# Patient Record
Sex: FEMALE | Race: WHITE | Employment: UNEMPLOYED | URBAN - METROPOLITAN AREA
[De-identification: names, ages, dates, MRNs, and addresses within clinical notes are randomized per-mention and may not be internally consistent; named-entity substitution may affect disease eponyms.]

---

## 2022-06-08 ENCOUNTER — OFFICE VISIT (OUTPATIENT)
Dept: FAMILY MEDICINE CLINIC | Age: 32
End: 2022-06-08
Payer: MEDICAID

## 2022-06-08 VITALS
HEIGHT: 61 IN | HEART RATE: 103 BPM | TEMPERATURE: 97.6 F | WEIGHT: 171 LBS | DIASTOLIC BLOOD PRESSURE: 78 MMHG | BODY MASS INDEX: 32.28 KG/M2 | SYSTOLIC BLOOD PRESSURE: 110 MMHG | OXYGEN SATURATION: 98 %

## 2022-06-08 DIAGNOSIS — M79.7 FIBROMYALGIA: ICD-10-CM

## 2022-06-08 DIAGNOSIS — F32.A DEPRESSION, UNSPECIFIED DEPRESSION TYPE: ICD-10-CM

## 2022-06-08 DIAGNOSIS — F41.9 ANXIETY: Primary | ICD-10-CM

## 2022-06-08 PROCEDURE — 99204 OFFICE O/P NEW MOD 45 MIN: CPT | Performed by: NURSE PRACTITIONER

## 2022-06-08 RX ORDER — CLONIDINE HYDROCHLORIDE 0.1 MG/1
0.1 TABLET ORAL EVERY EVENING
COMMUNITY
End: 2022-07-08 | Stop reason: ALTCHOICE

## 2022-06-08 RX ORDER — LEVONORGESTREL 52 MG/1
1 INTRAUTERINE DEVICE INTRAUTERINE ONCE
COMMUNITY

## 2022-06-08 RX ORDER — CLONIDINE HYDROCHLORIDE 0.1 MG/1
0.1 TABLET ORAL EVERY EVENING
Qty: 60 TABLET | Status: CANCELLED | OUTPATIENT
Start: 2022-06-08

## 2022-06-08 RX ORDER — GABAPENTIN 100 MG/1
100 CAPSULE ORAL 2 TIMES DAILY
Qty: 60 CAPSULE | Refills: 0 | Status: SHIPPED | OUTPATIENT
Start: 2022-06-08 | End: 2022-07-06 | Stop reason: SDUPTHER

## 2022-06-08 RX ORDER — QUETIAPINE FUMARATE 100 MG/1
100 TABLET, FILM COATED ORAL NIGHTLY
COMMUNITY
End: 2022-06-08 | Stop reason: SDUPTHER

## 2022-06-08 RX ORDER — GABAPENTIN 100 MG/1
100 CAPSULE ORAL 2 TIMES DAILY
COMMUNITY
End: 2022-06-08 | Stop reason: SDUPTHER

## 2022-06-08 RX ORDER — DIAZEPAM 5 MG/1
5 TABLET ORAL 2 TIMES DAILY
COMMUNITY
End: 2022-07-08 | Stop reason: ALTCHOICE

## 2022-06-08 RX ORDER — PROPRANOLOL HYDROCHLORIDE 20 MG/1
TABLET ORAL
Qty: 90 TABLET | Refills: 3 | Status: SHIPPED | OUTPATIENT
Start: 2022-06-08 | End: 2022-07-06 | Stop reason: SDUPTHER

## 2022-06-08 RX ORDER — QUETIAPINE FUMARATE 100 MG/1
100 TABLET, FILM COATED ORAL NIGHTLY
Qty: 30 TABLET | Refills: 0 | Status: SHIPPED | OUTPATIENT
Start: 2022-06-08 | End: 2022-07-06 | Stop reason: SDUPTHER

## 2022-06-08 RX ORDER — DIAZEPAM 5 MG/1
5 TABLET ORAL
Status: CANCELLED | OUTPATIENT
Start: 2022-06-08

## 2022-06-08 SDOH — ECONOMIC STABILITY: FOOD INSECURITY: WITHIN THE PAST 12 MONTHS, THE FOOD YOU BOUGHT JUST DIDN'T LAST AND YOU DIDN'T HAVE MONEY TO GET MORE.: NEVER TRUE

## 2022-06-08 SDOH — ECONOMIC STABILITY: FOOD INSECURITY: WITHIN THE PAST 12 MONTHS, YOU WORRIED THAT YOUR FOOD WOULD RUN OUT BEFORE YOU GOT MONEY TO BUY MORE.: NEVER TRUE

## 2022-06-08 ASSESSMENT — PATIENT HEALTH QUESTIONNAIRE - PHQ9
SUM OF ALL RESPONSES TO PHQ QUESTIONS 1-9: 0
SUM OF ALL RESPONSES TO PHQ9 QUESTIONS 1 & 2: 0
2. FEELING DOWN, DEPRESSED OR HOPELESS: 0
1. LITTLE INTEREST OR PLEASURE IN DOING THINGS: 0

## 2022-06-08 ASSESSMENT — SOCIAL DETERMINANTS OF HEALTH (SDOH): HOW HARD IS IT FOR YOU TO PAY FOR THE VERY BASICS LIKE FOOD, HOUSING, MEDICAL CARE, AND HEATING?: NOT HARD AT ALL
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
## 2022-06-17 ASSESSMENT — ENCOUNTER SYMPTOMS
COUGH: 0
ABDOMINAL PAIN: 0
VOMITING: 0
BACK PAIN: 1
NAUSEA: 0
RHINORRHEA: 0
DIARRHEA: 0
SORE THROAT: 0
WHEEZING: 0
SHORTNESS OF BREATH: 0

## 2022-07-05 DIAGNOSIS — F32.A DEPRESSION, UNSPECIFIED DEPRESSION TYPE: ICD-10-CM

## 2022-07-05 DIAGNOSIS — F41.9 ANXIETY: ICD-10-CM

## 2022-07-05 DIAGNOSIS — M79.7 FIBROMYALGIA: ICD-10-CM

## 2022-07-05 NOTE — TELEPHONE ENCOUNTER
Comments:     Last Office Visit (last PCP visit):   6/8/2022    Next Visit Date:  No future appointments. **If hasn't been seen in over a year OR hasn't followed up according to last diabetes/ADHD visit, make appointment for patient before sending refill to provider. Rx requested:  Requested Prescriptions     Pending Prescriptions Disp Refills    gabapentin (NEURONTIN) 100 MG capsule 60 capsule 0     Sig: Take 1 capsule by mouth in the morning and at bedtime for 30 days. Once in the morning once a night    QUEtiapine (SEROQUEL) 100 MG tablet 30 tablet 0     Sig: Take 1 tablet by mouth nightly    propranolol (INDERAL) 20 MG tablet 90 tablet 3     Sig: Take one tablet by mouth three times daily as needed for anxiety.

## 2022-07-06 RX ORDER — PROPRANOLOL HYDROCHLORIDE 20 MG/1
TABLET ORAL
Qty: 90 TABLET | Refills: 3 | Status: SHIPPED | OUTPATIENT
Start: 2022-07-06

## 2022-07-06 RX ORDER — QUETIAPINE FUMARATE 100 MG/1
100 TABLET, FILM COATED ORAL NIGHTLY
Qty: 30 TABLET | Refills: 0 | Status: SHIPPED | OUTPATIENT
Start: 2022-07-06 | End: 2022-08-05

## 2022-07-06 RX ORDER — GABAPENTIN 100 MG/1
100 CAPSULE ORAL 2 TIMES DAILY
Qty: 60 CAPSULE | Refills: 0 | Status: SHIPPED | OUTPATIENT
Start: 2022-07-06 | End: 2022-07-08 | Stop reason: DRUGHIGH

## 2022-07-08 ENCOUNTER — OFFICE VISIT (OUTPATIENT)
Dept: FAMILY MEDICINE CLINIC | Age: 32
End: 2022-07-08
Payer: MEDICAID

## 2022-07-08 VITALS
HEART RATE: 90 BPM | TEMPERATURE: 97.5 F | BODY MASS INDEX: 32.12 KG/M2 | SYSTOLIC BLOOD PRESSURE: 118 MMHG | OXYGEN SATURATION: 97 % | DIASTOLIC BLOOD PRESSURE: 80 MMHG | WEIGHT: 170 LBS

## 2022-07-08 DIAGNOSIS — G89.29 CHRONIC PAIN OF RIGHT KNEE: ICD-10-CM

## 2022-07-08 DIAGNOSIS — G89.29 CHRONIC BILATERAL LOW BACK PAIN WITH LEFT-SIDED SCIATICA: Primary | ICD-10-CM

## 2022-07-08 DIAGNOSIS — M79.7 FIBROMYALGIA: ICD-10-CM

## 2022-07-08 DIAGNOSIS — M54.42 CHRONIC BILATERAL LOW BACK PAIN WITH LEFT-SIDED SCIATICA: Primary | ICD-10-CM

## 2022-07-08 DIAGNOSIS — F41.9 ANXIETY: ICD-10-CM

## 2022-07-08 DIAGNOSIS — M25.561 CHRONIC PAIN OF RIGHT KNEE: ICD-10-CM

## 2022-07-08 PROCEDURE — 99214 OFFICE O/P EST MOD 30 MIN: CPT | Performed by: NURSE PRACTITIONER

## 2022-07-08 RX ORDER — TRAMADOL HYDROCHLORIDE 50 MG/1
50 TABLET ORAL 2 TIMES DAILY PRN
Qty: 14 TABLET | Refills: 0 | Status: SHIPPED | OUTPATIENT
Start: 2022-07-08 | End: 2022-07-22 | Stop reason: SDUPTHER

## 2022-07-08 RX ORDER — GABAPENTIN 100 MG/1
200 CAPSULE ORAL 2 TIMES DAILY
Qty: 120 CAPSULE | Refills: 0 | Status: SHIPPED | OUTPATIENT
Start: 2022-07-08 | End: 2022-08-05
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
## 2022-07-11 ASSESSMENT — ENCOUNTER SYMPTOMS
NAUSEA: 0
DIARRHEA: 0
WHEEZING: 0
VOMITING: 0
BACK PAIN: 1
COUGH: 0
SHORTNESS OF BREATH: 0
RHINORRHEA: 0
ABDOMINAL PAIN: 0
SORE THROAT: 0

## 2022-07-20 DIAGNOSIS — M25.561 CHRONIC PAIN OF RIGHT KNEE: ICD-10-CM

## 2022-07-20 DIAGNOSIS — G89.29 CHRONIC PAIN OF RIGHT KNEE: ICD-10-CM

## 2022-07-20 DIAGNOSIS — G89.29 CHRONIC BILATERAL LOW BACK PAIN WITH LEFT-SIDED SCIATICA: ICD-10-CM

## 2022-07-20 DIAGNOSIS — M79.7 FIBROMYALGIA: ICD-10-CM

## 2022-07-20 DIAGNOSIS — M54.42 CHRONIC BILATERAL LOW BACK PAIN WITH LEFT-SIDED SCIATICA: ICD-10-CM

## 2022-07-20 RX ORDER — TRAMADOL HYDROCHLORIDE 50 MG/1
50 TABLET ORAL 2 TIMES DAILY PRN
Qty: 14 TABLET | Refills: 0 | Status: CANCELLED | OUTPATIENT
Start: 2022-07-20 | End: 2022-07-27

## 2022-07-21 NOTE — TELEPHONE ENCOUNTER
Pt calling to check on refill for tramadol. Also wants to know if she can get a referral to a \"knee specialist\"  (orthopedic doctor?) if possible.

## 2022-07-22 DIAGNOSIS — M79.7 FIBROMYALGIA: ICD-10-CM

## 2022-07-22 DIAGNOSIS — M54.42 CHRONIC BILATERAL LOW BACK PAIN WITH LEFT-SIDED SCIATICA: ICD-10-CM

## 2022-07-22 DIAGNOSIS — G89.29 CHRONIC PAIN OF RIGHT KNEE: ICD-10-CM

## 2022-07-22 DIAGNOSIS — M25.561 CHRONIC PAIN OF RIGHT KNEE: ICD-10-CM

## 2022-07-22 DIAGNOSIS — G89.29 CHRONIC BILATERAL LOW BACK PAIN WITH LEFT-SIDED SCIATICA: ICD-10-CM

## 2022-07-22 RX ORDER — TRAMADOL HYDROCHLORIDE 50 MG/1
50 TABLET ORAL 2 TIMES DAILY PRN
Qty: 14 TABLET | Refills: 0 | Status: SHIPPED | OUTPATIENT
Start: 2022-07-22 | End: 2022-07-29

## 2022-08-03 ENCOUNTER — INITIAL CONSULT (OUTPATIENT)
Dept: PAIN MANAGEMENT | Age: 32
End: 2022-08-03
Payer: MEDICAID

## 2022-08-03 VITALS
BODY MASS INDEX: 32.1 KG/M2 | WEIGHT: 170 LBS | OXYGEN SATURATION: 95 % | TEMPERATURE: 98.6 F | HEART RATE: 70 BPM | HEIGHT: 61 IN

## 2022-08-03 DIAGNOSIS — M47.817 LUMBOSACRAL SPONDYLOSIS WITHOUT MYELOPATHY: ICD-10-CM

## 2022-08-03 DIAGNOSIS — M46.1 BILATERAL SACROILIITIS (HCC): ICD-10-CM

## 2022-08-03 DIAGNOSIS — G89.29 CHRONIC BILATERAL LOW BACK PAIN WITH LEFT-SIDED SCIATICA: Primary | ICD-10-CM

## 2022-08-03 DIAGNOSIS — M54.42 CHRONIC BILATERAL LOW BACK PAIN WITH LEFT-SIDED SCIATICA: Primary | ICD-10-CM

## 2022-08-03 PROCEDURE — 99204 OFFICE O/P NEW MOD 45 MIN: CPT | Performed by: PAIN MEDICINE

## 2022-08-03 RX ORDER — TRAMADOL HYDROCHLORIDE 50 MG/1
50 TABLET ORAL EVERY 6 HOURS PRN
COMMUNITY
End: 2022-09-01

## 2022-08-03 ASSESSMENT — ENCOUNTER SYMPTOMS
NAUSEA: 0
BACK PAIN: 1
SHORTNESS OF BREATH: 0
CONSTIPATION: 0
DIARRHEA: 0

## 2022-08-03 NOTE — PROGRESS NOTES
[facility]) Physicians  Neurosurgery and Pain [facility]  [address], [address] [facility] , [address] [facility] [place]: [phone]  F: [phone]        Shelbie Segal  (1990)    8/3/2022    Subjective:     Shelbie Segal is 28 y.o. female who complains today of:    Chief Complaint   Patient presents with    Back Pain    Neck Pain    Shoulder Pain    Knee Pain       HPI    Patient complains of pain that is chronic in multiple areas. She has a h/o of fibromyalgia, PTSD and anxiety. She reports that she had a traumatic childhood, but did not go into detail. Painful areas include: lower back with scitica down the left side predominantly (had this for years). She also has pain in shoulders and hips. Pain is located in the lower back/buttocks. It has been present for 15 years. Pain is described as a dull, constant ache. In the left leg, it is more sharp and shooting. Pain level today is rated 5 on a 10 point scale. It is severe in nature. With pain medication (Tramadol in past helped, Gabapentin), pain level drops to a 1/10. Without pain medication, pain level can escalate upto a 7/10. Pain is affecting the patients ability to perform activities of daily living especially with regards to personal hygiene, household chores and self care. With pain medication, the patient is better able to perform ADLs. Pain in part is secondary to osteoarthritis of the spine. Pain is aggravated by bending, lifting, twisting, walking, and standing  Pain is alleviated by rest and medication. Prior PT:  Yes, for three months focusing on th lower back while she was in [place]. She is currently also seeing a chiropractor. Prior Injections: none  Prior medications: NSAIDs, muscle relaxants, opiods, analgesics, and membrane stabilizers  Prior spine surgeries:  none  Pain is not is associated with fevers/chills/night sweats. Bowel and bladder are working appropriately.     She also has right knee pain. Chronic. Worse weight bearing activity, better with rest. Sharp/aching. Is going to see ortho for it. Allergies:  Patient has no known allergies. Past Medical History:   Diagnosis Date    ADHD     Anxiety     Arthritis     Depression     Fibromyalgia     Gastritis     Hx of degenerative disc disease     PTSD (post-traumatic stress disorder)      Past Surgical History:   Procedure Laterality Date    CHOLECYSTECTOMY      COLONOSCOPY  2021    TONSILLECTOMY      WISDOM TOOTH EXTRACTION       Family History   Problem Relation Age of Onset    Diabetes Mother     Stroke Mother     High Blood Pressure Father      Social History     Socioeconomic History    Marital status: Unknown     Spouse name: Not on file    Number of children: Not on file    Years of education: Not on file    Highest education level: Not on file   Occupational History    Not on file   Tobacco Use    Smoking status: Never    Smokeless tobacco: Never   Vaping Use    Vaping Use: Every day    Substances: Nicotine   Substance and Sexual Activity    Alcohol use: Yes     Comment: socially 1-2 times per week    Drug use: Never    Sexual activity: Not on file   Other Topics Concern    Not on file   Social History Narrative    Not on file     Social Determinants of Health     Financial Resource Strain: Low Risk     Difficulty of Paying Living Expenses: Not hard at all   Food Insecurity: No Food Insecurity    Worried About Running Out of Food in the Last Year: Never true    Ran Out of Food in the Last Year: Never true   Transportation Needs: Not on file   Physical Activity: Not on file   Stress: Not on file   Social Connections: Not on file   Intimate Partner Violence: Not on file   Housing Stability: Not on file       Current Outpatient Medications on File Prior to Visit   Medication Sig Dispense Refill    gabapentin (NEURONTIN) 100 MG capsule Take 2 capsules by mouth 2 times daily for 30 days.  Intended supply: 90 days 120 capsule 0 QUEtiapine (SEROQUEL) 100 MG tablet Take 1 tablet by mouth nightly 30 tablet 0    propranolol (INDERAL) 20 MG tablet Take one tablet by mouth three times daily as needed for anxiety. 90 tablet 3    levonorgestrel (LILETTA, 52 MG,) 20.1 MCG/DAY IUD IUD 52 mg 1 each by IntraUTERine route once       No current facility-administered medications on file prior to visit. Review of Systems   Constitutional:  Negative for fever. HENT:  Negative for hearing loss. Respiratory:  Negative for shortness of breath. Gastrointestinal:  Negative for constipation, diarrhea and nausea. Genitourinary:  Negative for difficulty urinating. Musculoskeletal:  Positive for arthralgias and back pain. Skin:  Negative for rash. Neurological:  Negative for headaches. Hematological:  Does not bruise/bleed easily. Psychiatric/Behavioral:  Negative for sleep disturbance. Objective:     Vitals:  Pulse 70   Temp 98.6 °F (37 °C)   Ht 5' 1\" (1.549 m)   Wt 170 lb (77.1 kg)   SpO2 95%   BMI 32.12 kg/m² Pain Score:   5      Physical Exam    General Appearance: NAD and Conversant. Eyes: EOM intact. HENT: Atraumatic. Neck: Neck is supple and Trachea midline. Lymph: No supraclavicular lymphadenopathy. Lungs: Normal respiratory effort and No significant respiratory distress. Cardiovascular: No lower extremity ulcerations or cyanosis and Capillary refill is less than 2 seconds. Abdomen: Soft and Non tender to palpation. Extremeties: No significant lower exremity edema. Skin: Visualized skin is intact and she has normal skin turgor. Psych: Mood and affect within normal limits, Insight and judgement within normal limits, and Alert and oriented. Inspection of the spine reveals no gross abnormalities in terms of curvature. ROM of the spine is abnormal.  There is pain inhibition with end ranges    Facet loading reproduces her symptoms. Palpation: she hasTTP over the lumbar paraspinal musculature.   Sacroiliac injection as ordered above. Pertinent imaging reviewed. She has failed conservative treatment (PT, Chiropractor, NSAIDs). Discussed the risks including but not limited to bleeding, infection, worsened pain, damage to surrounding structures, side effects, toxicity, allergic reactions to medications used, need for surgery, premature damage or degeneration of the joint, as well as catastrophic injury such as vision loss, paralysis, stroke, bowel or bladder incontinence, ventilator dependence, loss of use of the joint and/or extremity, and death. Discussed the risks, benefits, and alternatives to the procedure including no procedure at all. Discussed that we cannot undo any permanent neurologic or orthopaedic damage or change the course of any underlying disease. After thorough discussion, patient expressed understanding and willingness to proceed. Tramadol PRN per PCP. She asked some questions about a spinal cord stimulator, but she has not exhausted conservative measures or workup, and is not a candidate. 5. If she gets no relief, we will consider MRI of lumbar spine. Follow up:  Return for Follow Up 1 month, X rays, Joint Injection 1-2 weeks. The patient will follow up for reevaluation of her pain. The patient is aware of the treatment plan and in agreement. All of her questions were answered.      Gracia Richards MD

## 2022-08-04 ENCOUNTER — TELEPHONE (OUTPATIENT)
Dept: PAIN MANAGEMENT | Age: 32
End: 2022-08-04

## 2022-08-04 DIAGNOSIS — M79.7 FIBROMYALGIA: ICD-10-CM

## 2022-08-04 DIAGNOSIS — F32.A DEPRESSION, UNSPECIFIED DEPRESSION TYPE: ICD-10-CM

## 2022-08-04 DIAGNOSIS — M54.42 CHRONIC BILATERAL LOW BACK PAIN WITH LEFT-SIDED SCIATICA: ICD-10-CM

## 2022-08-04 DIAGNOSIS — F41.9 ANXIETY: ICD-10-CM

## 2022-08-04 DIAGNOSIS — G89.29 CHRONIC BILATERAL LOW BACK PAIN WITH LEFT-SIDED SCIATICA: ICD-10-CM

## 2022-08-04 NOTE — TELEPHONE ENCOUNTER
ORDER PLACED:    Date: 8/3/22  Description: BILAT SI JOINT INJECTION  Order Number: [id number]  Ordering Provider: [facility]  Performing Provider: [facility]  CPT Codes: 11589  ICD10 Codes: M46.1    PER [employer] PORTAL PATIENT IS TRADITIONAL MEDICAID,NO AUTH IS REQUIRED

## 2022-08-05 RX ORDER — QUETIAPINE FUMARATE 100 MG/1
100 TABLET, FILM COATED ORAL NIGHTLY
Qty: 90 TABLET | Refills: 0 | Status: SHIPPED | OUTPATIENT
Start: 2022-08-05 | End: 2022-11-03

## 2022-08-05 RX ORDER — GABAPENTIN 100 MG/1
200 CAPSULE ORAL 2 TIMES DAILY
Qty: 120 CAPSULE | Refills: 2 | Status: SHIPPED | OUTPATIENT
Start: 2022-08-05 | End: 2022-11-03

## 2022-08-05 NOTE — TELEPHONE ENCOUNTER
Comments:     Last Office Visit (last PCP visit):   7/8/2022    Next Visit Date:  Future Appointments   Date Time Provider Tay Starr   8/11/2022  2:15 PM Diana Cee MD [id number] [address]   9/6/2022  9:45 AM CHRISTIANE Elias - CNP [facility]       **If hasn't been seen in over a year OR hasn't followed up according to last diabetes/ADHD visit, make appointment for patient before sending refill to provider. Rx requested:  Requested Prescriptions     Pending Prescriptions Disp Refills    QUEtiapine (SEROQUEL) 100 MG tablet [Pharmacy Med Name: quetiapine 100 mg tablet] 30 tablet 0     Sig: Take 1 tablet by mouth nightly    gabapentin (NEURONTIN) 100 MG capsule [Pharmacy Med Name: gabapentin 100 mg capsule] 120 capsule 0     Sig: Take 2 capsules by mouth 2 times daily for 30 days.

## 2022-08-11 ENCOUNTER — OFFICE VISIT (OUTPATIENT)
Dept: ORTHOPEDIC SURGERY | Age: 32
End: 2022-08-11
Payer: MEDICAID

## 2022-08-11 VITALS
OXYGEN SATURATION: 78 % | HEIGHT: 61 IN | HEART RATE: 98 BPM | TEMPERATURE: 97 F | BODY MASS INDEX: 31.15 KG/M2 | WEIGHT: 165 LBS

## 2022-08-11 DIAGNOSIS — M17.11 PRIMARY OSTEOARTHRITIS OF RIGHT KNEE: Primary | ICD-10-CM

## 2022-08-11 DIAGNOSIS — M22.8X1 MALTRACKING OF RIGHT PATELLA: ICD-10-CM

## 2022-08-11 PROCEDURE — 99244 OFF/OP CNSLTJ NEW/EST MOD 40: CPT | Performed by: ORTHOPAEDIC SURGERY

## 2022-08-11 PROCEDURE — 20610 DRAIN/INJ JOINT/BURSA W/O US: CPT | Performed by: ORTHOPAEDIC SURGERY

## 2022-08-11 RX ORDER — TRIAMCINOLONE ACETONIDE 40 MG/ML
80 INJECTION, SUSPENSION INTRA-ARTICULAR; INTRAMUSCULAR ONCE
Status: COMPLETED | OUTPATIENT
Start: 2022-08-11 | End: 2022-08-11

## 2022-08-11 RX ORDER — LIDOCAINE HYDROCHLORIDE 10 MG/ML
8 INJECTION, SOLUTION INFILTRATION; PERINEURAL ONCE
Status: COMPLETED | OUTPATIENT
Start: 2022-08-11 | End: 2022-08-11

## 2022-08-11 RX ADMIN — TRIAMCINOLONE ACETONIDE 80 MG: 40 INJECTION, SUSPENSION INTRA-ARTICULAR; INTRAMUSCULAR at 15:13

## 2022-08-11 RX ADMIN — LIDOCAINE HYDROCHLORIDE 8 ML: 10 INJECTION, SOLUTION INFILTRATION; PERINEURAL at 15:12

## 2022-08-11 ASSESSMENT — ENCOUNTER SYMPTOMS
SHORTNESS OF BREATH: 0
COUGH: 0
ABDOMINAL PAIN: 0
EYE PAIN: 0
CONSTIPATION: 0
EYE DISCHARGE: 0
EYE ITCHING: 0
DIARRHEA: 0

## 2022-08-11 NOTE — PROGRESS NOTES
Patient's name, date of birth, and allergies have been confirmed. Patient is aware that injection is to be given in Right knee. He/she is aware that they will be seeing Dr. Nimesh Tavera and the injection will be given by him.

## 2022-08-11 NOTE — PROGRESS NOTES
This is a consult from CHRISTIANE Joyner CNP for evaluation of the patient's right knee pain. Patient ID:  Thelma Esparza is a 28 y.o. female who presents today for evaluation of right knee pain. Injury: no -the patient recently relocated from [place] and did a lot of moving. She has been having pain in the knee ever since  Metal Allergy: no    Location: right  knee pain, located on the medial and anterior aspect of the knee  Pain: yes; 7 on a scale of 1 to 10  Onset: sudden  Duration: 2 months  Frequency:  occurs daily  Quality: aching and boring   Swelling: patient notes intermittent swelling of the joint  Aggravating factors: weight bearing activity, standing, and walking  Alleviating factors: removing weight from leg and rest  Mechanical symptoms: none  Radiation: no    Activities: walking independently  Restriction:  decreased ambulatory tolerance  Progression:  worsening    Previous treatment:  none  NSAIDs:  none  PT:  none    Medications:    Current Outpatient Medications on File Prior to Visit   Medication Sig Dispense Refill    QUEtiapine (SEROQUEL) 100 MG tablet Take 1 tablet by mouth nightly 90 tablet 0    gabapentin (NEURONTIN) 100 MG capsule Take 2 capsules by mouth in the morning and 2 capsules before bedtime. Do all this for 90 days. 120 capsule 2    propranolol (INDERAL) 20 MG tablet Take one tablet by mouth three times daily as needed for anxiety. 90 tablet 3    levonorgestrel (LILETTA, 52 MG,) 20.1 MCG/DAY IUD IUD 52 mg 1 each by IntraUTERine route once      traMADol (ULTRAM) 50 MG tablet Take 50 mg by mouth every 6 hours as needed for Pain. (Patient not taking: Reported on 8/11/2022)       No current facility-administered medications on file prior to visit.        Allergies:    No Known Allergies    Past Medical History:    Past Medical History:   Diagnosis Date    ADHD     Anxiety     Arthritis     Arthritis     Chronic back pain     Chronic headaches     Depression     Fibromyalgia Gastritis     Hx of degenerative disc disease     Irritable bowel syndrome     PTSD (post-traumatic stress disorder)        Past Surgical History:    Past Surgical History:   Procedure Laterality Date    CHOLECYSTECTOMY      COLONOSCOPY  2021    TONSILLECTOMY      WISDOM TOOTH EXTRACTION         Social History:    Social History     Socioeconomic History    Marital status: Unknown     Spouse name: Not on file    Number of children: Not on file    Years of education: Not on file    Highest education level: Not on file   Occupational History    Not on file   Tobacco Use    Smoking status: Never    Smokeless tobacco: Never   Vaping Use    Vaping Use: Every day    Substances: Nicotine   Substance and Sexual Activity    Alcohol use: Yes     Comment: socially 1-2 times per week    Drug use: Never    Sexual activity: Not on file   Other Topics Concern    Not on file   Social History Narrative    Not on file     Social Determinants of Health     Financial Resource Strain: Low Risk     Difficulty of Paying Living Expenses: Not hard at all   Food Insecurity: No Food Insecurity    Worried About Running Out of Food in the Last Year: Never true    Ran Out of Food in the Last Year: Never true   Transportation Needs: Not on file   Physical Activity: Not on file   Stress: Not on file   Social Connections: Not on file   Intimate Partner Violence: Not on file   Housing Stability: Not on file       Family History:    Family History   Problem Relation Age of Onset    High Blood Pressure Mother     Arthritis Mother     Diabetes Mother     Stroke Mother     Arthritis Father     High Blood Pressure Father        Occupation:   Presently not working   - Occupational requirements:  none    Workers Compensation:  Have you missed work for this issue?  no  Is this issue being addressed under a worker's comp claim? no    Review of Systems:    Review of Systems   Constitutional:  Negative for activity change, appetite change and chills. HENT:  Negative for congestion, ear pain and hearing loss. Eyes:  Negative for pain, discharge and itching. Respiratory:  Negative for cough and shortness of breath. Cardiovascular:  Negative for chest pain and leg swelling. Gastrointestinal:  Negative for abdominal pain, constipation and diarrhea. Endocrine: Negative for cold intolerance, heat intolerance and polydipsia. Genitourinary:  Negative for difficulty urinating, flank pain and frequency. Skin:  Negative for rash and wound. Allergic/Immunologic: Negative for environmental allergies and food allergies. Neurological:  Negative for dizziness, seizures and syncope. Physical Exam:    Examination of the right knee    Gait:  antalgic gait affecting right  Inspection:  neutral  Swelling:  none  Erythema:  none  Ecchymosis:  none  Effusion:  1+  Palpation:  distal medial femoral condyle and lateral patella  Extension:  5  Flexion:  110  Strength:  5  Varus/Valgus Instability:  none  Anterior/Posterior Instability:  none  Steven:  negative  Thessaly:  negative  Modified Apley:  negative  Lachman:  negative  Patellar compression:  positive  Neurological/Vascular:  Sensation grossly intact. Dorsalis pedis palpable and 2+    Radiographs:  Radiographs of the right knee were personally reviewed, bilateral standing AP, bilateral notch, bilateral sunrise and lateral right knee and they revealed:  no evidence of fracture, mild medial knee oa, and the patient has lateral patellar tilt and is maintaining a lateral joint space. This is all on the right knee. There are no destructive bony lesions noted. There are no intra-articular loose bodies noted. The patient's maintaining a joint space in all 3 compartments of the left knee. She does, however, have lateral patellar tilt on the left side. No destructive bony lesions noted on the left. There are no intra-articular loose bodies noted. MRI: None    Diagnosis:   Diagnosis Orders   1. Primary osteoarthritis of right knee  NV ARTHROCENTESIS ASPIR&/INJ MAJOR JT/BURSA W/O US      2. Maltracking of right patella  NV ARTHROCENTESIS ASPIR&/INJ MAJOR JT/BURSA W/O US    Ambulatory referral to Physical Therapy          Plan:    The patient has some very mild degenerative changes of the right knee. That being said, it became symptomatic while she was moving. We discussed arthritis. We discussed its progressive nature. Treatment options were discussed. Patient declined taking any medications. For this reason, we discussed an intra-articular steroid injection. The patient opted to proceed with this. She will follow-up as needed. Time Out  [x] Patient Verified  [x] Site Verified  [x] Laterality Verified  [x] Procedure Verified    Before aspiration/injection risks/benefits of a cortisone injection including infection, local skin irritation, skin atrophy, continued pain/discomfort, elevated blood sugar, burning, failure to relieve pain, possible late infection were discussed with patient. Patient verbalized understanding and wanted to proceed with planned procedure. After prepping and draping the right knee in the usual sterile fashion, 2 cc of 40 mg/ml kenalog with 8 cc of 1% lidocaine were injected intra-articularly without any complications. Patient tolerated this well. Post-procedure discomfort can be alleviated with additional medications/ice/elevation/rest over the first 24 hours as recommended. The patient tolerated the procedure well.     If fluid was aspirated it was sent for further studies  in sterile specimen container as ordered to the lab     Orders Placed This Encounter   Procedures    Ambulatory referral to Physical Therapy     Referral Priority:   Routine     Referral Type:   Eval and Treat     Referral Reason:   Specialty Services Required     Requested Specialty:   Physical Therapist     Number of Visits Requested:   1    NV ARTHROCENTESIS ASPIR&/INJ MAJOR JT/BURSA W/O US       Orders Placed This Encounter   Medications    lidocaine 1 % injection 8 mL    triamcinolone acetonide (KENALOG-40) injection 80 mg       Return if symptoms worsen or fail to improve.     Louie Wasserman MD

## 2022-08-31 DIAGNOSIS — M16.12 PRIMARY OSTEOARTHRITIS OF LEFT HIP: ICD-10-CM

## 2022-08-31 DIAGNOSIS — M16.11 PRIMARY OSTEOARTHRITIS OF RIGHT HIP: Primary | ICD-10-CM

## 2022-09-01 ENCOUNTER — OFFICE VISIT (OUTPATIENT)
Dept: ORTHOPEDIC SURGERY | Age: 32
End: 2022-09-01
Payer: MEDICAID

## 2022-09-01 VITALS
HEIGHT: 61 IN | OXYGEN SATURATION: 98 % | BODY MASS INDEX: 31.15 KG/M2 | TEMPERATURE: 98.1 F | HEART RATE: 91 BPM | WEIGHT: 165 LBS

## 2022-09-01 DIAGNOSIS — S83.241D TEAR OF MEDIAL MENISCUS OF RIGHT KNEE, CURRENT, UNSPECIFIED TEAR TYPE, SUBSEQUENT ENCOUNTER: ICD-10-CM

## 2022-09-01 DIAGNOSIS — G89.29 CHRONIC SI JOINT PAIN: ICD-10-CM

## 2022-09-01 DIAGNOSIS — M53.3 CHRONIC SI JOINT PAIN: ICD-10-CM

## 2022-09-01 DIAGNOSIS — M54.16 LUMBAR RADICULOPATHY: Primary | ICD-10-CM

## 2022-09-01 PROCEDURE — 99214 OFFICE O/P EST MOD 30 MIN: CPT | Performed by: ORTHOPAEDIC SURGERY

## 2022-09-01 RX ORDER — METHYLPREDNISOLONE 4 MG/1
TABLET ORAL
Qty: 21 TABLET | Refills: 0 | Status: SHIPPED | OUTPATIENT
Start: 2022-09-01 | End: 2022-09-28

## 2022-09-01 NOTE — PROGRESS NOTES
Patient ID:  Franco Friend is a 28 y.o. female who presents today for evaluation of bilateral hip pain, worse on left. Lower back, buttock, occasionally down to left ankle posteriorly. Patient reports that she is done physical therapy and failed to get any improvement. The patient reports that she also continues to have right knee pain. It is not activity related aching and throbbing, but it is sharp stabbing pains with instability. The pain is mostly on the medial side of the knee. Suspicion is for a tear of the medial meniscus.     Injury: no    Knee  Location of pain:  right  medial knee  Pain: yes; 7 on a scale of 1 to 10  Onset: sudden  Duration: 3 months  Frequency:  occurs intermittently and occurs daily  Quality: sharp   Swelling: patient notes intermittent swelling of the joint  Aggravating factors: weight bearing activity, walking, twisting, and deep knee flexion  Alleviating factors: removing weight from leg and rest  Mechanical symptoms: instability and locking  Radiation: no    Hip  Location of pain: Lower back and buttock bilaterally; occasionally radiating down the left posterior thigh to the ankle  Pain: yes; 7 on a scale of 1 to 10  Onset: gradual  Duration: 2 months  Frequency:  occurs daily  Quality: aching and burning   Swelling: None  Aggravating factors: weight bearing activity, standing, and walking  Alleviating factors: removing weight from leg and rest  Mechanical symptoms: none  Radiation: yes down the posterior thigh on the left    Activities: walking independently  Restriction:  decreased ambulatory tolerance  Progression:  worsening    Previous treatment:   Steroid injection for the knee, and physical therapy for the back  NSAIDs:  ibuprofen/motrin  PT:   PT from December to March for back    Medications:    Current Outpatient Medications on File Prior to Visit   Medication Sig Dispense Refill    QUEtiapine (SEROQUEL) 100 MG tablet Take 1 tablet by mouth nightly 90 tablet 0 gabapentin (NEURONTIN) 100 MG capsule Take 2 capsules by mouth in the morning and 2 capsules before bedtime. Do all this for 90 days. 120 capsule 2    levonorgestrel (LILETTA, 52 MG,) 20.1 MCG/DAY IUD IUD 52 mg 1 each by IntraUTERine route once      propranolol (INDERAL) 20 MG tablet Take one tablet by mouth three times daily as needed for anxiety. (Patient not taking: Reported on 9/1/2022) 90 tablet 3     No current facility-administered medications on file prior to visit. Allergies:    No Known Allergies    Physical Exam:    Examination of the bilateral hip    Gait:  antalgic gait affecting right because of the right knee  Trendelenberg sign:  negative  Swelling:  none  Erythema:  none  Ecchymosis:  none  Extension:  slight flexion contracture  Flexion:  110  Internal rotation: 15 degrees  External rotation:  35 degrees  Abduction:  40 degrees  Adduction:  10 degrees  Strength:  abduction 5 and flexion 5  Palpation:  No tenderness  Log roll:  non-painful  Straight leg raise:   Positive on the left  Neurovascular status:  sensation intact, moves foot and ankle up and down, moves toes up and down, and 2+ dorsalis pedis    Examination of the right knee  Patient has pain on palpation over the medial joint line. There is no varus or valgus laxity of either knee. She is able to extend the right knee to with about 5 degrees of full extension and flex the back to 110 degrees. No anterior posterior laxity. No pain on the distal medial or distal lateral femoral condyles. She has pain on palpation over the medial joint line and the lateral patella. Thessaly test is positive. Radiographs:  None      MRI: None    Diagnosis:     Diagnosis Orders   1. Lumbar radiculopathy  MRI LUMBAR SPINE WO CONTRAST      2. Chronic SI joint pain  MRI LUMBAR SPINE WO CONTRAST      3. Tear of medial meniscus of right knee, current, unspecified tear type, subsequent encounter  MRI KNEE RIGHT WO CONTRAST          Plan:     The patient has 2 issues. I suspect that she has a tear of the medial meniscus of the right knee. She had pain since moving, and steroid injection failed to provide her with much relief. She has mechanical symptoms. Suspicion is for a tear of the medial meniscus. We are going to go ahead and put in for an MRI of the right knee. We will follow-up to discuss the MRI findings. Additionally, patient has a lumbar radiculopathy which is failed to respond to 3 months of physical therapy and activity modification. She has symptoms going all the way down the posterior aspect of the left leg. We are going to put in for an MRI of this as well. She has failed therapy. Going to put her on a Medrol Dosepak to get her some relief. We will follow-up to discuss the MRI findings. Orders Placed This Encounter   Procedures    MRI LUMBAR SPINE WO CONTRAST     Standing Status:   Future     Standing Expiration Date:   9/1/2023     Order Specific Question:   Reason for exam:     Answer:   sciatica left side, therapy failed to alleviate     Order Specific Question:   What is the sedation requirement? Answer:   None    MRI KNEE RIGHT WO CONTRAST     Standing Status:   Future     Standing Expiration Date:   9/1/2023     Scheduling Instructions:      MRI of right knee to assess for meniscal tear, loose osteochondral body, stress fracture     Order Specific Question:   Reason for exam:     Answer:   knee pain       Orders Placed This Encounter   Medications    methylPREDNISolone (MEDROL DOSEPACK) 4 MG tablet     Sig: On days 1-6 take as directed on package for first 6-day course. Dispense:  21 tablet     Refill:  0       No follow-ups on file.     Delphine Cast MD

## 2022-09-22 ENCOUNTER — HOSPITAL ENCOUNTER (OUTPATIENT)
Dept: MRI IMAGING | Age: 32
Discharge: HOME | End: 2022-09-24
Payer: MEDICAID

## 2022-09-22 DIAGNOSIS — G89.29 CHRONIC SI JOINT PAIN: ICD-10-CM

## 2022-09-22 DIAGNOSIS — M53.3 CHRONIC SI JOINT PAIN: ICD-10-CM

## 2022-09-22 DIAGNOSIS — S83.241D TEAR OF MEDIAL MENISCUS OF RIGHT KNEE, CURRENT, UNSPECIFIED TEAR TYPE, SUBSEQUENT ENCOUNTER: ICD-10-CM

## 2022-09-22 DIAGNOSIS — M54.16 LUMBAR RADICULOPATHY: ICD-10-CM

## 2022-09-22 PROCEDURE — 72148 MRI LUMBAR SPINE W/O DYE: CPT

## 2022-09-22 PROCEDURE — 73721 MRI JNT OF LWR EXTRE W/O DYE: CPT

## 2022-09-28 ENCOUNTER — OFFICE VISIT (OUTPATIENT)
Dept: FAMILY MEDICINE CLINIC | Age: 32
End: 2022-09-28
Payer: MEDICAID

## 2022-09-28 VITALS
WEIGHT: 164 LBS | DIASTOLIC BLOOD PRESSURE: 74 MMHG | OXYGEN SATURATION: 98 % | HEIGHT: 61 IN | HEART RATE: 98 BPM | BODY MASS INDEX: 30.96 KG/M2 | SYSTOLIC BLOOD PRESSURE: 106 MMHG | TEMPERATURE: 98.6 F

## 2022-09-28 DIAGNOSIS — Z11.52 ENCOUNTER FOR SCREENING FOR COVID-19: ICD-10-CM

## 2022-09-28 DIAGNOSIS — G89.29 CHRONIC BILATERAL LOW BACK PAIN WITH LEFT-SIDED SCIATICA: Primary | ICD-10-CM

## 2022-09-28 DIAGNOSIS — J06.9 UPPER RESPIRATORY TRACT INFECTION, UNSPECIFIED TYPE: ICD-10-CM

## 2022-09-28 DIAGNOSIS — M54.42 CHRONIC BILATERAL LOW BACK PAIN WITH LEFT-SIDED SCIATICA: Primary | ICD-10-CM

## 2022-09-28 LAB
INFLUENZA A ANTIBODY: NEGATIVE
INFLUENZA B ANTIBODY: NEGATIVE
Lab: NORMAL
PERFORMING INSTRUMENT: NORMAL
QC PASS/FAIL: NORMAL
SARS-COV-2, POC: NORMAL

## 2022-09-28 PROCEDURE — 99214 OFFICE O/P EST MOD 30 MIN: CPT | Performed by: NURSE PRACTITIONER

## 2022-09-28 PROCEDURE — 87426 SARSCOV CORONAVIRUS AG IA: CPT | Performed by: NURSE PRACTITIONER

## 2022-09-28 PROCEDURE — 87804 INFLUENZA ASSAY W/OPTIC: CPT | Performed by: NURSE PRACTITIONER

## 2022-09-28 RX ORDER — CYCLOBENZAPRINE HCL 10 MG
10 TABLET ORAL 3 TIMES DAILY PRN
Qty: 30 TABLET | Refills: 0 | Status: SHIPPED | OUTPATIENT
Start: 2022-09-28 | End: 2022-10-08

## 2022-09-28 ASSESSMENT — ENCOUNTER SYMPTOMS
COUGH: 1
NAUSEA: 0
SINUS PRESSURE: 1
SHORTNESS OF BREATH: 0
VOMITING: 0
ABDOMINAL PAIN: 0
SINUS PAIN: 1
RHINORRHEA: 1
WHEEZING: 1
SORE THROAT: 1
CHEST TIGHTNESS: 1
DIARRHEA: 1

## 2022-09-28 NOTE — PROGRESS NOTES
[address] [facility] [address] [facility]  [address] [facility] [address]  Dept: [phone]  Dept Fax: [phone]  Loc: [phone]     Subjective     Michael Scott Bar 28 y.o. female presents 9/28/22 with   Chief Complaint   Patient presents with    URI     SX started Monday evening. Congestion and Nasal drip started yesterday. Overnight developed a cough, having some loose stool. Feels like she lost her sense of taste. URI   This is a new problem. The current episode started in the past 7 days. There has been no fever. Associated symptoms include congestion, coughing (mild), diarrhea, rhinorrhea, sinus pain, a sore throat (mild) and wheezing. Pertinent negatives include no abdominal pain, chest pain, ear pain, nausea or vomiting. She has tried decongestant and NSAIDs for the symptoms. The treatment provided mild relief. Not tasting much. Patient was sent home from work. Also mentions that she hurt her back two weeks ago, having muscle spasms in lower back area.      Reviewed the following history:    Past Medical History:   Diagnosis Date    ADHD     Anxiety     Arthritis     Arthritis     Chronic back pain     Chronic headaches     Depression     Fibromyalgia     Gastritis     Hx of degenerative disc disease     Irritable bowel syndrome     PTSD (post-traumatic stress disorder)      Past Surgical History:   Procedure Laterality Date    CHOLECYSTECTOMY      COLONOSCOPY  2021    TONSILLECTOMY      WISDOM TOOTH EXTRACTION       Family History   Problem Relation Age of Onset    High Blood Pressure Mother     Arthritis Mother     Diabetes Mother     Stroke Mother     Arthritis Father     High Blood Pressure Father        No Known Allergies    Current Outpatient Medications on File Prior to Visit   Medication Sig Dispense Refill    QUEtiapine (SEROQUEL) 100 MG tablet Take 1 tablet by mouth nightly 90 tablet 0    gabapentin (NEURONTIN) Musculoskeletal:      Cervical back: Normal range of motion and neck supple. Lumbar back: Tenderness present. Skin:     General: Skin is warm and dry. Neurological:      General: No focal deficit present. Mental Status: She is alert and oriented to person, place, and time. Psychiatric:         Attention and Perception: Attention normal.         Mood and Affect: Mood normal.         Speech: Speech normal.         Behavior: Behavior normal.         Thought Content: Thought content normal.         Cognition and Memory: Cognition normal.         Judgment: Judgment normal.     POC Testing Today:   Results for POC orders placed in visit on 09/28/22   POCT Influenza A/B   Result Value Ref Range    Influenza A Ab negative     Influenza B Ab negative        Assessment and Plan    Mikayla Chew 28 y.o. female presenting for back pain, URI     1. Chronic bilateral low back pain with left-sided sciatica  - cyclobenzaprine (FLEXERIL) 10 MG tablet; Take 1 tablet by mouth 3 times daily as needed for Muscle spasms  Dispense: 30 tablet; Refill: 0    2. Upper respiratory tract infection, unspecified type    - POCT COVID-19, Antigen  - POCT Influenza A/B    3. Encounter for screening for COVID-19  - POCT COVID-19, Antigen      Procedures:  Unless otherwise noted below, none    Return if symptoms worsen or fail to improve, for follow up. Side effects and adverse effects of any medication prescribed today, as well as treatment plan/rationale, follow-up care, and result expectations have been discussed with the patient. Expresses understanding and desires to proceed with treatment plan. The patient was reminded that if an antibiotic has been prescribed the predicted course is improvement to cure with no persistent issues. Take antibiotics as directed. If any problems occur, an appointment should be made or ER visit if severe. Because of the risk with ANY antibiotic of C.  Difficile colitis if persistent

## 2022-09-30 ASSESSMENT — ENCOUNTER SYMPTOMS: BACK PAIN: 1

## 2022-11-11 DIAGNOSIS — G89.29 CHRONIC BILATERAL LOW BACK PAIN WITH LEFT-SIDED SCIATICA: ICD-10-CM

## 2022-11-11 DIAGNOSIS — F41.9 ANXIETY: ICD-10-CM

## 2022-11-11 DIAGNOSIS — M79.7 FIBROMYALGIA: ICD-10-CM

## 2022-11-11 DIAGNOSIS — F32.A DEPRESSION, UNSPECIFIED DEPRESSION TYPE: ICD-10-CM

## 2022-11-11 DIAGNOSIS — M54.42 CHRONIC BILATERAL LOW BACK PAIN WITH LEFT-SIDED SCIATICA: ICD-10-CM

## 2022-11-14 RX ORDER — QUETIAPINE FUMARATE 100 MG/1
100 TABLET, FILM COATED ORAL NIGHTLY
Qty: 90 TABLET | Refills: 0 | Status: SHIPPED | OUTPATIENT
Start: 2022-11-14 | End: 2023-02-12

## 2022-11-14 RX ORDER — GABAPENTIN 100 MG/1
CAPSULE ORAL
Qty: 180 CAPSULE | Refills: 0 | Status: SHIPPED | OUTPATIENT
Start: 2022-11-14 | End: 2022-12-14

## 2022-11-14 NOTE — TELEPHONE ENCOUNTER
Comments:     Last Office Visit (last PCP visit):   9/28/2022    Next Visit Date:  No future appointments. **If hasn't been seen in over a year OR hasn't followed up according to last diabetes/ADHD visit, make appointment for patient before sending refill to provider. Rx requested:  Requested Prescriptions     Pending Prescriptions Disp Refills    QUEtiapine (SEROQUEL) 100 MG tablet [Pharmacy Med Name: quetiapine 100 mg tablet] 90 tablet 0     Sig: Take 1 tablet by mouth nightly    gabapentin (NEURONTIN) 100 MG capsule [Pharmacy Med Name: gabapentin 100 mg capsule] 360 capsule 0     Sig: Take 2 capsules by mouth in the morning and 2 capsules before bedtime.

## 2022-12-06 DIAGNOSIS — M79.7 FIBROMYALGIA: ICD-10-CM

## 2022-12-06 DIAGNOSIS — M54.42 CHRONIC BILATERAL LOW BACK PAIN WITH LEFT-SIDED SCIATICA: ICD-10-CM

## 2022-12-06 DIAGNOSIS — G89.29 CHRONIC BILATERAL LOW BACK PAIN WITH LEFT-SIDED SCIATICA: ICD-10-CM

## 2022-12-06 NOTE — TELEPHONE ENCOUNTER
Comments:     Last Office Visit (last PCP visit):   9/28/2022    Next Visit Date:  Future Appointments   Date Time Provider Tay Starr   12/13/2022  9:45 AM Emily Shaver MD [facility]       **If hasn't been seen in over a year OR hasn't followed up according to last diabetes/ADHD visit, make appointment for patient before sending refill to provider. Rx requested:  Requested Prescriptions     Pending Prescriptions Disp Refills    gabapentin (NEURONTIN) 100 MG capsule 180 capsule 0     Sig: Take 2 capsules by mouth in the morning and 2 capsules before bedtime.

## 2022-12-06 NOTE — TELEPHONE ENCOUNTER
----- Message from Pamela Vanessa sent at 12/6/2022  1:16 PM EST -----  Subject: Refill Request    QUESTIONS  Name of Medication? gabapentin (NEURONTIN) 100 MG capsule  Patient-reported dosage and instructions? two tablets in the morning and 2   tablets at night  How many days do you have left? 10  Preferred Pharmacy? [facility] phone number (if available)? [phone]  ---------------------------------------------------------------------------  --------------  Yess[facility]  What is the best way for the office to contact you? OK to leave message on   voicemail  Preferred Call Back Phone Number? [phone]  ---------------------------------------------------------------------------  --------------  SCRIPT ANSWERS  Relationship to Patient?  Self

## 2022-12-07 RX ORDER — GABAPENTIN 100 MG/1
CAPSULE ORAL
Qty: 180 CAPSULE | Refills: 0 | Status: SHIPPED | OUTPATIENT
Start: 2022-12-07 | End: 2023-01-06

## 2022-12-12 ENCOUNTER — OFFICE VISIT (OUTPATIENT)
Dept: INTERNAL MEDICINE | Age: 32
End: 2022-12-12
Payer: MEDICAID

## 2022-12-12 VITALS
HEART RATE: 68 BPM | WEIGHT: 163.8 LBS | TEMPERATURE: 97.2 F | DIASTOLIC BLOOD PRESSURE: 68 MMHG | OXYGEN SATURATION: 99 % | BODY MASS INDEX: 30.95 KG/M2 | SYSTOLIC BLOOD PRESSURE: 112 MMHG

## 2022-12-12 DIAGNOSIS — B34.9 VIRAL ILLNESS: Primary | ICD-10-CM

## 2022-12-12 LAB
INFLUENZA A ANTIBODY: NEGATIVE
INFLUENZA B ANTIBODY: NEGATIVE
Lab: NORMAL
PERFORMING INSTRUMENT: NORMAL
QC PASS/FAIL: NORMAL
S PYO AG THROAT QL: NORMAL
SARS-COV-2, POC: NORMAL

## 2022-12-12 PROCEDURE — 87804 INFLUENZA ASSAY W/OPTIC: CPT | Performed by: NURSE PRACTITIONER

## 2022-12-12 PROCEDURE — 99213 OFFICE O/P EST LOW 20 MIN: CPT | Performed by: NURSE PRACTITIONER

## 2022-12-12 PROCEDURE — 87880 STREP A ASSAY W/OPTIC: CPT | Performed by: NURSE PRACTITIONER

## 2022-12-12 PROCEDURE — 87426 SARSCOV CORONAVIRUS AG IA: CPT | Performed by: NURSE PRACTITIONER

## 2022-12-12 NOTE — PROGRESS NOTES
[address] PRIMARY CARE  [address] [address]  Dept: [phone]  Dept Fax: [phone]  Loc: [phone]     Lyubov Adam 33 y.o. female presents 12/12/22 with   Chief Complaint   Patient presents with    Emesis     X 4 days, body aches, HA, congestion, diarrhea, stomach cramping and cough        URI   This is a new problem. The problem has been unchanged. There has been no fever. Associated symptoms include congestion, diarrhea, headaches and vomiting. Pertinent negatives include no abdominal pain, chest pain, coughing, nausea, rash, rhinorrhea, sore throat or wheezing. She has tried NSAIDs for the symptoms. The treatment provided mild relief. Symptoms have been improving. Reviewed the following history:    Past Medical History:   Diagnosis Date    ADHD     Anxiety     Arthritis     Arthritis     Chronic back pain     Chronic headaches     Depression     Fibromyalgia     Gastritis     Hx of degenerative disc disease     Irritable bowel syndrome     PTSD (post-traumatic stress disorder)      Past Surgical History:   Procedure Laterality Date    CHOLECYSTECTOMY      COLONOSCOPY  2021    TONSILLECTOMY      WISDOM TOOTH EXTRACTION       Family History   Problem Relation Age of Onset    High Blood Pressure Mother     Arthritis Mother     Diabetes Mother     Stroke Mother     Arthritis Father     High Blood Pressure Father        No Known Allergies    Current Outpatient Medications on File Prior to Visit   Medication Sig Dispense Refill    gabapentin (NEURONTIN) 100 MG capsule Take 2 capsules by mouth in the morning and 2 capsules before bedtime. 180 capsule 0    QUEtiapine (SEROQUEL) 100 MG tablet Take 1 tablet by mouth nightly 90 tablet 0    propranolol (INDERAL) 20 MG tablet Take one tablet by mouth three times daily as needed for anxiety.  (Patient taking differently: PRN (has been taking this a lot less)) 90 tablet 3    levonorgestrel (LILETTA, 52 MG,) 20.1 MCG/DAY IUD IUD 52 mg 1 each by IntraUTERine route once       No current facility-administered medications on file prior to visit. Review of Systems   Constitutional:  Negative for chills and fever. HENT:  Positive for congestion. Negative for rhinorrhea and sore throat. Respiratory:  Negative for cough, shortness of breath and wheezing. Cardiovascular:  Negative for chest pain. Gastrointestinal:  Positive for diarrhea and vomiting. Negative for abdominal pain and nausea. Musculoskeletal:  Negative for back pain and myalgias. Skin:  Negative for rash. Neurological:  Positive for headaches. Objective    Vitals:    12/12/22 1148   BP: 112/68   Site: Right Upper Arm   Position: Sitting   Cuff Size: Medium Adult   Pulse: 68   Temp: 97.2 °F (36.2 °C)   TempSrc: Infrared   SpO2: 99%   Weight: 163 lb 12.8 oz (74.3 kg)       Physical Exam  Constitutional:       General: She is not in acute distress. Appearance: Normal appearance. She is not ill-appearing or toxic-appearing. HENT:      Head: Normocephalic and atraumatic. Right Ear: Hearing and external ear normal.      Left Ear: Hearing and external ear normal.   Eyes:      General: Lids are normal.      Conjunctiva/sclera: Conjunctivae normal.   Cardiovascular:      Rate and Rhythm: Normal rate and regular rhythm. Heart sounds: Normal heart sounds. Pulmonary:      Effort: Pulmonary effort is normal. No respiratory distress. Breath sounds: Normal breath sounds. No decreased breath sounds, wheezing, rhonchi or rales. Musculoskeletal:      Cervical back: Normal range of motion and neck supple. Skin:     General: Skin is warm and dry. Neurological:      General: No focal deficit present. Mental Status: She is alert and oriented to person, place, and time.    Psychiatric:         Attention and Perception: Attention normal.         Mood and Affect: Mood normal.         Speech: Speech normal.         Behavior: Behavior normal.         Thought Content: Thought content normal.         Cognition and Memory: Cognition normal.         Judgment: Judgment normal.     POC Testing Today:   Results for POC orders placed in visit on 12/12/22   POCT Influenza A/B   Result Value Ref Range    Influenza A Ab negative     Influenza B Ab negative    POCT rapid strep A   Result Value Ref Range    Strep A Ag None Detected None Detected       Assessment and Plan    Siddharth Sawyer 33 y.o. female presenting for viral illness     1. Viral illness  - POCT COVID-19, Antigen  - POCT Influenza A/B  - POCT rapid strep A      Procedures:  Unless otherwise noted below, none    Return if symptoms worsen or fail to improve, for follow up. Side effects and adverse effects of any medication prescribed today, as well as treatment plan/rationale, follow-up care, and result expectations have been discussed with the patient. Expresses understanding and desires to proceed with treatment plan. The patient was reminded that if an antibiotic has been prescribed the predicted course is improvement to cure with no persistent issues. Take antibiotics as directed. If any problems occur, an appointment should be made or ER visit if severe. Because of the risk with ANY antibiotic of C. Difficile colitis if persistent diarrhea or abdominal pain or any concerning symptoms, we should be notified. To reduce this risk, a probiotic pill, yogurt or other preparations containing active cultures should be ingested daily -particularly while on the antibiotic. If any persistent symptoms of illness, follow up appointment should be made in a timely fashion with a physician. Discussed signs and symptoms which require immediate follow-up in ED/call to 911. Understanding verbalized. I have reviewed and updated the electronic medical record.     CHRISTIANE Grimaldo - CNP

## 2022-12-12 NOTE — LETTER
[facility] Primary Care  [facility] [place] [address]  Phone: [phone]  Fax: [phone]    CHRISTIANE Gutierrez CNP        December 12, 2022     Patient: Deidre Clemente   YOB: 1990   Date of Visit: 12/12/2022       To Whom it May Concern:    Deidre Clemente was seen in my clinic on 12/12/2022. She may return to work on Wednesday 12/14/22. If you have any questions or concerns, please don't hesitate to call.     Sincerely,         CHRISTIANE Gutierrez CNP

## 2022-12-20 ENCOUNTER — OFFICE VISIT (OUTPATIENT)
Dept: FAMILY MEDICINE CLINIC | Age: 32
End: 2022-12-20
Payer: MEDICAID

## 2022-12-20 ENCOUNTER — HOSPITAL ENCOUNTER (OUTPATIENT)
Age: 32
Setting detail: SPECIMEN
Discharge: HOME | End: 2022-12-20
Payer: MEDICAID

## 2022-12-20 VITALS
DIASTOLIC BLOOD PRESSURE: 64 MMHG | HEIGHT: 61 IN | BODY MASS INDEX: 30.78 KG/M2 | OXYGEN SATURATION: 99 % | SYSTOLIC BLOOD PRESSURE: 112 MMHG | WEIGHT: 163 LBS | HEART RATE: 120 BPM | TEMPERATURE: 97.7 F

## 2022-12-20 DIAGNOSIS — B34.9 VIRAL ILLNESS: ICD-10-CM

## 2022-12-20 DIAGNOSIS — J02.9 SORE THROAT: ICD-10-CM

## 2022-12-20 DIAGNOSIS — J02.9 SORE THROAT: Primary | ICD-10-CM

## 2022-12-20 LAB — S PYO AG THROAT QL: NORMAL

## 2022-12-20 PROCEDURE — 87070 CULTURE OTHR SPECIMN AEROBIC: CPT

## 2022-12-20 PROCEDURE — 87880 STREP A ASSAY W/OPTIC: CPT | Performed by: FAMILY MEDICINE

## 2022-12-20 PROCEDURE — 86403 PARTICLE AGGLUT ANTBDY SCRN: CPT

## 2022-12-20 PROCEDURE — 99213 OFFICE O/P EST LOW 20 MIN: CPT | Performed by: FAMILY MEDICINE

## 2022-12-20 ASSESSMENT — ENCOUNTER SYMPTOMS
WHEEZING: 0
SHORTNESS OF BREATH: 0
CONSTIPATION: 0
DIARRHEA: 0
COUGH: 0
RHINORRHEA: 0
SORE THROAT: 1
ABDOMINAL PAIN: 0

## 2022-12-20 NOTE — PROGRESS NOTES
[address] [facility] [address] [place] [address] PRIMARY CARE  [address] [place] [address]  Dept: [phone]  Dept Fax: : [phone]     Chief Complaint  Chief Complaint   Patient presents with    Pharyngitis     Felt a numbing feeling yesterday, difficulty swallowing    Otalgia    Congestion     Overnight, used nasal spray with relief. HPI:  33 y. o.female who presents for the following:      URI symptoms: started yesterday again with congestion, ear pain, sore throat; no sick contacts; had been sick last week but recovered completely until yesterday; no fevers; no n/v; decreased appetite; taking mucinex nasal spray    Review of Systems   Constitutional:  Negative for chills and fever. HENT:  Positive for sore throat. Negative for congestion and rhinorrhea. Respiratory:  Negative for cough, shortness of breath and wheezing. Gastrointestinal:  Negative for abdominal pain, constipation and diarrhea. Endocrine: Negative for polydipsia and polyuria. Genitourinary:  Negative for dysuria, frequency and urgency. Neurological:  Negative for syncope, light-headedness, numbness and headaches. Psychiatric/Behavioral:  Negative for sleep disturbance. The patient is not nervous/anxious.       Past Medical History:   Diagnosis Date    ADHD     Anxiety     Arthritis     Arthritis     Chronic back pain     Chronic headaches     Depression     Fibromyalgia     Gastritis     Hx of degenerative disc disease     Irritable bowel syndrome     PTSD (post-traumatic stress disorder)      Past Surgical History:   Procedure Laterality Date    CHOLECYSTECTOMY      COLONOSCOPY  2021    TONSILLECTOMY      WISDOM TOOTH EXTRACTION       Social History     Socioeconomic History    Marital status: Single     Spouse name: Not on file    Number of children: Not on file    Years of education: Not on file    Highest education level: Not on file   Occupational History    Not on file   Tobacco Use    Smoking status: Never    Smokeless tobacco: Never   Vaping Use    Vaping Use: Every day    Substances: Nicotine   Substance and Sexual Activity    Alcohol use: Yes     Comment: socially 1-2 times per week    Drug use: Never    Sexual activity: Not on file   Other Topics Concern    Not on file   Social History Narrative    Not on file     Social Determinants of Health     Financial Resource Strain: Low Risk     Difficulty of Paying Living Expenses: Not hard at all   Food Insecurity: No Food Insecurity    Worried About Running Out of Food in the Last Year: Never true    Ran Out of Food in the Last Year: Never true   Transportation Needs: Not on file   Physical Activity: Not on file   Stress: Not on file   Social Connections: Not on file   Intimate Partner Violence: Not on file   Housing Stability: Not on file     Family History   Problem Relation Age of Onset    High Blood Pressure Mother     Arthritis Mother     Diabetes Mother     Stroke Mother     Arthritis Father     High Blood Pressure Father       No Known Allergies  Current Outpatient Medications   Medication Sig Dispense Refill    gabapentin (NEURONTIN) 100 MG capsule Take 2 capsules by mouth in the morning and 2 capsules before bedtime. 180 capsule 0    QUEtiapine (SEROQUEL) 100 MG tablet Take 1 tablet by mouth nightly 90 tablet 0    propranolol (INDERAL) 20 MG tablet Take one tablet by mouth three times daily as needed for anxiety. (Patient taking differently: PRN (has been taking this a lot less)) 90 tablet 3    levonorgestrel (LILETTA, 52 MG,) 20.1 MCG/DAY IUD IUD 52 mg 1 each by IntraUTERine route once       No current facility-administered medications for this visit.          Vitals:    12/20/22 1032   BP: 112/64   Site: Left Upper Arm   Position: Sitting   Cuff Size: Medium Adult   Pulse: (!) 120   Temp: 97.7 °F (36.5 °C)   TempSrc: Infrared   SpO2: 99%   Weight: 163 lb (73.9 kg)   Height: 5' 1\" (1.549 m) Physical exam:  Physical Exam  Vitals reviewed. Constitutional:       General: She is not in acute distress. Appearance: She is well-developed. HENT:      Head: Normocephalic and atraumatic. Right Ear: Tympanic membrane, ear canal and external ear normal. Tympanic membrane is not erythematous. Tympanic membrane has normal mobility. Left Ear: Tympanic membrane, ear canal and external ear normal. Tympanic membrane is not erythematous. Tympanic membrane has normal mobility. Nose: Nose normal.      Mouth/Throat:      Pharynx: No oropharyngeal exudate. Neck:      Thyroid: No thyromegaly. Cardiovascular:      Rate and Rhythm: Normal rate and regular rhythm. Heart sounds: Normal heart sounds. No murmur heard. Pulmonary:      Effort: Pulmonary effort is normal. No respiratory distress. Breath sounds: Normal breath sounds. No wheezing. Abdominal:      General: Bowel sounds are normal. There is no distension. Palpations: Abdomen is soft. Tenderness: There is no abdominal tenderness. There is no guarding or rebound. Musculoskeletal:      Cervical back: Normal range of motion. Lymphadenopathy:      Cervical: No cervical adenopathy. Skin:     General: Skin is warm and dry. Neurological:      Mental Status: She is alert and oriented to person, place, and time. Psychiatric:         Behavior: Behavior normal.       Assessment/Plan:  33 y.o. female here mainly for sore throat:  - likely viral pharyngitis; rapid strep neg; throat looks irritated; checking throat culture; supportive care for now     Diagnosis Orders   1. Sore throat  Culture, Throat      2. Viral illness  POCT rapid strep A           Return if symptoms worsen or fail to improve.     Navneet Lala MD

## 2022-12-20 NOTE — LETTER
[facility] Primary Care  [address] [address]  Phone: [phone]  Fax: [phone]    Yovani Padilla MD        December 20, 2022     Patient: Maki Fisher   YOB: 1990   Date of Visit: 12/20/2022       To Whom It May Concern:    Maki Fisher may return to work 12/21/22 without restrictions. If you have any questions or concerns, please don't hesitate to call.     Sincerely,        Yovani Padilla MD

## 2022-12-22 ASSESSMENT — ENCOUNTER SYMPTOMS
ABDOMINAL PAIN: 0
WHEEZING: 0
VOMITING: 1
SORE THROAT: 0
DIARRHEA: 1
SHORTNESS OF BREATH: 0
COUGH: 0
NAUSEA: 0
RHINORRHEA: 0
BACK PAIN: 0

## 2022-12-23 DIAGNOSIS — J02.0 STREP THROAT: Primary | ICD-10-CM

## 2022-12-23 LAB
ORGANISM: ABNORMAL
THROAT CULTURE: ABNORMAL
THROAT CULTURE: ABNORMAL

## 2022-12-23 RX ORDER — PENICILLIN V POTASSIUM 500 MG/1
500 TABLET ORAL 2 TIMES DAILY
Qty: 20 TABLET | Refills: 0 | Status: SHIPPED | OUTPATIENT
Start: 2022-12-23 | End: 2023-01-02

## 2022-12-26 ENCOUNTER — TELEPHONE (OUTPATIENT)
Dept: FAMILY MEDICINE CLINIC | Age: 32
End: 2022-12-26

## 2022-12-26 NOTE — TELEPHONE ENCOUNTER
Returned on call perfect serve voicemail regarding strep throat  and need for antibiotics. With reviewing chart, an antibiotic was sent to her pharmacy last Friday. She was not aware of this. She will notify me of any issues getting from the pharmacy. She also  mentions that her daughter who is 7 is complaining of  burning with urination. Mother has started cranberry juice. I advise increased water intake and discussing with pharmacist if any OTC medication for UTI can be appropriate for her. Also discussed that [facility] care services will start back up at 9 am tomorrow. If symptoms worsen, ER evaluation would be advised.      Electronically signed by CHRISTIANE Ramos, 9:57 AM [unfilled]

## 2022-12-26 NOTE — TELEPHONE ENCOUNTER
[facility] reached out again and this time regarding her daughter who is seven. Home urine testing shows WBC.    I  informed her that she could in fact take her to walk in clinic in [place] or [place] as both are open today until 5 pm.

## 2023-02-02 DIAGNOSIS — M54.42 CHRONIC BILATERAL LOW BACK PAIN WITH LEFT-SIDED SCIATICA: ICD-10-CM

## 2023-02-02 DIAGNOSIS — M79.7 FIBROMYALGIA: ICD-10-CM

## 2023-02-02 DIAGNOSIS — G89.29 CHRONIC BILATERAL LOW BACK PAIN WITH LEFT-SIDED SCIATICA: ICD-10-CM

## 2023-02-02 NOTE — TELEPHONE ENCOUNTER
Comments:     Last Office Visit (last PCP visit):   12/12/2022    Next Visit Date:  No future appointments. **If hasn't been seen in over a year OR hasn't followed up according to last diabetes/ADHD visit, make appointment for patient before sending refill to provider. Rx requested:  Requested Prescriptions     Pending Prescriptions Disp Refills    gabapentin (NEURONTIN) 100 MG capsule 180 capsule 0     Sig: Take 2 capsules by mouth in the morning and 2 capsules before bedtime.

## 2023-02-02 NOTE — TELEPHONE ENCOUNTER
----- Message from Frederic Stern sent at 2/2/2023  4:12 PM EST -----  Subject: Refill Request    QUESTIONS  Name of Medication? gabapentin (NEURONTIN) 100 MG capsule  Patient-reported dosage and instructions? Twice a day  How many days do you have left? 0  Preferred Pharmacy? [facility] phone number (if available)? [phone]  Additional Information for Provider? Patient stated that she she will need   have a script. Patient is requesting a callback once the Rx is sent  ---------------------------------------------------------------------------  --------------  [address]  What is the best way for the office to contact you? OK to leave message on   voicemail  Preferred Call Back Phone Number? [phone]  ---------------------------------------------------------------------------  --------------  SCRIPT ANSWERS  Relationship to Patient?  Self

## 2023-02-03 RX ORDER — GABAPENTIN 100 MG/1
CAPSULE ORAL
Qty: 180 CAPSULE | Refills: 0 | Status: SHIPPED | OUTPATIENT
Start: 2023-02-03 | End: 2023-03-04

## 2023-02-07 ENCOUNTER — OFFICE VISIT (OUTPATIENT)
Dept: INTERNAL MEDICINE | Age: 33
End: 2023-02-07
Payer: MEDICAID

## 2023-02-07 VITALS
HEIGHT: 61 IN | DIASTOLIC BLOOD PRESSURE: 82 MMHG | WEIGHT: 162 LBS | HEART RATE: 100 BPM | BODY MASS INDEX: 30.58 KG/M2 | SYSTOLIC BLOOD PRESSURE: 116 MMHG | RESPIRATION RATE: 16 BRPM | TEMPERATURE: 97.6 F | OXYGEN SATURATION: 98 %

## 2023-02-07 DIAGNOSIS — L70.0 CYSTIC ACNE VULGARIS: ICD-10-CM
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
## 2023-02-07 DIAGNOSIS — T14.8XXA MUSCLE STRAIN: Primary | ICD-10-CM

## 2023-02-07 PROCEDURE — G8417 CALC BMI ABV UP PARAM F/U: HCPCS | Performed by: PHYSICIAN ASSISTANT

## 2023-02-07 PROCEDURE — 99213 OFFICE O/P EST LOW 20 MIN: CPT | Performed by: PHYSICIAN ASSISTANT

## 2023-02-07 PROCEDURE — G8427 DOCREV CUR MEDS BY ELIG CLIN: HCPCS | Performed by: PHYSICIAN ASSISTANT

## 2023-02-07 PROCEDURE — 1036F TOBACCO NON-USER: CPT | Performed by: PHYSICIAN ASSISTANT

## 2023-02-07 PROCEDURE — G8484 FLU IMMUNIZE NO ADMIN: HCPCS | Performed by: PHYSICIAN ASSISTANT

## 2023-02-07 RX ORDER — ERYTHROMYCIN 20 MG/G
GEL TOPICAL
Qty: 60 G | Refills: 3 | Status: SHIPPED | OUTPATIENT
Start: 2023-02-07

## 2023-02-07 RX ORDER — ERYTHROMYCIN AND BENZOYL PEROXIDE 30; 50 MG/G; MG/G
GEL TOPICAL
Qty: 46.6 G | Refills: 1 | Status: SHIPPED | OUTPATIENT
Start: 2023-02-07 | End: 2023-02-07 | Stop reason: CLARIF

## 2023-02-07 RX ORDER — IBUPROFEN 200 MG
200 TABLET ORAL EVERY 6 HOURS PRN
COMMUNITY

## 2023-02-07 RX ORDER — METHYLPREDNISOLONE 4 MG/1
TABLET ORAL
Qty: 1 KIT | Refills: 0 | Status: SHIPPED | OUTPATIENT
Start: 2023-02-07 | End: 2023-02-13

## 2023-02-07 RX ORDER — CYCLOBENZAPRINE HCL 10 MG
10 TABLET ORAL 3 TIMES DAILY PRN
Qty: 30 TABLET | Refills: 0 | Status: SHIPPED | OUTPATIENT
Start: 2023-02-07 | End: 2023-02-17

## 2023-02-07 SDOH — ECONOMIC STABILITY: FOOD INSECURITY: WITHIN THE PAST 12 MONTHS, YOU WORRIED THAT YOUR FOOD WOULD RUN OUT BEFORE YOU GOT MONEY TO BUY MORE.: NEVER TRUE

## 2023-02-07 SDOH — ECONOMIC STABILITY: FOOD INSECURITY: WITHIN THE PAST 12 MONTHS, THE FOOD YOU BOUGHT JUST DIDN'T LAST AND YOU DIDN'T HAVE MONEY TO GET MORE.: NEVER TRUE

## 2023-02-07 SDOH — ECONOMIC STABILITY: INCOME INSECURITY: HOW HARD IS IT FOR YOU TO PAY FOR THE VERY BASICS LIKE FOOD, HOUSING, MEDICAL CARE, AND HEATING?: NOT HARD AT ALL

## 2023-02-07 SDOH — ECONOMIC STABILITY: HOUSING INSECURITY
IN THE LAST 12 MONTHS, WAS THERE A TIME WHEN YOU DID NOT HAVE A STEADY PLACE TO SLEEP OR SLEPT IN A SHELTER (INCLUDING NOW)?: NO

## 2023-02-07 ASSESSMENT — PATIENT HEALTH QUESTIONNAIRE - PHQ9
1. LITTLE INTEREST OR PLEASURE IN DOING THINGS: 1
8. MOVING OR SPEAKING SO SLOWLY THAT OTHER PEOPLE COULD HAVE NOTICED. OR THE OPPOSITE, BEING SO FIGETY OR RESTLESS THAT YOU HAVE BEEN MOVING AROUND A LOT MORE THAN USUAL: 0
SUM OF ALL RESPONSES TO PHQ9 QUESTIONS 1 & 2: 2
5. POOR APPETITE OR OVEREATING: 0
SUM OF ALL RESPONSES TO PHQ QUESTIONS 1-9: 2
6. FEELING BAD ABOUT YOURSELF - OR THAT YOU ARE A FAILURE OR HAVE LET YOURSELF OR YOUR FAMILY DOWN: 0
SUM OF ALL RESPONSES TO PHQ QUESTIONS 1-9: 2
SUM OF ALL RESPONSES TO PHQ QUESTIONS 1-9: 2
3. TROUBLE FALLING OR STAYING ASLEEP: 0
9. THOUGHTS THAT YOU WOULD BE BETTER OFF DEAD, OR OF HURTING YOURSELF: 0
10. IF YOU CHECKED OFF ANY PROBLEMS, HOW DIFFICULT HAVE THESE PROBLEMS MADE IT FOR YOU TO DO YOUR WORK, TAKE CARE OF THINGS AT HOME, OR GET ALONG WITH OTHER PEOPLE: 0
SUM OF ALL RESPONSES TO PHQ QUESTIONS 1-9: 2
4. FEELING TIRED OR HAVING LITTLE ENERGY: 0
2. FEELING DOWN, DEPRESSED OR HOPELESS: 1
7. TROUBLE CONCENTRATING ON THINGS, SUCH AS READING THE NEWSPAPER OR WATCHING TELEVISION: 0

## 2023-02-07 ASSESSMENT — ENCOUNTER SYMPTOMS: BACK PAIN: 1

## 2023-02-07 NOTE — PROGRESS NOTES
Shayne Essex (: 1990) is a 33 y.o. female, Established patient, here for evaluation of the following chief complaint(s):  Arm Pain (Pt fell down  3-4 wooden stairs over the weekend and caught herself is having pain in armpits and lower back and left neck, and shoulder pain, sitting makes constant pain in the back )        ASSESSMENT/PLAN:  1. Muscle strain  2. Injury due to fall, initial encounter  - rest an heat  , muscle relaxer and prednisone taper, he declined injection today   - cyclobenzaprine (FLEXERIL) 10 MG tablet; Take 1 tablet by mouth 3 times daily as needed for Muscle spasms  Dispense: 30 tablet; Refill: 0  - methylPREDNISolone (MEDROL DOSEPACK) 4 MG tablet; Take by mouth. Dispense: 1 kit; Refill: 0      3. Cystic acne vulgaris  - erythromycin (Emgel) 2% gel; Apply topically every other day  Dispense: 60g; Refill: 3  - tretinoin (RETIN-A) 0.025 % cream; Apply topically every other night  Dispense: 45 g; Refill: 0  - apply the med alternating days  , and slow down if severe drying occurs        No follow-ups on file. SUBJECTIVE/OBJECTIVE:  HPI    Fall down the stairs , over the weekend    Caught  herself with her arms as she went down  Now pain in the left mid to back. , upper arms ( axilla ) and bilateral neck and shoulder, left side is worse  States yesterday . She could barely raise the left arm, but better today       Cystic acne   Getting it for years  She uses Tretinoin in the past that helped, but her RX is            Review of Systems   Musculoskeletal:  Positive for back pain, myalgias and neck pain. Negative for gait problem, joint swelling and neck stiffness. All other systems reviewed and are negative. Physical Exam  Vitals reviewed. Musculoskeletal:      Right shoulder: No deformity or bony tenderness. Decreased range of motion. Left shoulder: Tenderness (posterior muscles) present. No deformity or bony tenderness. Decreased range of motion. Arms:       Cervical back: Normal range of motion. Muscular tenderness present. Normal range of motion. Skin:     Comments: Acne scars are minimal on the lower face     Neurological:      Mental Status: She is alert. Vitals:    02/07/23 1349   BP: 116/82   Site: Right Upper Arm   Position: Sitting   Cuff Size: Medium Adult   Pulse: 100   Resp: 16   Temp: 97.6 °F (36.4 °C)   SpO2: 98%   Weight: 162 lb (73.5 kg)   Height: 5' 1\" (1.549 m)                 An electronic signature was used to authenticate this note.     --CHARLES Irizarry

## 2023-02-10 DIAGNOSIS — F32.A DEPRESSION, UNSPECIFIED DEPRESSION TYPE: ICD-10-CM

## 2023-02-10 DIAGNOSIS — F41.9 ANXIETY: ICD-10-CM

## 2023-02-10 DIAGNOSIS — M79.7 FIBROMYALGIA: ICD-10-CM

## 2023-02-10 NOTE — TELEPHONE ENCOUNTER
Comments:     Last Office Visit (last PCP visit):   12/12/2022    Next Visit Date:  No future appointments. **If hasn't been seen in over a year OR hasn't followed up according to last diabetes/ADHD visit, make appointment for patient before sending refill to provider.     Rx requested:  Requested Prescriptions     Pending Prescriptions Disp Refills    QUEtiapine (SEROQUEL) 100 MG tablet 90 tablet 0     Sig: Take 1 tablet by mouth nightly

## 2023-02-11 RX ORDER — QUETIAPINE FUMARATE 100 MG/1
100 TABLET, FILM COATED ORAL NIGHTLY
Qty: 90 TABLET | Refills: 1 | Status: SHIPPED | OUTPATIENT
Start: 2023-02-11 | End: 2023-03-11 | Stop reason: SDUPTHER

## 2023-03-07 DIAGNOSIS — F32.A DEPRESSION, UNSPECIFIED DEPRESSION TYPE: ICD-10-CM

## 2023-03-07 DIAGNOSIS — F41.9 ANXIETY: ICD-10-CM

## 2023-03-07 DIAGNOSIS — M54.42 CHRONIC BILATERAL LOW BACK PAIN WITH LEFT-SIDED SCIATICA: ICD-10-CM

## 2023-03-07 DIAGNOSIS — G89.29 CHRONIC BILATERAL LOW BACK PAIN WITH LEFT-SIDED SCIATICA: ICD-10-CM

## 2023-03-07 DIAGNOSIS — M79.7 FIBROMYALGIA: ICD-10-CM

## 2023-03-07 NOTE — TELEPHONE ENCOUNTER
----- Message from Krystal Acevedo sent at 3/7/2023 10:25 AM EST -----  Subject: Refill Request    QUESTIONS  Name of Medication? gabapentin (NEURONTIN) 100 MG capsule  Patient-reported dosage and instructions? 4 X daily  How many days do you have left? 2  Preferred Pharmacy? [facility] phone number (if available)? [phone]  ---------------------------------------------------------------------------  --------------,  Name of Medication? QUEtiapine (SEROQUEL) 100 MG tablet  Patient-reported dosage and instructions? once daily  How many days do you have left? 6  Preferred Pharmacy? [facility] phone number (if available)? [phone]  ---------------------------------------------------------------------------  --------------  Keeley Torres INFO  What is the best way for the office to contact you? OK to leave message on   voicemail  Preferred Call Back Phone Number? [phone]  ---------------------------------------------------------------------------  --------------  SCRIPT ANSWERS  Relationship to Patient?  Self

## 2023-03-07 NOTE — TELEPHONE ENCOUNTER
Comments:     Last Office Visit (last PCP visit):   12/12/2022    Next Visit Date:  No future appointments. **If hasn't been seen in over a year OR hasn't followed up according to last diabetes/ADHD visit, make appointment for patient before sending refill to provider. Rx requested:  Requested Prescriptions     Pending Prescriptions Disp Refills    QUEtiapine (SEROQUEL) 100 MG tablet 90 tablet 1     Sig: Take 1 tablet by mouth nightly    gabapentin (NEURONTIN) 100 MG capsule 180 capsule 0     Sig: Take 2 capsules by mouth in the morning and 2 capsules before bedtime.

## 2023-03-10 DIAGNOSIS — L70.0 CYSTIC ACNE VULGARIS: ICD-10-CM

## 2023-03-11 RX ORDER — GABAPENTIN 100 MG/1
CAPSULE ORAL
Qty: 180 CAPSULE | Refills: 0 | Status: SHIPPED | OUTPATIENT
Start: 2023-03-11 | End: 2023-04-05

## 2023-03-11 RX ORDER — QUETIAPINE FUMARATE 100 MG/1
100 TABLET, FILM COATED ORAL NIGHTLY
Qty: 90 TABLET | Refills: 1 | Status: SHIPPED | OUTPATIENT
Start: 2023-03-11 | End: 2023-06-09

## 2023-03-15 ENCOUNTER — TELEPHONE (OUTPATIENT)
Dept: FAMILY MEDICINE CLINIC | Age: 33
End: 2023-03-15

## 2023-03-15 DIAGNOSIS — F32.A DEPRESSION, UNSPECIFIED DEPRESSION TYPE: ICD-10-CM

## 2023-03-15 DIAGNOSIS — F41.9 ANXIETY: ICD-10-CM

## 2023-03-15 RX ORDER — PROPRANOLOL HYDROCHLORIDE 20 MG/1
TABLET ORAL
Qty: 90 TABLET | Refills: 3 | Status: SHIPPED | OUTPATIENT
Start: 2023-03-15

## 2023-03-15 NOTE — TELEPHONE ENCOUNTER
[address] called to say pt needs a PA done for the tretinoin 0.025% gel, pt prefers that over the cream

## 2023-03-15 NOTE — TELEPHONE ENCOUNTER
Comments:     Last Office Visit (last PCP visit):   12/12/2022    Next Visit Date:  No future appointments. **If hasn't been seen in over a year OR hasn't followed up according to last diabetes/ADHD visit, make appointment for patient before sending refill to provider. Rx requested:  Requested Prescriptions     Pending Prescriptions Disp Refills    propranolol (INDERAL) 20 MG tablet [Pharmacy Med Name: propranolol 20 mg tablet] 90 tablet 3     Sig: Take one tablet by mouth three times daily as needed for anxiety.

## 2023-04-26 DIAGNOSIS — M54.42 CHRONIC BILATERAL LOW BACK PAIN WITH LEFT-SIDED SCIATICA: ICD-10-CM

## 2023-04-26 DIAGNOSIS — M79.7 FIBROMYALGIA: ICD-10-CM

## 2023-04-26 DIAGNOSIS — G89.29 CHRONIC BILATERAL LOW BACK PAIN WITH LEFT-SIDED SCIATICA: ICD-10-CM

## 2023-04-26 RX ORDER — GABAPENTIN 100 MG/1
CAPSULE ORAL
Qty: 180 CAPSULE | Refills: 0 | Status: SHIPPED | OUTPATIENT
Start: 2023-04-26 | End: 2023-05-21
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
## 2023-05-09 DIAGNOSIS — T14.8XXA MUSCLE STRAIN: ICD-10-CM

## 2023-05-09 RX ORDER — CYCLOBENZAPRINE HCL 10 MG
10 TABLET ORAL 3 TIMES DAILY PRN
Qty: 30 TABLET | Refills: 0 | Status: SHIPPED | OUTPATIENT
Start: 2023-05-09 | End: 2023-05-19

## 2023-05-09 NOTE — TELEPHONE ENCOUNTER
Comments:     Last Office Visit (last PCP visit):   12/12/2022    Next Visit Date:  No future appointments. **If hasn't been seen in over a year OR hasn't followed up according to last diabetes/ADHD visit, make appointment for patient before sending refill to provider.     Rx requested:  Requested Prescriptions     Pending Prescriptions Disp Refills    cyclobenzaprine (FLEXERIL) 10 MG tablet 30 tablet 0     Sig: Take 1 tablet by mouth 3 times daily as needed for Muscle spasms

## 2023-05-23 ENCOUNTER — OFFICE VISIT (OUTPATIENT)
Dept: FAMILY MEDICINE CLINIC | Age: 33
End: 2023-05-23
Payer: MEDICAID

## 2023-05-23 VITALS
SYSTOLIC BLOOD PRESSURE: 102 MMHG | HEART RATE: 70 BPM | DIASTOLIC BLOOD PRESSURE: 64 MMHG | OXYGEN SATURATION: 97 % | WEIGHT: 162.8 LBS | HEIGHT: 61 IN | BODY MASS INDEX: 30.73 KG/M2 | TEMPERATURE: 97.9 F

## 2023-05-23 DIAGNOSIS — M54.50 CHRONIC BILATERAL LOW BACK PAIN, UNSPECIFIED WHETHER SCIATICA PRESENT: Primary | ICD-10-CM

## 2023-05-23 DIAGNOSIS — G89.29 CHRONIC BILATERAL LOW BACK PAIN, UNSPECIFIED WHETHER SCIATICA PRESENT: Primary | ICD-10-CM

## 2023-05-23 PROCEDURE — 1036F TOBACCO NON-USER: CPT | Performed by: FAMILY MEDICINE

## 2023-05-23 PROCEDURE — 99213 OFFICE O/P EST LOW 20 MIN: CPT | Performed by: FAMILY MEDICINE

## 2023-05-23 PROCEDURE — G8417 CALC BMI ABV UP PARAM F/U: HCPCS | Performed by: FAMILY MEDICINE

## 2023-05-23 PROCEDURE — G8427 DOCREV CUR MEDS BY ELIG CLIN: HCPCS | Performed by: FAMILY MEDICINE

## 2023-05-23 RX ORDER — GABAPENTIN 300 MG/1
300 CAPSULE ORAL 2 TIMES DAILY
Qty: 180 CAPSULE | Refills: 1 | Status: SHIPPED | OUTPATIENT
Start: 2023-05-23 | End: 2023-08-21

## 2023-05-23 RX ORDER — BACLOFEN 10 MG/1
10 TABLET ORAL 2 TIMES DAILY PRN
Qty: 60 TABLET | Refills: 1 | Status: SHIPPED | OUTPATIENT
Start: 2023-05-23

## 2023-05-23 RX ORDER — METHYLPREDNISOLONE 4 MG/1
TABLET ORAL
Qty: 1 KIT | Refills: 0 | Status: SHIPPED | OUTPATIENT
Start: 2023-05-23 | End: 2023-05-29

## 2023-05-23 ASSESSMENT — ENCOUNTER SYMPTOMS
DIARRHEA: 0
BACK PAIN: 1
RHINORRHEA: 0
ABDOMINAL PAIN: 0
CONSTIPATION: 0
SORE THROAT: 0
WHEEZING: 0
SHORTNESS OF BREATH: 0
COUGH: 0

## 2023-05-23 NOTE — PROGRESS NOTES
on file    Years of education: Not on file    Highest education level: Not on file   Occupational History    Not on file   Tobacco Use    Smoking status: Never    Smokeless tobacco: Never   Vaping Use    Vaping Use: Every day    Substances: Nicotine   Substance and Sexual Activity    Alcohol use: Yes     Comment: socially 1-2 times per week    Drug use: Never    Sexual activity: Not on file   Other Topics Concern    Not on file   Social History Narrative    Not on file     Social Determinants of Health     Financial Resource Strain: Low Risk     Difficulty of Paying Living Expenses: Not hard at all   Food Insecurity: No Food Insecurity    Worried About [email or web address] in the Last Year: Never true    [address] [employer] in the Last Year: Never true   Transportation Needs: Unknown    Lack of Transportation (Medical): Not on file    Lack of Transportation (Non-Medical): No   Physical Activity: Not on file   Stress: Not on file   Social Connections: Not on file   Intimate Partner Violence: Not on file   Housing Stability: Unknown    Unable to Pay for Housing in the Last Year: Not on file    Number of Places Lived in the Last Year: Not on file    Unstable Housing in the Last Year: No     Family History   Problem Relation Age of Onset    High Blood Pressure Mother     Arthritis Mother     Diabetes Mother     Stroke Mother     Arthritis Father     High Blood Pressure Father       No Known Allergies  Current Outpatient Medications   Medication Sig Dispense Refill    methylPREDNISolone (MEDROL DOSEPACK) 4 MG tablet Take by mouth. 1 kit 0    gabapentin (NEURONTIN) 300 MG capsule Take 1 capsule by mouth 2 times daily for 90 days. Intended supply: 90 days 180 capsule 1    baclofen (LIORESAL) 10 MG tablet Take 1 tablet by mouth 2 times daily as needed (back spasms) 60 tablet 1    propranolol (INDERAL) 20 MG tablet Take one tablet by mouth three times daily as needed for anxiety.  90 tablet 3    QUEtiapine (SEROQUEL) 100 MG tablet

## 2023-06-20 ENCOUNTER — OFFICE VISIT (OUTPATIENT)
Dept: FAMILY MEDICINE CLINIC | Age: 33
End: 2023-06-20
Payer: MEDICAID

## 2023-06-20 ENCOUNTER — HOSPITAL ENCOUNTER (OUTPATIENT)
Dept: GENERAL RADIOLOGY | Age: 33
Discharge: HOME | End: 2023-06-22
Payer: MEDICAID

## 2023-06-20 ENCOUNTER — HOSPITAL ENCOUNTER (OUTPATIENT)
Age: 33
Discharge: HOME | End: 2023-06-22
Payer: MEDICAID

## 2023-06-20 VITALS
BODY MASS INDEX: 30.58 KG/M2 | WEIGHT: 162 LBS | HEIGHT: 61 IN | DIASTOLIC BLOOD PRESSURE: 72 MMHG | HEART RATE: 91 BPM | TEMPERATURE: 98.7 F | OXYGEN SATURATION: 97 % | SYSTOLIC BLOOD PRESSURE: 120 MMHG

## 2023-06-20 DIAGNOSIS — S69.92XA INJURY OF FINGER OF LEFT HAND, INITIAL ENCOUNTER: Primary | ICD-10-CM

## 2023-06-20 DIAGNOSIS — S69.92XA INJURY OF FINGER OF LEFT HAND, INITIAL ENCOUNTER: ICD-10-CM

## 2023-06-20 PROCEDURE — G8427 DOCREV CUR MEDS BY ELIG CLIN: HCPCS

## 2023-06-20 PROCEDURE — 1036F TOBACCO NON-USER: CPT

## 2023-06-20 PROCEDURE — 99213 OFFICE O/P EST LOW 20 MIN: CPT

## 2023-06-20 PROCEDURE — G8417 CALC BMI ABV UP PARAM F/U: HCPCS

## 2023-06-20 PROCEDURE — 73140 X-RAY EXAM OF FINGER(S): CPT

## 2023-06-20 ASSESSMENT — ENCOUNTER SYMPTOMS
COLOR CHANGE: 0
RESPIRATORY NEGATIVE: 1

## 2023-06-20 NOTE — PROGRESS NOTES
[address]          ASSESSMENT/PLAN     Casandra Vázquez is a 33 y.o. female who presents with:  Finger pain and swelling moderate constant pain mild swelling. Injury to left second finger this morning when grabbing her dog by the collar. 1. Injury of finger of left hand, initial encounter  Finger splint applied MSPs intact, advised to ice a couple times daily use ibuprofen for pain we will notify her of results of x-ray. -     XR FINGER LEFT (MIN 2 VIEWS); Future           PATIENT REFERRED TO:  Return if symptoms worsen or fail to improve. DISCHARGE MEDICATIONS:  New Prescriptions    No medications on file     Cannot display discharge medications since this is not an admission. Nneka Palomo, APRN - CNP    CHIEF COMPLAINT       Chief Complaint   Patient presents with    Finger Pain     Left hand pointer finger pain since this morning pt states finger got stuck on gods collar          SUBJECTIVE/REVIEW OF SYSTEMS     Review of Systems   Constitutional: Negative. Respiratory: Negative. Cardiovascular: Negative. Genitourinary: Negative. Musculoskeletal:  Positive for arthralgias and joint swelling. Negative for myalgias. Skin:  Negative for color change. Neurological: Negative. Hematological: Negative. Psychiatric/Behavioral: Negative. OBJECTIVE/PHYSICAL EXAM     Physical Exam  Vitals reviewed. Constitutional:       Appearance: Normal appearance. Cardiovascular:      Rate and Rhythm: Normal rate. Pulses: Normal pulses. Pulmonary:      Effort: Pulmonary effort is normal.   Abdominal:      General: Abdomen is flat. Musculoskeletal:         General: Swelling and tenderness present. No deformity or signs of injury. Left hand: Bony tenderness present. Decreased range of motion. Comments: Mild swelling to PIP joint of left second finger. No bruising abrasions or erythremia   Skin:     General: Skin is warm and dry.       Capillary

## 2023-06-21 NOTE — RESULT ENCOUNTER NOTE
Please contact pt and let them know the X ray was negative for any fracture or dislocation. Continue with current treatment plan. Wear finger splint for support and comfort discontinue use as pain improves.   If symptoms have not returned to n near normal in the next week notify me and I will send a referral for orthopedics
Admitted

## 2023-06-27 ENCOUNTER — OFFICE VISIT (OUTPATIENT)
Dept: ORTHOPEDIC SURGERY | Age: 33
End: 2023-06-27
Payer: MEDICAID

## 2023-06-27 ENCOUNTER — HOSPITAL ENCOUNTER (OUTPATIENT)
Dept: MRI IMAGING | Age: 33
Discharge: HOME | End: 2023-06-29
Payer: MEDICAID

## 2023-06-27 VITALS
OXYGEN SATURATION: 99 % | BODY MASS INDEX: 29.64 KG/M2 | HEIGHT: 61 IN | SYSTOLIC BLOOD PRESSURE: 114 MMHG | HEART RATE: 78 BPM | DIASTOLIC BLOOD PRESSURE: 81 MMHG | WEIGHT: 157 LBS

## 2023-06-27 DIAGNOSIS — S66.819A STRAIN OF FLEXOR DIGITORUM PROFUNDUS TENDON: Primary | ICD-10-CM

## 2023-06-27 DIAGNOSIS — S66.819A STRAIN OF FLEXOR DIGITORUM PROFUNDUS TENDON: ICD-10-CM

## 2023-06-27 PROCEDURE — G8417 CALC BMI ABV UP PARAM F/U: HCPCS | Performed by: PHYSICIAN ASSISTANT

## 2023-06-27 PROCEDURE — G8427 DOCREV CUR MEDS BY ELIG CLIN: HCPCS | Performed by: PHYSICIAN ASSISTANT

## 2023-06-27 PROCEDURE — 1036F TOBACCO NON-USER: CPT | Performed by: PHYSICIAN ASSISTANT

## 2023-06-27 PROCEDURE — 73218 MRI UPPER EXTREMITY W/O DYE: CPT

## 2023-06-27 PROCEDURE — 99204 OFFICE O/P NEW MOD 45 MIN: CPT | Performed by: PHYSICIAN ASSISTANT

## 2023-07-13 DIAGNOSIS — M54.50 CHRONIC BILATERAL LOW BACK PAIN, UNSPECIFIED WHETHER SCIATICA PRESENT: ICD-10-CM

## 2023-07-13 DIAGNOSIS — G89.29 CHRONIC BILATERAL LOW BACK PAIN, UNSPECIFIED WHETHER SCIATICA PRESENT: ICD-10-CM

## 2023-07-13 RX ORDER — BACLOFEN 10 MG/1
10 TABLET ORAL 2 TIMES DAILY PRN
Qty: 60 TABLET | Refills: 1 | Status: SHIPPED | OUTPATIENT
Start: 2023-07-13

## 2023-07-13 NOTE — TELEPHONE ENCOUNTER
Comments:     Last Office Visit (last PCP visit):   5/23/2023    Next Visit Date:  Future Appointments   Date Time Provider [address]   7/18/2023 10:00 AM Santo Rausch PA-C [facility]       **If hasn't been seen in over a year OR hasn't followed up according to last diabetes/ADHD visit, make appointment for patient before sending refill to provider.     Rx requested:  Requested Prescriptions     Pending Prescriptions Disp Refills    baclofen (LIORESAL) 10 MG tablet [Pharmacy Med Name: baclofen 10 mg tablet] 60 tablet 1     Sig: Take 1 tablet by mouth 2 times daily as needed (back spasms)             '

## 2023-07-20 DIAGNOSIS — M54.50 CHRONIC BILATERAL LOW BACK PAIN, UNSPECIFIED WHETHER SCIATICA PRESENT: ICD-10-CM

## 2023-07-20 DIAGNOSIS — G89.29 CHRONIC BILATERAL LOW BACK PAIN, UNSPECIFIED WHETHER SCIATICA PRESENT: ICD-10-CM

## 2023-07-21 RX ORDER — GABAPENTIN 300 MG/1
300 CAPSULE ORAL 2 TIMES DAILY
Qty: 180 CAPSULE | Refills: 1 | Status: SHIPPED | OUTPATIENT
Start: 2023-07-21 | End: 2023-10-19

## 2023-07-21 NOTE — TELEPHONE ENCOUNTER
Comments:     Last Office Visit (last PCP visit):   5/23/2023    Next Visit Date:  No future appointments. **If hasn't been seen in over a year OR hasn't followed up according to last diabetes/ADHD visit, make appointment for patient before sending refill to provider. Rx requested:  Requested Prescriptions     Pending Prescriptions Disp Refills    gabapentin (NEURONTIN) 300 MG capsule 180 capsule 1     Sig: Take 1 capsule by mouth 2 times daily for 90 days.  Intended supply: 90 days

## 2023-08-08 DIAGNOSIS — M79.7 FIBROMYALGIA: ICD-10-CM

## 2023-08-08 DIAGNOSIS — F32.A DEPRESSION, UNSPECIFIED DEPRESSION TYPE: ICD-10-CM

## 2023-08-08 DIAGNOSIS — F41.9 ANXIETY: ICD-10-CM

## 2023-08-09 RX ORDER — QUETIAPINE FUMARATE 100 MG/1
100 TABLET, FILM COATED ORAL NIGHTLY
Qty: 90 TABLET | Refills: 1 | Status: SHIPPED | OUTPATIENT
Start: 2023-08-09 | End: 2023-11-07

## 2023-08-09 NOTE — TELEPHONE ENCOUNTER
Comments:     Last Office Visit (last PCP visit):   5/23/2023    Next Visit Date:  No future appointments. **If hasn't been seen in over a year OR hasn't followed up according to last diabetes/ADHD visit, make appointment for patient before sending refill to provider.     Rx requested:  Requested Prescriptions     Pending Prescriptions Disp Refills    QUEtiapine (SEROQUEL) 100 MG tablet 90 tablet 1     Sig: Take 1 tablet by mouth nightly

## 2023-09-13 DIAGNOSIS — G89.29 CHRONIC BILATERAL LOW BACK PAIN, UNSPECIFIED WHETHER SCIATICA PRESENT: ICD-10-CM

## 2023-09-13 DIAGNOSIS — M54.50 CHRONIC BILATERAL LOW BACK PAIN, UNSPECIFIED WHETHER SCIATICA PRESENT: ICD-10-CM

## 2023-09-14 RX ORDER — BACLOFEN 10 MG/1
10 TABLET ORAL 2 TIMES DAILY PRN
Qty: 60 TABLET | Refills: 1 | Status: SHIPPED | OUTPATIENT
Start: 2023-09-14

## 2023-09-14 NOTE — TELEPHONE ENCOUNTER
Comments:     Last Office Visit (last PCP visit):   5/23/2023    Next Visit Date:  No future appointments. **If hasn't been seen in over a year OR hasn't followed up according to last diabetes/ADHD visit, make appointment for patient before sending refill to provider.     Rx requested:  Requested Prescriptions     Pending Prescriptions Disp Refills    baclofen (LIORESAL) 10 MG tablet 60 tablet 1     Sig: Take 1 tablet by mouth 2 times daily as needed (back spasms)

## 2023-09-18 ENCOUNTER — HOSPITAL ENCOUNTER (EMERGENCY)
Age: 33
Discharge: HOME | End: 2023-09-18
Attending: EMERGENCY MEDICINE
Payer: MEDICAID

## 2023-09-18 ENCOUNTER — APPOINTMENT (OUTPATIENT)
Dept: GENERAL RADIOLOGY | Age: 33
End: 2023-09-18
Payer: MEDICAID

## 2023-09-18 VITALS
HEIGHT: 61 IN | SYSTOLIC BLOOD PRESSURE: 117 MMHG | TEMPERATURE: 97.7 F | WEIGHT: 155 LBS | RESPIRATION RATE: 16 BRPM | HEART RATE: 64 BPM | OXYGEN SATURATION: 98 % | DIASTOLIC BLOOD PRESSURE: 62 MMHG | BODY MASS INDEX: 29.27 KG/M2

## 2023-09-18 DIAGNOSIS — G89.29 CHRONIC BILATERAL LOW BACK PAIN, UNSPECIFIED WHETHER SCIATICA PRESENT: ICD-10-CM

## 2023-09-18 DIAGNOSIS — M54.50 CHRONIC BILATERAL LOW BACK PAIN, UNSPECIFIED WHETHER SCIATICA PRESENT: ICD-10-CM

## 2023-09-18 DIAGNOSIS — M79.601 RIGHT ARM PAIN: Primary | ICD-10-CM

## 2023-09-18 DIAGNOSIS — R07.89 ATYPICAL CHEST PAIN: ICD-10-CM

## 2023-09-18 LAB
ALBUMIN SERPL-MCNC: 4.3 G/DL (ref 3.5–4.6)
ALP SERPL-CCNC: 44 U/L (ref 40–130)
ALT SERPL-CCNC: <5 U/L (ref 0–33)
ANION GAP SERPL CALCULATED.3IONS-SCNC: 11 MEQ/L (ref 9–15)
AST SERPL-CCNC: 12 U/L (ref 0–35)
BASOPHILS # BLD: 0.1 K/UL (ref 0–0.1)
BASOPHILS NFR BLD: 1.1 % (ref 0.1–1.2)
BILIRUB SERPL-MCNC: <0.2 MG/DL (ref 0.2–0.7)
BUN SERPL-MCNC: 15 MG/DL (ref 6–20)
CALCIUM SERPL-MCNC: 9.1 MG/DL (ref 8.5–9.9)
CHLORIDE SERPL-SCNC: 103 MEQ/L (ref 95–107)
CO2 SERPL-SCNC: 23 MEQ/L (ref 20–31)
CREAT SERPL-MCNC: 0.57 MG/DL (ref 0.5–0.9)
D DIMER PPP FEU-MCNC: 0.39 MG/L FEU (ref 0–0.5)
EKG ATRIAL RATE: 90 BPM
EKG P AXIS: 64 DEGREES
EKG P-R INTERVAL: 154 MS
EKG Q-T INTERVAL: 350 MS
EKG QRS DURATION: 82 MS
EKG QTC CALCULATION (BAZETT): 428 MS
EKG R AXIS: 45 DEGREES
EKG T AXIS: 34 DEGREES
EKG VENTRICULAR RATE: 90 BPM
EOSINOPHIL # BLD: 0.4 K/UL (ref 0–0.4)
EOSINOPHIL NFR BLD: 6.8 % (ref 0.7–5.8)
ERYTHROCYTE [DISTWIDTH] IN BLOOD BY AUTOMATED COUNT: 12.3 % (ref 11.7–14.4)
GLOBULIN SER CALC-MCNC: 2.7 G/DL (ref 2.3–3.5)
GLUCOSE SERPL-MCNC: 97 MG/DL (ref 70–99)
HCG SERPL QL: NEGATIVE
HCT VFR BLD AUTO: 37.7 % (ref 37–47)
HGB BLD-MCNC: 12.8 G/DL (ref 11.2–15.7)
IMM GRANULOCYTES # BLD: 0 K/UL
IMM GRANULOCYTES NFR BLD: 0.2 %
INR PPP: 1
LYMPHOCYTES # BLD: 1.7 K/UL (ref 1.2–3.7)
LYMPHOCYTES NFR BLD: 30.2 %
MAGNESIUM SERPL-MCNC: 1.9 MG/DL (ref 1.7–2.4)
MCH RBC QN AUTO: 31 PG (ref 25.6–32.2)
MCHC RBC AUTO-ENTMCNC: 34 % (ref 32.2–35.5)
MCV RBC AUTO: 91.3 FL (ref 79.4–94.8)
MONOCYTES # BLD: 0.4 K/UL (ref 0.2–0.9)
MONOCYTES NFR BLD: 7.5 % (ref 4.7–12.5)
NEUTROPHILS # BLD: 3 K/UL (ref 1.6–6.1)
NEUTS SEG NFR BLD: 54.2 % (ref 34–71.1)
PLATELET # BLD AUTO: 281 K/UL (ref 182–369)
POTASSIUM SERPL-SCNC: 4 MEQ/L (ref 3.4–4.9)
PROT SERPL-MCNC: 7 G/DL (ref 6.3–8)
PROTHROMBIN TIME: 13.4 SEC (ref 12.3–14.9)
RBC # BLD AUTO: 4.13 M/UL (ref 3.93–5.22)
SODIUM SERPL-SCNC: 137 MEQ/L (ref 135–144)
TROPONIN T SERPL-MCNC: <0.01 NG/ML (ref 0–0.01)
TSH SERPL-MCNC: 1.24 UIU/ML (ref 0.44–3.86)
WBC # BLD AUTO: 5.5 K/UL (ref 4–10)

## 2023-09-18 PROCEDURE — 85379 FIBRIN DEGRADATION QUANT: CPT

## 2023-09-18 PROCEDURE — 36415 COLL VENOUS BLD VENIPUNCTURE: CPT

## 2023-09-18 PROCEDURE — 93005 ELECTROCARDIOGRAM TRACING: CPT

## 2023-09-18 PROCEDURE — 84703 CHORIONIC GONADOTROPIN ASSAY: CPT

## 2023-09-18 PROCEDURE — 96374 THER/PROPH/DIAG INJ IV PUSH: CPT

## 2023-09-18 PROCEDURE — 84484 ASSAY OF TROPONIN QUANT: CPT

## 2023-09-18 PROCEDURE — 85610 PROTHROMBIN TIME: CPT

## 2023-09-18 PROCEDURE — 6360000002 HC RX W HCPCS: Performed by: EMERGENCY MEDICINE

## 2023-09-18 PROCEDURE — 99285 EMERGENCY DEPT VISIT HI MDM: CPT

## 2023-09-18 PROCEDURE — 93010 ELECTROCARDIOGRAM REPORT: CPT | Performed by: INTERNAL MEDICINE

## 2023-09-18 PROCEDURE — 85025 COMPLETE CBC W/AUTO DIFF WBC: CPT

## 2023-09-18 PROCEDURE — 71045 X-RAY EXAM CHEST 1 VIEW: CPT

## 2023-09-18 PROCEDURE — 83735 ASSAY OF MAGNESIUM: CPT

## 2023-09-18 PROCEDURE — 80053 COMPREHEN METABOLIC PANEL: CPT

## 2023-09-18 PROCEDURE — 84443 ASSAY THYROID STIM HORMONE: CPT

## 2023-09-18 RX ORDER — GABAPENTIN 300 MG/1
300 CAPSULE ORAL 2 TIMES DAILY
Qty: 180 CAPSULE | Refills: 1 | Status: SHIPPED | OUTPATIENT
Start: 2023-09-18 | End: 2023-12-17

## 2023-09-18 RX ORDER — KETOROLAC TROMETHAMINE 30 MG/ML
30 INJECTION, SOLUTION INTRAMUSCULAR; INTRAVENOUS ONCE
Status: COMPLETED | OUTPATIENT
Start: 2023-09-18 | End: 2023-09-18

## 2023-09-18 RX ADMIN — KETOROLAC TROMETHAMINE 30 MG: 30 INJECTION, SOLUTION INTRAMUSCULAR; INTRAVENOUS at 11:03

## 2023-09-18 ASSESSMENT — PAIN - FUNCTIONAL ASSESSMENT
PAIN_FUNCTIONAL_ASSESSMENT: 0-10
PAIN_FUNCTIONAL_ASSESSMENT: 0-10

## 2023-09-18 ASSESSMENT — PAIN SCALES - GENERAL
PAINLEVEL_OUTOF10: 5
PAINLEVEL_OUTOF10: 2

## 2023-09-18 ASSESSMENT — LIFESTYLE VARIABLES
HOW OFTEN DO YOU HAVE A DRINK CONTAINING ALCOHOL: MONTHLY OR LESS
HOW MANY STANDARD DRINKS CONTAINING ALCOHOL DO YOU HAVE ON A TYPICAL DAY: 1 OR 2

## 2023-09-18 ASSESSMENT — ENCOUNTER SYMPTOMS
CHEST TIGHTNESS: 0
FACIAL SWELLING: 0
TROUBLE SWALLOWING: 0
CHOKING: 0
WHEEZING: 0
DIARRHEA: 1
BACK PAIN: 0
EYE DISCHARGE: 0
CONSTIPATION: 0
EYE PAIN: 0
EYE REDNESS: 0
BLOOD IN STOOL: 0
VOICE CHANGE: 0
SINUS PRESSURE: 0
SHORTNESS OF BREATH: 0
SORE THROAT: 0
VOMITING: 0
COUGH: 0
ABDOMINAL PAIN: 0
STRIDOR: 0

## 2023-09-18 NOTE — ED TRIAGE NOTES
Pt presents via private vehicle for concern of right upper arm pain. NKI. No swelling/redness noted. Pt also states she has intermittent cp since Friday.  She describes it as a pressure and as if something is sitting on her chest.

## 2023-09-18 NOTE — ED PROVIDER NOTES
DISPOSITION        PATIENT REFERRED TO:  Jason George, APRN - CNP  [address] [phone]    In 2 days  If symptoms worsen      DISCHARGE MEDICATIONS:  New Prescriptions    No medications on file          (Please note that portions of this note were completed with a voice recognition program.  Efforts were made to edit the dictations but occasionally words are mis-transcribed.)    Leslie Vasquez MD (electronically signed)  Attending Emergency Physician        Leslie Vasquez MD  09/18/23 [id number]

## 2023-09-27 ENCOUNTER — OFFICE VISIT (OUTPATIENT)
Dept: INTERNAL MEDICINE | Age: 33
End: 2023-09-27
Payer: MEDICAID

## 2023-09-27 VITALS
BODY MASS INDEX: 30.09 KG/M2 | HEART RATE: 92 BPM | SYSTOLIC BLOOD PRESSURE: 120 MMHG | WEIGHT: 159.4 LBS | HEIGHT: 61 IN | DIASTOLIC BLOOD PRESSURE: 74 MMHG | OXYGEN SATURATION: 97 %

## 2023-09-27 DIAGNOSIS — M79.7 FIBROMYALGIA: ICD-10-CM

## 2023-09-27 DIAGNOSIS — F32.A DEPRESSION, UNSPECIFIED DEPRESSION TYPE: ICD-10-CM

## 2023-09-27 DIAGNOSIS — F41.9 ANXIETY: ICD-10-CM

## 2023-09-27 PROCEDURE — G8417 CALC BMI ABV UP PARAM F/U: HCPCS | Performed by: FAMILY MEDICINE

## 2023-09-27 PROCEDURE — G8427 DOCREV CUR MEDS BY ELIG CLIN: HCPCS | Performed by: FAMILY MEDICINE

## 2023-09-27 PROCEDURE — 1036F TOBACCO NON-USER: CPT | Performed by: FAMILY MEDICINE

## 2023-09-27 PROCEDURE — 99214 OFFICE O/P EST MOD 30 MIN: CPT | Performed by: FAMILY MEDICINE

## 2023-09-27 RX ORDER — DIAZEPAM 5 MG/1
5 TABLET ORAL DAILY PRN
Qty: 10 TABLET | Refills: 0 | Status: SHIPPED | OUTPATIENT
Start: 2023-09-27 | End: 2023-10-07

## 2023-09-27 RX ORDER — QUETIAPINE FUMARATE 100 MG/1
150 TABLET, FILM COATED ORAL NIGHTLY
Qty: 135 TABLET | Refills: 1 | Status: SHIPPED | OUTPATIENT
Start: 2023-09-27 | End: 2024-03-25

## 2023-09-27 ASSESSMENT — ENCOUNTER SYMPTOMS
CONSTIPATION: 0
SORE THROAT: 0
ABDOMINAL PAIN: 0
RHINORRHEA: 0
SHORTNESS OF BREATH: 0
COUGH: 0
DIARRHEA: 0
WHEEZING: 0

## 2023-09-27 NOTE — PROGRESS NOTES
[address] [place] [address] PRIMARY CARE  [address] [place] [address]  Dept: [phone]  Dept Fax: [phone]: [phone]     Chief Complaint  Chief Complaint   Patient presents with    Follow-up     [facility] ED on 9/18/2023 for chest discomfort. Woke up this morning feeling like she cannot open her arms due to the pain. Has shooting pain that goes down her right arm. HPI:  33 y. 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 HENT:  Negative for congestion, rhinorrhea and sore throat. Respiratory:  Negative for cough, shortness of breath and wheezing. Gastrointestinal:  Negative for abdominal pain, constipation and diarrhea. Endocrine: Negative for polydipsia and polyuria. Genitourinary:  Negative for dysuria, frequency and urgency. Neurological:  Negative for syncope, light-headedness, numbness and headaches. Psychiatric/Behavioral:  Negative for sleep disturbance. The patient is nervous/anxious.         Past Medical History:   Diagnosis Date    ADHD     Anxiety     Arthritis     Arthritis     Chronic back pain     Chronic headaches     Depression     Fibromyalgia

## 2024-01-09 ENCOUNTER — HOSPITAL ENCOUNTER (EMERGENCY)
Age: 34
Discharge: HOME | End: 2024-01-09
Attending: EMERGENCY MEDICINE
Payer: MEDICAID

## 2024-01-09 VITALS
HEART RATE: 99 BPM | HEIGHT: 64 IN | RESPIRATION RATE: 18 BRPM | DIASTOLIC BLOOD PRESSURE: 98 MMHG | WEIGHT: 160 LBS | OXYGEN SATURATION: 97 % | BODY MASS INDEX: 27.31 KG/M2 | TEMPERATURE: 99.3 F | SYSTOLIC BLOOD PRESSURE: 150 MMHG

## 2024-01-09 DIAGNOSIS — G89.29 ACUTE EXACERBATION OF CHRONIC LOW BACK PAIN: ICD-10-CM

## 2024-01-09 DIAGNOSIS — M54.50 ACUTE EXACERBATION OF CHRONIC LOW BACK PAIN: ICD-10-CM

## 2024-01-09 DIAGNOSIS — B34.9 VIRAL ILLNESS: Primary | ICD-10-CM

## 2024-01-09 LAB
INFLUENZA A BY PCR: NEGATIVE
INFLUENZA B BY PCR: NEGATIVE
SARS-COV-2 RDRP RESP QL NAA+PROBE: NOT DETECTED
STREP GRP A PCR: NEGATIVE

## 2024-01-09 PROCEDURE — 6370000000 HC RX 637 (ALT 250 FOR IP): Performed by: EMERGENCY MEDICINE

## 2024-01-09 PROCEDURE — 87635 SARS-COV-2 COVID-19 AMP PRB: CPT

## 2024-01-09 PROCEDURE — 87651 STREP A DNA AMP PROBE: CPT

## 2024-01-09 PROCEDURE — 87502 INFLUENZA DNA AMP PROBE: CPT

## 2024-01-09 PROCEDURE — 99283 EMERGENCY DEPT VISIT LOW MDM: CPT

## 2024-01-09 RX ORDER — CYCLOBENZAPRINE HCL 10 MG
10 TABLET ORAL ONCE
Status: COMPLETED | OUTPATIENT
Start: 2024-01-09 | End: 2024-01-09

## 2024-01-09 RX ORDER — PREDNISONE 20 MG/1
40 TABLET ORAL DAILY
Qty: 8 TABLET | Refills: 0 | Status: SHIPPED | OUTPATIENT
Start: 2024-01-09 | End: 2024-01-13

## 2024-01-09 RX ORDER — CYCLOBENZAPRINE HCL 10 MG
10 TABLET ORAL 3 TIMES DAILY PRN
Qty: 12 TABLET | Refills: 0 | Status: SHIPPED | OUTPATIENT
Start: 2024-01-09 | End: 2024-01-13

## 2024-01-09 RX ORDER — PREDNISONE 20 MG/1
40 TABLET ORAL ONCE
Status: COMPLETED | OUTPATIENT
Start: 2024-01-09 | End: 2024-01-09

## 2024-01-09 RX ADMIN — PREDNISONE 40 MG: 20 TABLET ORAL at 17:41

## 2024-01-09 RX ADMIN — CYCLOBENZAPRINE 10 MG: 10 TABLET, FILM COATED ORAL at 17:41

## 2024-01-09 ASSESSMENT — ENCOUNTER SYMPTOMS
SINUS PAIN: 0
TROUBLE SWALLOWING: 0
SORE THROAT: 1
BACK PAIN: 1
VOMITING: 0
SHORTNESS OF BREATH: 0
NAUSEA: 0
DIARRHEA: 1
EYE DISCHARGE: 0
ABDOMINAL PAIN: 0
EYE REDNESS: 0
COLOR CHANGE: 0
COUGH: 0

## 2024-01-09 NOTE — ED PROVIDER NOTES
LEVONORGESTREL (LILETTA, 52 MG,) 20.1 MCG/DAY IUD IUD 52 MG    1 each by IntraUTERine route once    PROPRANOLOL (INDERAL) 20 MG TABLET    Take one tablet by mouth three times daily as needed for anxiety.    QUETIAPINE (SEROQUEL) 100 MG TABLET    Take 1.5 tablets by mouth nightly    TRETINOIN (RETIN-A) 0.025 % CREAM    Apply topically every other night       ALLERGIES     Patient has no known allergies.    FAMILY HISTORY       Family History   Problem Relation Age of Onset    High Blood Pressure Mother     Arthritis Mother     Diabetes Mother     Stroke Mother     Arthritis Father     High Blood Pressure Father           SOCIAL HISTORY       Social History     Socioeconomic History    Marital status: Single     Spouse name: None    Number of children: None    Years of education: None    Highest education level: None   Tobacco Use    Smoking status: Never    Smokeless tobacco: Never   Vaping Use    Vaping Use: Every day    Substances: Nicotine   Substance and Sexual Activity    Alcohol use: Yes     Comment: socially 1-2 times per week    Drug use: Yes     Types: Marijuana (Weed)     Social Determinants of Health     Financial Resource Strain: Low Risk  (2/7/2023)    Overall Financial Resource Strain (CARDIA)     Difficulty of Paying Living Expenses: Not hard at all   Transportation Needs: Unknown (2/7/2023)    PRAPARE - Transportation     Lack of Transportation (Non-Medical): No   Housing Stability: Unknown (2/7/2023)    Housing Stability Vital Sign     Unstable Housing in the Last Year: No       PHYSICAL EXAM       ED Triage Vitals [01/09/24 1642]   BP Temp Temp Source Pulse Respirations SpO2 Height Weight - Scale   (!) 150/98 99.3 °F (37.4 °C) Oral 99 18 97 % 1.626 m (5' 4\") 72.6 kg (160 lb)       Physical Exam  General appearance: Patient is awake alert interactive appropriate nontoxic in no acute distress  Head is atraumatic normocephalic  Eyes pupils are equal and reactive sclera white conjunctive are pink  Oral

## 2024-01-30 ENCOUNTER — OFFICE VISIT (OUTPATIENT)
Dept: ORTHOPEDIC SURGERY | Age: 34
End: 2024-01-30
Payer: MEDICAID

## 2024-01-30 VITALS
HEART RATE: 85 BPM | BODY MASS INDEX: 31.15 KG/M2 | DIASTOLIC BLOOD PRESSURE: 87 MMHG | TEMPERATURE: 98 F | WEIGHT: 165 LBS | SYSTOLIC BLOOD PRESSURE: 119 MMHG | HEIGHT: 61 IN | OXYGEN SATURATION: 97 %

## 2024-01-30 DIAGNOSIS — S66.819A STRAIN OF FLEXOR DIGITORUM PROFUNDUS TENDON: Primary | ICD-10-CM

## 2024-01-30 DIAGNOSIS — M54.16 LUMBAR RADICULOPATHY: ICD-10-CM

## 2024-01-30 PROCEDURE — 1036F TOBACCO NON-USER: CPT | Performed by: PHYSICIAN ASSISTANT

## 2024-01-30 PROCEDURE — 99213 OFFICE O/P EST LOW 20 MIN: CPT | Performed by: PHYSICIAN ASSISTANT

## 2024-01-30 PROCEDURE — G8484 FLU IMMUNIZE NO ADMIN: HCPCS | Performed by: PHYSICIAN ASSISTANT

## 2024-01-30 PROCEDURE — G8417 CALC BMI ABV UP PARAM F/U: HCPCS | Performed by: PHYSICIAN ASSISTANT

## 2024-01-30 PROCEDURE — G8427 DOCREV CUR MEDS BY ELIG CLIN: HCPCS | Performed by: PHYSICIAN ASSISTANT

## 2024-01-30 RX ORDER — TRAMADOL HYDROCHLORIDE 50 MG/1
50 TABLET ORAL EVERY 6 HOURS PRN
Qty: 28 TABLET | Refills: 0 | Status: SHIPPED | OUTPATIENT
Start: 2024-01-30 | End: 2024-02-06

## 2024-01-30 RX ORDER — DULOXETIN HYDROCHLORIDE 60 MG/1
60 CAPSULE, DELAYED RELEASE ORAL DAILY
COMMUNITY

## 2024-01-30 NOTE — PROGRESS NOTES
tablet by mouth every 6 hours as needed for Pain (Patient not taking: Reported on 1/30/2024)       No current facility-administered medications on file prior to visit.       Objective:   /87 (Site: Right Upper Arm, Position: Sitting, Cuff Size: Medium Adult)   Pulse 85   Temp 98 °F (36.7 °C) (Temporal)   Ht 1.549 m (5' 1\")   Wt 74.8 kg (165 lb)   SpO2 97%   BMI 31.18 kg/m²       Radiographs and Laboratory Studies:   Previous diagnostic imaging studies were reviewed.   HISTORY:  ORDERING SYSTEM PROVIDED HISTORY: Lumbar radiculopathy, Chronic SI joint  pain, Chronic SI joint pain  TECHNOLOGIST PROVIDED HISTORY:  Reason for exam:->sciatica left side, therapy failed to alleviate  What is the sedation requirement?->None  What reading provider will be dictating this exam?->CRC     FINDINGS:  BONES/ALIGNMENT: There is normal alignment of the spine. The vertebral body  heights are maintained. The bone marrow signal appears unremarkable.     SPINAL CORD: The conus terminates normally.     SOFT TISSUES: No paraspinal mass identified.     L1-L2: There is no significant disc herniation, spinal canal stenosis or  neural foraminal narrowing.     L2-L3: There is no significant disc herniation, spinal canal stenosis or  neural foraminal narrowing.     L3-L4: There is no significant disc herniation, spinal canal stenosis or  neural foraminal narrowing.     L4-L5: Mild loss of disc signal.  Grade 1 retrolisthesis with disc bulging.  Mild bilateral facet arthropathy.  Mild bilateral neural foraminal narrowing.     L5-S1: Mild loss of disc signal.  Grade 1 retrolisthesis with disc bulging  and 6 mm left paracentral disc protrusion effaces the anterior thecal sac.  The protruded disc severely narrows left subarticular recess impinging and  descending left S1 nerve root.  Moderate bilateral set arthropathy.  Findings  result in moderate bilateral neural foraminal narrowing.     IMPRESSION:  At L4-L5, grade 1 retrolisthesis

## 2024-02-06 ENCOUNTER — HOSPITAL ENCOUNTER (OUTPATIENT)
Dept: MRI IMAGING | Age: 34
Discharge: HOME | End: 2024-02-08
Payer: MEDICAID

## 2024-02-06 DIAGNOSIS — M54.16 LUMBAR RADICULOPATHY: ICD-10-CM

## 2024-02-06 PROCEDURE — 72148 MRI LUMBAR SPINE W/O DYE: CPT

## 2024-02-20 DIAGNOSIS — M54.16 LUMBAR RADICULOPATHY: ICD-10-CM

## 2024-02-20 DIAGNOSIS — M54.16 LUMBAR RADICULOPATHY: Primary | ICD-10-CM

## 2024-02-20 RX ORDER — LIDOCAINE 4 G/G
1 PATCH TOPICAL DAILY
Qty: 30 PATCH | Refills: 0 | Status: SHIPPED | OUTPATIENT
Start: 2024-02-20 | End: 2024-03-21

## 2024-02-22 RX ORDER — TRAMADOL HYDROCHLORIDE 50 MG/1
50 TABLET ORAL EVERY 4 HOURS PRN
Qty: 18 TABLET | Refills: 0 | Status: SHIPPED | OUTPATIENT
Start: 2024-02-22 | End: 2024-02-25

## 2024-02-22 RX ORDER — TRAMADOL HYDROCHLORIDE 50 MG/1
TABLET ORAL
Qty: 28 TABLET | Refills: 0 | OUTPATIENT
Start: 2024-02-22

## 2024-02-22 NOTE — PROGRESS NOTES
Patient still came in for an appointment that was made for Dr. Phillips when he was on the office.  She states that her radiculopathy and pain are getting worse and more persistent.  We discussed that the best and that we can do right now is get her in with Dr. Castillo as soon as possible discussed modalities treatment.  Until then I will give her lidocaine patches as well as refill her tramadol but I will not refill this again.

## 2024-02-26 ENCOUNTER — OFFICE VISIT (OUTPATIENT)
Dept: ORTHOPEDIC SURGERY | Age: 34
End: 2024-02-26
Payer: MEDICAID

## 2024-02-26 ENCOUNTER — HOSPITAL ENCOUNTER (OUTPATIENT)
Dept: ORTHOPEDIC SURGERY | Age: 34
Discharge: HOME | End: 2024-02-28
Payer: MEDICAID

## 2024-02-26 VITALS — BODY MASS INDEX: 30.4 KG/M2 | WEIGHT: 161 LBS | HEIGHT: 61 IN

## 2024-02-26 DIAGNOSIS — M51.27 HERNIATION OF INTERVERTEBRAL DISC BETWEEN L5 AND S1: Primary | ICD-10-CM

## 2024-02-26 DIAGNOSIS — R52 PAIN: ICD-10-CM

## 2024-02-26 PROCEDURE — G8417 CALC BMI ABV UP PARAM F/U: HCPCS | Performed by: ORTHOPAEDIC SURGERY

## 2024-02-26 PROCEDURE — 1036F TOBACCO NON-USER: CPT | Performed by: ORTHOPAEDIC SURGERY

## 2024-02-26 PROCEDURE — 72110 X-RAY EXAM L-2 SPINE 4/>VWS: CPT

## 2024-02-26 PROCEDURE — 99204 OFFICE O/P NEW MOD 45 MIN: CPT | Performed by: ORTHOPAEDIC SURGERY

## 2024-02-26 PROCEDURE — G8427 DOCREV CUR MEDS BY ELIG CLIN: HCPCS | Performed by: ORTHOPAEDIC SURGERY

## 2024-02-26 PROCEDURE — G8484 FLU IMMUNIZE NO ADMIN: HCPCS | Performed by: ORTHOPAEDIC SURGERY

## 2024-02-26 NOTE — PROGRESS NOTES
Subjective:      Patient ID: Gina Kendrick is a 33 y.o. female who presents today for:  Chief Complaint   Patient presents with    New Patient     Patient presents to clinic for evaluation of the lumbar spine. She describes constant pain in her tailbone and buttocks. She states the pain radiates down the posterior-lateral aspect of both legs when walking, left worse than right. She also describes numbness/tingling. Patient states this pain has been flared up for the last 3-4 months. An MRI was done 2/6/24. Patient has tried 4 months of physical therapy without relief of symptoms.       Subjective/Objective/Assessment/Plan:     SUBJECTIVE -the patient presents with low back pain and left-sided radicular complaints down the back of her thigh to her foot.  She does not know when this began.  She thinks that has happened for quite some time.    OBJECTIVE - The patient can rise up on their toes and rise up on her heels.  5 out of 5 hip flexion and knee extension strength bilaterally.  Sensation intact bilaterally in the lower extremities from L2-S1.      XR LUMBAR SPINE (MIN 4 VIEWS)  4 views of the lumbar spine.  Degenerative disc disease at L5-S1.  No   spondylolisthesis.  No scoliosis.      ASSESSMENT -    Diagnosis Orders   1. Herniation of intervertebral disc between L5 and S1  Ambulatory referral to Physical Therapy          PLAN -I talked extensively about the nonoperative and operative treatment interventions.  We discussed anti-inflammatories.  She is smoking marijuana and using a vape pen.  She is taking baclofen.  She would like to get pregnant.  I talked about a caudal epidural steroid injection versus laminectomy and discectomy on the left side.  She does not report any foot drop or bowel or bladder complaints consistent with a cauda equina syndrome.  She will think about the information that I have given her.  She will let us know how she wants to proceed.       Surgery Phone: [phone]   [facility]

## 2024-02-27 ENCOUNTER — OFFICE VISIT (OUTPATIENT)
Dept: FAMILY MEDICINE CLINIC | Age: 34
End: 2024-02-27
Payer: MEDICAID

## 2024-02-27 VITALS
OXYGEN SATURATION: 98 % | WEIGHT: 172.2 LBS | BODY MASS INDEX: 30.51 KG/M2 | SYSTOLIC BLOOD PRESSURE: 116 MMHG | DIASTOLIC BLOOD PRESSURE: 74 MMHG | HEIGHT: 63 IN | TEMPERATURE: 98.2 F | HEART RATE: 85 BPM

## 2024-02-27 DIAGNOSIS — L30.9 ECZEMA, UNSPECIFIED TYPE: ICD-10-CM

## 2024-02-27 DIAGNOSIS — J02.0 STREP THROAT: Primary | ICD-10-CM

## 2024-02-27 DIAGNOSIS — J02.9 SORE THROAT: ICD-10-CM

## 2024-02-27 DIAGNOSIS — H92.03 OTALGIA OF BOTH EARS: ICD-10-CM

## 2024-02-27 LAB — S PYO AG THROAT QL: POSITIVE

## 2024-02-27 PROCEDURE — G8417 CALC BMI ABV UP PARAM F/U: HCPCS | Performed by: NURSE PRACTITIONER

## 2024-02-27 PROCEDURE — G8484 FLU IMMUNIZE NO ADMIN: HCPCS | Performed by: NURSE PRACTITIONER

## 2024-02-27 PROCEDURE — G8427 DOCREV CUR MEDS BY ELIG CLIN: HCPCS | Performed by: NURSE PRACTITIONER

## 2024-02-27 PROCEDURE — 99213 OFFICE O/P EST LOW 20 MIN: CPT | Performed by: NURSE PRACTITIONER

## 2024-02-27 PROCEDURE — 1036F TOBACCO NON-USER: CPT | Performed by: NURSE PRACTITIONER

## 2024-02-27 RX ORDER — TRAMADOL HYDROCHLORIDE 50 MG/1
50 TABLET ORAL EVERY 6 HOURS PRN
COMMUNITY

## 2024-02-27 RX ORDER — AMOXICILLIN 500 MG/1
500 CAPSULE ORAL 2 TIMES DAILY
Qty: 20 CAPSULE | Refills: 0 | Status: SHIPPED | OUTPATIENT
Start: 2024-02-27 | End: 2024-03-08

## 2024-02-27 RX ORDER — QUETIAPINE 150 MG/1
150 TABLET, FILM COATED, EXTENDED RELEASE ORAL NIGHTLY
COMMUNITY
Start: 2023-12-23

## 2024-02-27 RX ORDER — GABAPENTIN 600 MG/1
600 TABLET ORAL 2 TIMES DAILY
COMMUNITY
Start: 2024-01-22

## 2024-02-27 SDOH — ECONOMIC STABILITY: FOOD INSECURITY: WITHIN THE PAST 12 MONTHS, YOU WORRIED THAT YOUR FOOD WOULD RUN OUT BEFORE YOU GOT MONEY TO BUY MORE.: NEVER TRUE

## 2024-02-27 SDOH — ECONOMIC STABILITY: FOOD INSECURITY: WITHIN THE PAST 12 MONTHS, THE FOOD YOU BOUGHT JUST DIDN'T LAST AND YOU DIDN'T HAVE MONEY TO GET MORE.: NEVER TRUE

## 2024-02-27 SDOH — ECONOMIC STABILITY: INCOME INSECURITY: HOW HARD IS IT FOR YOU TO PAY FOR THE VERY BASICS LIKE FOOD, HOUSING, MEDICAL CARE, AND HEATING?: NOT HARD AT ALL

## 2024-02-27 ASSESSMENT — PATIENT HEALTH QUESTIONNAIRE - PHQ9
SUM OF ALL RESPONSES TO PHQ QUESTIONS 1-9: 0
6. FEELING BAD ABOUT YOURSELF - OR THAT YOU ARE A FAILURE OR HAVE LET YOURSELF OR YOUR FAMILY DOWN: 0
2. FEELING DOWN, DEPRESSED OR HOPELESS: 0
4. FEELING TIRED OR HAVING LITTLE ENERGY: 0
SUM OF ALL RESPONSES TO PHQ QUESTIONS 1-9: 0
8. MOVING OR SPEAKING SO SLOWLY THAT OTHER PEOPLE COULD HAVE NOTICED. OR THE OPPOSITE, BEING SO FIGETY OR RESTLESS THAT YOU HAVE BEEN MOVING AROUND A LOT MORE THAN USUAL: 0
SUM OF ALL RESPONSES TO PHQ QUESTIONS 1-9: 0
9. THOUGHTS THAT YOU WOULD BE BETTER OFF DEAD, OR OF HURTING YOURSELF: 0
SUM OF ALL RESPONSES TO PHQ QUESTIONS 1-9: 0
7. TROUBLE CONCENTRATING ON THINGS, SUCH AS READING THE NEWSPAPER OR WATCHING TELEVISION: 0
10. IF YOU CHECKED OFF ANY PROBLEMS, HOW DIFFICULT HAVE THESE PROBLEMS MADE IT FOR YOU TO DO YOUR WORK, TAKE CARE OF THINGS AT HOME, OR GET ALONG WITH OTHER PEOPLE: 0
5. POOR APPETITE OR OVEREATING: 0
3. TROUBLE FALLING OR STAYING ASLEEP: 0
SUM OF ALL RESPONSES TO PHQ9 QUESTIONS 1 & 2: 0
1. LITTLE INTEREST OR PLEASURE IN DOING THINGS: 0

## 2024-02-27 ASSESSMENT — ENCOUNTER SYMPTOMS
DIARRHEA: 0
SHORTNESS OF BREATH: 0
WHEEZING: 0
TROUBLE SWALLOWING: 0
SORE THROAT: 1
NAUSEA: 0
COUGH: 0
VOMITING: 0

## 2024-02-27 NOTE — PROGRESS NOTES
Gina Kendrick (:  1990) is a 33 y.o. female, Established patient, here for evaluation of the following chief complaint(s):  Congestion (Sore throat, ear pain, woke up with it this morning, has eczema on Rt eye lid that is painful)      Vitals:    24 1203   BP: 116/74   Pulse: 85   Temp: 98.2 °F (36.8 °C)   SpO2: 98%       ASSESSMENT/PLAN:  1. Strep throat  -     amoxicillin (AMOXIL) 500 MG capsule; Take 1 capsule by mouth 2 times daily for 10 days, Disp-20 capsule, R-0Normal  2. Otalgia of both ears        -    advised OTC Flonase/antihistamine, tylenol or motrin  3. Eczema, unspecified type  -     triamcinolone acetonide (KENALOG) injection 80 mg; 80 mg, IntraMUSCular, ONCE, 1 dose, On 24 at 1315Shake well before use. NOT for IV use.  4. Sore throat  -     POCT rapid strep A - POS          No follow-ups on file.      SUBJECTIVE/OBJECTIVE:    Pt also c/o itchy rash on the right eyelid.  States hx of eczema and has been using very cortisone cream without relief.    Pharyngitis  This is a new problem. Episode onset: woke up today with sore throat and ear pain.  Daughter strep + The problem occurs constantly. The problem has been gradually worsening. Associated symptoms include a rash (right eyelid) and a sore throat. Pertinent negatives include no chest pain, chills, coughing, fatigue, fever, headaches, nausea or vomiting. Associated symptoms comments: Bilateral ear pain. The symptoms are aggravated by swallowing. She has tried acetaminophen for the symptoms. The treatment provided no relief.         Review of Systems   Constitutional:  Negative for chills, fatigue and fever.   HENT:  Positive for ear pain and sore throat. Negative for trouble swallowing.    Respiratory:  Negative for cough, shortness of breath and wheezing.    Cardiovascular:  Negative for chest pain and palpitations.   Gastrointestinal:  Negative for diarrhea, nausea and vomiting.   Skin:  Positive for rash (right eyelid).

## 2024-03-07 ENCOUNTER — TELEPHONE (OUTPATIENT)
Dept: ORTHOPEDIC SURGERY | Age: 34
End: 2024-03-07

## 2024-03-07 DIAGNOSIS — M54.16 LUMBAR RADICULOPATHY: Primary | ICD-10-CM

## 2024-03-07 NOTE — TELEPHONE ENCOUNTER
Insurance is requiring at least 4 weeks of physical therapy to approval the caudal epidural injection. I contacted patient and she [place] so I informed her she will need a new PT order. Order was placed and patient understands she will need to do PT so we can re-submit to insurance for approval.

## 2024-03-25 ENCOUNTER — APPOINTMENT (OUTPATIENT)
Dept: CT IMAGING | Age: 34
End: 2024-03-25
Payer: MEDICAID

## 2024-03-25 ENCOUNTER — HOSPITAL ENCOUNTER (EMERGENCY)
Age: 34
Discharge: HOME | End: 2024-03-25
Attending: EMERGENCY MEDICINE
Payer: MEDICAID

## 2024-03-25 VITALS
SYSTOLIC BLOOD PRESSURE: 114 MMHG | OXYGEN SATURATION: 95 % | HEIGHT: 61 IN | DIASTOLIC BLOOD PRESSURE: 62 MMHG | TEMPERATURE: 98.2 F | BODY MASS INDEX: 31.72 KG/M2 | HEART RATE: 90 BPM | RESPIRATION RATE: 18 BRPM | WEIGHT: 168 LBS

## 2024-03-25 DIAGNOSIS — N30.01 ACUTE CYSTITIS WITH HEMATURIA: ICD-10-CM

## 2024-03-25 DIAGNOSIS — R79.89 ABNORMAL LFTS: ICD-10-CM

## 2024-03-25 DIAGNOSIS — R10.9 FLANK PAIN: Primary | ICD-10-CM

## 2024-03-25 DIAGNOSIS — R11.0 NAUSEA: ICD-10-CM

## 2024-03-25 LAB
ALBUMIN SERPL-MCNC: 4.2 G/DL (ref 3.5–4.6)
ALP SERPL-CCNC: 138 U/L (ref 40–130)
ALT SERPL-CCNC: 301 U/L (ref 0–33)
AMYLASE SERPL-CCNC: 24 U/L (ref 22–93)
ANION GAP SERPL CALCULATED.3IONS-SCNC: 10 MEQ/L (ref 9–15)
AST SERPL-CCNC: 409 U/L (ref 0–35)
BACTERIA URNS QL MICRO: ABNORMAL /HPF
BASOPHILS # BLD: 0 K/UL (ref 0–0.1)
BASOPHILS NFR BLD: 0.5 % (ref 0.1–1.2)
BILIRUB SERPL-MCNC: 0.5 MG/DL (ref 0.2–0.7)
BILIRUB UR QL STRIP: ABNORMAL
BUN SERPL-MCNC: 9 MG/DL (ref 6–20)
CALCIUM SERPL-MCNC: 8.8 MG/DL (ref 8.5–9.9)
CHLORIDE SERPL-SCNC: 99 MEQ/L (ref 95–107)
CLARITY UR: ABNORMAL
CO2 SERPL-SCNC: 27 MEQ/L (ref 20–31)
COLOR UR: ABNORMAL
CREAT SERPL-MCNC: 0.71 MG/DL (ref 0.5–0.9)
EOSINOPHIL # BLD: 0.1 K/UL (ref 0–0.4)
EOSINOPHIL NFR BLD: 2.6 % (ref 0.7–5.8)
EPI CELLS #/AREA URNS HPF: ABNORMAL /HPF
ERYTHROCYTE [DISTWIDTH] IN BLOOD BY AUTOMATED COUNT: 12.3 % (ref 11.7–14.4)
ETHANOL PERCENT: NORMAL G/DL
ETHANOLAMINE SERPL-MCNC: <10 MG/DL (ref 0–0.08)
GLOBULIN SER CALC-MCNC: 3.2 G/DL (ref 2.3–3.5)
GLUCOSE SERPL-MCNC: 107 MG/DL (ref 70–99)
GLUCOSE UR STRIP-MCNC: NEGATIVE MG/DL
HCG UR QL: NEGATIVE
HCT VFR BLD AUTO: 41.2 % (ref 37–47)
HGB BLD-MCNC: 14 G/DL (ref 11.2–15.7)
HGB UR QL STRIP: ABNORMAL
IMM GRANULOCYTES # BLD: 0 K/UL
IMM GRANULOCYTES NFR BLD: 0.3 %
INR PPP: 1.1
KETONES UR STRIP-MCNC: NEGATIVE MG/DL
LACTATE BLDV-SCNC: 1.5 MMOL/L (ref 0.5–2.2)
LEUKOCYTE ESTERASE UR QL STRIP: NEGATIVE
LIPASE SERPL-CCNC: 18 U/L (ref 12–95)
LYMPHOCYTES # BLD: 0.7 K/UL (ref 1.2–3.7)
LYMPHOCYTES NFR BLD: 17 %
MAGNESIUM SERPL-MCNC: 2 MG/DL (ref 1.7–2.4)
MCH RBC QN AUTO: 32 PG (ref 25.6–32.2)
MCHC RBC AUTO-ENTMCNC: 34 % (ref 32.2–35.5)
MCV RBC AUTO: 94.1 FL (ref 79.4–94.8)
MONOCYTES # BLD: 0.5 K/UL (ref 0.2–0.9)
MONOCYTES NFR BLD: 11.7 % (ref 4.7–12.5)
NEUTROPHILS # BLD: 2.6 K/UL (ref 1.6–6.1)
NEUTS SEG NFR BLD: 67.9 % (ref 34–71.1)
NITRITE UR QL STRIP: NEGATIVE
PH UR STRIP: 6 [PH] (ref 5–9)
PLATELET # BLD AUTO: 208 K/UL (ref 182–369)
PLATELET BLD QL SMEAR: ADEQUATE
POTASSIUM SERPL-SCNC: 3.4 MEQ/L (ref 3.4–4.9)
PROT SERPL-MCNC: 7.4 G/DL (ref 6.3–8)
PROT UR STRIP-MCNC: 100 MG/DL
PROTHROMBIN TIME: 14.1 SEC (ref 12.3–14.9)
RBC # BLD AUTO: 4.38 M/UL (ref 3.93–5.22)
RBC #/AREA URNS HPF: ABNORMAL /HPF (ref 0–2)
SLIDE REVIEW: ABNORMAL
SODIUM SERPL-SCNC: 136 MEQ/L (ref 135–144)
SP GR UR STRIP: >=1.03 (ref 1–1.03)
URINE REFLEX TO CULTURE: ABNORMAL
UROBILINOGEN UR STRIP-ACNC: 2 E.U./DL
WBC # BLD AUTO: 3.8 K/UL (ref 4–10)
WBC #/AREA URNS HPF: ABNORMAL /HPF (ref 0–5)

## 2024-03-25 PROCEDURE — 36415 COLL VENOUS BLD VENIPUNCTURE: CPT

## 2024-03-25 PROCEDURE — 96361 HYDRATE IV INFUSION ADD-ON: CPT

## 2024-03-25 PROCEDURE — 2580000003 HC RX 258: Performed by: EMERGENCY MEDICINE

## 2024-03-25 PROCEDURE — 96374 THER/PROPH/DIAG INJ IV PUSH: CPT

## 2024-03-25 PROCEDURE — 85025 COMPLETE CBC W/AUTO DIFF WBC: CPT

## 2024-03-25 PROCEDURE — 83605 ASSAY OF LACTIC ACID: CPT

## 2024-03-25 PROCEDURE — 81001 URINALYSIS AUTO W/SCOPE: CPT

## 2024-03-25 PROCEDURE — 85610 PROTHROMBIN TIME: CPT

## 2024-03-25 PROCEDURE — 82150 ASSAY OF AMYLASE: CPT

## 2024-03-25 PROCEDURE — 83735 ASSAY OF MAGNESIUM: CPT

## 2024-03-25 PROCEDURE — 80053 COMPREHEN METABOLIC PANEL: CPT

## 2024-03-25 PROCEDURE — 83690 ASSAY OF LIPASE: CPT

## 2024-03-25 PROCEDURE — 6360000002 HC RX W HCPCS: Performed by: EMERGENCY MEDICINE

## 2024-03-25 PROCEDURE — 74177 CT ABD & PELVIS W/CONTRAST: CPT

## 2024-03-25 PROCEDURE — 99285 EMERGENCY DEPT VISIT HI MDM: CPT

## 2024-03-25 PROCEDURE — 96375 TX/PRO/DX INJ NEW DRUG ADDON: CPT

## 2024-03-25 PROCEDURE — 82077 ASSAY SPEC XCP UR&BREATH IA: CPT

## 2024-03-25 PROCEDURE — 84703 CHORIONIC GONADOTROPIN ASSAY: CPT

## 2024-03-25 PROCEDURE — 6360000004 HC RX CONTRAST MEDICATION: Performed by: EMERGENCY MEDICINE

## 2024-03-25 RX ORDER — SODIUM CHLORIDE 9 MG/ML
INJECTION, SOLUTION INTRAVENOUS CONTINUOUS
Status: DISCONTINUED | OUTPATIENT
Start: 2024-03-25 | End: 2024-03-25 | Stop reason: HOSPADM

## 2024-03-25 RX ORDER — METOCLOPRAMIDE HYDROCHLORIDE 5 MG/ML
10 INJECTION INTRAMUSCULAR; INTRAVENOUS ONCE
Status: COMPLETED | OUTPATIENT
Start: 2024-03-25 | End: 2024-03-25

## 2024-03-25 RX ORDER — NITROFURANTOIN 25; 75 MG/1; MG/1
100 CAPSULE ORAL 2 TIMES DAILY
Qty: 20 CAPSULE | Refills: 0 | Status: SHIPPED | OUTPATIENT
Start: 2024-03-25 | End: 2024-04-04

## 2024-03-25 RX ORDER — KETOROLAC TROMETHAMINE 30 MG/ML
30 INJECTION, SOLUTION INTRAMUSCULAR; INTRAVENOUS ONCE
Status: COMPLETED | OUTPATIENT
Start: 2024-03-25 | End: 2024-03-25

## 2024-03-25 RX ORDER — DIPHENHYDRAMINE HYDROCHLORIDE 50 MG/ML
50 INJECTION INTRAMUSCULAR; INTRAVENOUS ONCE
Status: DISCONTINUED | OUTPATIENT
Start: 2024-03-25 | End: 2024-03-25 | Stop reason: HOSPADM

## 2024-03-25 RX ORDER — ONDANSETRON 4 MG/1
4 TABLET, ORALLY DISINTEGRATING ORAL 3 TIMES DAILY PRN
Qty: 21 TABLET | Refills: 0 | Status: SHIPPED | OUTPATIENT
Start: 2024-03-25

## 2024-03-25 RX ORDER — 0.9 % SODIUM CHLORIDE 0.9 %
1000 INTRAVENOUS SOLUTION INTRAVENOUS ONCE
Status: COMPLETED | OUTPATIENT
Start: 2024-03-25 | End: 2024-03-25

## 2024-03-25 RX ADMIN — KETOROLAC TROMETHAMINE 30 MG: 30 INJECTION, SOLUTION INTRAMUSCULAR at 12:08

## 2024-03-25 RX ADMIN — IOPAMIDOL 75 ML: 755 INJECTION, SOLUTION INTRAVENOUS at 12:34

## 2024-03-25 RX ADMIN — METOCLOPRAMIDE HYDROCHLORIDE 10 MG: 5 INJECTION INTRAMUSCULAR; INTRAVENOUS at 12:08

## 2024-03-25 RX ADMIN — SODIUM CHLORIDE 1000 ML: 9 INJECTION, SOLUTION INTRAVENOUS at 12:06

## 2024-03-25 ASSESSMENT — PAIN SCALES - GENERAL: PAINLEVEL_OUTOF10: 7

## 2024-03-25 ASSESSMENT — ENCOUNTER SYMPTOMS
TROUBLE SWALLOWING: 0
CHEST TIGHTNESS: 0
STRIDOR: 0
VOMITING: 1
FACIAL SWELLING: 0
NAUSEA: 1
SHORTNESS OF BREATH: 0
EYE PAIN: 0
SORE THROAT: 0
COUGH: 0
DIARRHEA: 0
ABDOMINAL PAIN: 0
VOICE CHANGE: 0
BLOOD IN STOOL: 0
BACK PAIN: 0
WHEEZING: 0
EYE REDNESS: 0
CHOKING: 0
EYE DISCHARGE: 0
CONSTIPATION: 0
SINUS PRESSURE: 0

## 2024-03-25 ASSESSMENT — LIFESTYLE VARIABLES
HOW MANY STANDARD DRINKS CONTAINING ALCOHOL DO YOU HAVE ON A TYPICAL DAY: 3 OR 4
HOW OFTEN DO YOU HAVE A DRINK CONTAINING ALCOHOL: 4 OR MORE TIMES A WEEK

## 2024-03-25 ASSESSMENT — PAIN - FUNCTIONAL ASSESSMENT: PAIN_FUNCTIONAL_ASSESSMENT: 0-10

## 2024-03-25 NOTE — ED PROVIDER NOTES
[facility] ED  eMERGENCY dEPARTMENT eNCOUnter      Pt Name: Gina Kendrick  MRN: [id number]  Birthdate 1990  Date of evaluation: 3/25/2024  Provider: Landon López MD    CHIEF COMPLAINT       Chief Complaint   Patient presents with    Flank Pain    Abdominal Pain     Bilateral kidney pain, with right being worse x 4 days. Hx of stones and pancreatitis. Patient feels she has pancreatitis due to alcohol. Also states that she has had a stomach bug.         HISTORY OF PRESENT ILLNESS   (Location/Symptom, Timing/Onset,Context/Setting, Quality, Duration, Modifying Factors, Severity)  Note limiting factors.   Gina Kendrick is a 33 y.o. female who presents to the emergency department patient comes emergency because she is concerned about her pancreas as well as kidneys as she has a history of kidney stone or pancreatitis when she was in [place] area several years ago patient admitted she is a wine drinker she drinks a bottle of wine patient has a several episode of nausea vomiting yesterday and has been bilateral kidney pain she describes as a back pain no numbness tingling to the legs no bladder or bowel dysfunction denies seeing any blood in the urine no documented fever she is  1 para 1 she has a placement of IUD though no last menstrual period    HPI    NursingNotes were reviewed.    REVIEW OF SYSTEMS    (2-9 systems for level 4, 10 or more for level 5)     Review of Systems   Constitutional:  Positive for activity change, appetite change and fatigue. Negative for fever.   HENT:  Negative for congestion, drooling, facial swelling, mouth sores, nosebleeds, sinus pressure, sore throat, trouble swallowing and voice change.    Eyes:  Negative for pain, discharge, redness and visual disturbance.   Respiratory:  Negative for cough, choking, chest tightness, shortness of breath, wheezing and stridor.    Cardiovascular:  Negative for chest pain, palpitations and leg swelling.   Gastrointestinal:

## 2024-03-26 ENCOUNTER — HOSPITAL ENCOUNTER (OUTPATIENT)
Dept: PHYSICAL THERAPY | Age: 34
Setting detail: THERAPIES SERIES
Discharge: HOME | End: 2024-03-26
Attending: ORTHOPAEDIC SURGERY
Payer: MEDICAID

## 2024-03-26 PROCEDURE — 97110 THERAPEUTIC EXERCISES: CPT

## 2024-03-26 PROCEDURE — 97530 THERAPEUTIC ACTIVITIES: CPT

## 2024-03-26 PROCEDURE — 97162 PT EVAL MOD COMPLEX 30 MIN: CPT

## 2024-03-26 ASSESSMENT — PAIN SCALES - GENERAL: PAINLEVEL_OUTOF10: 5

## 2024-03-26 ASSESSMENT — PAIN DESCRIPTION - LOCATION: LOCATION: BACK

## 2024-03-26 ASSESSMENT — PAIN DESCRIPTION - PAIN TYPE: TYPE: CHRONIC PAIN

## 2024-03-26 ASSESSMENT — PAIN DESCRIPTION - ORIENTATION: ORIENTATION: RIGHT;LEFT

## 2024-03-26 NOTE — PROGRESS NOTES
(stumbling when feeling \"shooting pains down LEs')  Unrelenting pain at night: No    Observations:   Mild reduction of lumbar lordosis in standing,     Palpation:   Lumbar Spine Palpation: Mod tenderness and increased tone bilat LS junction, and bilat piriformis/ glut musclulature    Ambulation/Gait (if applicable):   Pt amb 120 feet I demonstrating slow pace, wide Jaime, decreased heel strike R and L, mild decreased stance R LE compared to L     Left AROM  Right AROM       WFL     WFL          Left Strength  Right Strength         Strength LLE  L Hip Flexion: 4-/5  L Hip Extension: 3+/5, 4-/5  L Hip ABduction: 4-/5  L Hip Internal Rotation: 4-/5  L Hip External Rotation: 4-/5  L Knee Flexion: 4-/5  L Knee Extension: 4-/5, 4/5  L Ankle Dorsiflexion: 3+/5, 4-/5    Strength RLE  R Hip Flexion: 4-/5  R Hip Extension: 4-/5, 3+/5  R Hip ABduction: 4-/5  R Hip Internal Rotation: 4-/5  R Hip External Rotation: 4-/5  R Knee Flexion: 4-/5, 4/5  R Knee Extension: 4/5, 4-/5  R Ankle Dorsiflexion: 3+/5, 4-/5       Lumbar Assessment   AROM Lumbar Spine   Lumbar Spine AROM : Painful  Measured as: % of normal  Flexion: 75% with increased back and bilat LE pain  Extension: less marcial 25% with increased pain and guarding  Lateral Flexion Right: 50%  Lateral Flexion Left: 50%  Right Rotation: 75%  Left Rotation: 75%       Trunk Strength     Trunk Strength  Upper abdominals: Fair  Lower abdominals: Poor     Muscle Length/Flexibility:    Supine 90/90 hamstring flexibility: R and L LE lacking 30 deg knee ext         Special Tests:   Special Tests Lumbar Spine  Lumbar Special Tests:  (Pos PA testing central and unilat L3-L5 and pos sacral PA testing)  JAKOB Test: R (-), L (-)  SLR: R (-), L (-)  Crossed SLR: L (-), R (-)  Pelvic Compression: (-)  Oswestry= 38/50=76%     ASSESSMENT     Impression: Assessment: 33 year old female iwth history of chronic back pain progressivelly worsening over the last year  presents to PT with diagnosis of

## 2024-04-02 ENCOUNTER — HOSPITAL ENCOUNTER (OUTPATIENT)
Dept: PHYSICAL THERAPY | Age: 34
Setting detail: THERAPIES SERIES
Discharge: HOME | End: 2024-04-02
Attending: ORTHOPAEDIC SURGERY
Payer: MEDICAID

## 2024-04-02 PROCEDURE — 97140 MANUAL THERAPY 1/> REGIONS: CPT

## 2024-04-02 PROCEDURE — 97110 THERAPEUTIC EXERCISES: CPT

## 2024-04-02 ASSESSMENT — PAIN DESCRIPTION - LOCATION: LOCATION: BACK

## 2024-04-02 ASSESSMENT — PAIN SCALES - GENERAL
PAINLEVEL_OUTOF10: 6
PAINLEVEL_OUTOF10: 8

## 2024-04-02 ASSESSMENT — PAIN DESCRIPTION - ORIENTATION: ORIENTATION: RIGHT;LEFT

## 2024-04-02 ASSESSMENT — PAIN DESCRIPTION - PAIN TYPE: TYPE: CHRONIC PAIN

## 2024-04-02 ASSESSMENT — PAIN DESCRIPTION - DESCRIPTORS: DESCRIPTORS: SHOOTING;TINGLING

## 2024-04-02 NOTE — PROGRESS NOTES
Physical Therapy: Daily Note   Patient: Gina Kendrick (33 y.o. female)   Examination Date: 2024  Plan of Care/Certification Expiration Date: 24    No data recorded   :  1990 # of Visits since SOC:   2   MRN: [id number]  CSN: [id number] Start of Care Date:   3/26/2024   Insurance: Payor: [employer] [place] MEDICAID / Plan: [facility] MEDICA / Product Type: *No Product type* /   Insurance ID: [id number] - (Medicaid Managed) Secondary Insurance (if applicable):    Referring Physician: Thang Phillips DO Dr. Balk   PCP: Ken Stockton MD Visits to Date/Visits Approved: 2 / 10    No Show/Cancelled Appts:   /       Medical Diagnosis: No admission diagnoses are documented for this encounter.    Treatment Diagnosis: Lumbar radiculopathy        SUBJECTIVE EXAMINATION   Pain Level: Pain Screening  Patient Currently in Pain: Yes  Pain Assessment: 0-10  Pain Level: 8  Best Pain Level: 4  Worst Pain Level: 9  Pain Type: Chronic pain  Pain Location: Back  Pain Orientation: Right, Left  Pain Descriptors: Shooting, Tingling    Patient Comments: Subjective: Pt. 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 She avoided bending on  to avoid furthering her pain. She feels more muscular pain today in t/l column.    HEP Compliance: Good        OBJECTIVE EXAMINATION   Restrictions:  No data recorded No data recorded No data recorded              TREATMENT     Exercises:      Treatment Reasoning    Exercise 2: abd bracing x 10 H 3  Exercise 4: ham stretch 3 x 30sec  Exercise 5: piriformis stretch [id number]   P-ball bounce, CW CCW forward and back/ side to side                        Manual Therapy: (CPT 21088) Treatment Reasoning                            Pt Education: PT Education: Goals, PT Role, Plan of Care, Evaluative findings, Home Exercise Program, Lifting mechanics, Ergonomics, Body mechanics  Additional Comments: Consider

## 2024-04-09 ENCOUNTER — HOSPITAL ENCOUNTER (OUTPATIENT)
Dept: PHYSICAL THERAPY | Age: 34
Setting detail: THERAPIES SERIES
Discharge: HOME | End: 2024-04-09
Attending: ORTHOPAEDIC SURGERY
Payer: MEDICAID

## 2024-04-09 PROCEDURE — 97110 THERAPEUTIC EXERCISES: CPT

## 2024-04-09 PROCEDURE — 97012 MECHANICAL TRACTION THERAPY: CPT

## 2024-04-09 PROCEDURE — 97140 MANUAL THERAPY 1/> REGIONS: CPT

## 2024-04-09 ASSESSMENT — PAIN SCALES - GENERAL: PAINLEVEL_OUTOF10: 7

## 2024-04-09 NOTE — PROGRESS NOTES
Physical Therapy: Daily Note   Patient: Gina Kendrick (33 y.o. female)   Examination Date: 2024  Plan of Care/Certification Expiration Date: 24    No data recorded   :  1990 # of Visits since SOC:   3   MRN: [id number]  CSN: [id number] Start of Care Date:   3/26/2024   Insurance: Payor: [employer] [place] MEDICAID / Plan: [facility] MEDICA / Product Type: *No Product type* /   Insurance ID: [id number] - (Medicaid Managed) Secondary Insurance (if applicable):    Referring Physician: Thang Phillips DO     PCP: Ken Stockton MD Visits to Date/Visits Approved: 3 /      No Show/Cancelled Appts:   /       Medical Diagnosis: No admission diagnoses are documented for this encounter.    Treatment Diagnosis: Lumbar radiculopathy        SUBJECTIVE EXAMINATION   Pain Level: Pain Screening  Patient Currently in Pain: Yes  Pain Assessment: 0-10  Pain Level: 7    Patient Comments: Subjective: Pt states back pain 6-7/10 with pain/numbness in B feet and low back.    HEP Compliance: Fair            TREATMENT     Exercises:      Treatment Reasoning    Exercise 1: prone laying 3 min  Exercise 2: prone prop H3'' x 5 (head neutral to avoid light headedness)  Exercise 6: p-ball bounce, CW CCW  10 each scap retract; H5'' x 10                          Manual Therapy: (CPT 40235) Treatment Reasoning     Soft Tissue Mobilizaton: attempted MFR to lumbar area with inc pain and tenderness and thus focused on thoracic with min tolerance  Other: Donned biofreeze to lumbar area to promote relaxation and inc tolerance with mechanical traction                      Modalities  Mechanical Traction: Lumbar  (CPT 57178) Treatment Reasoning      Prone in neutral  50#/10# x 10 min (dec tolerance) hold 45''/relax 20''  to dec pain. Pain centralized to low back and out of LE's.                        Pt Education: Additional Comments: Consider KT       ASSESSMENT     Assessment: Assessment: Pt with high level of pain but

## 2024-04-15 ENCOUNTER — HOSPITAL ENCOUNTER (OUTPATIENT)
Dept: PHYSICAL THERAPY | Age: 34
Setting detail: THERAPIES SERIES
Discharge: HOME | End: 2024-04-15
Attending: ORTHOPAEDIC SURGERY
Payer: MEDICAID

## 2024-04-15 ENCOUNTER — TELEPHONE (OUTPATIENT)
Dept: ORTHOPEDIC SURGERY | Age: 34
End: 2024-04-15

## 2024-04-15 DIAGNOSIS — M51.27 HERNIATION OF INTERVERTEBRAL DISC BETWEEN L5 AND S1: Primary | ICD-10-CM

## 2024-04-15 PROCEDURE — 97530 THERAPEUTIC ACTIVITIES: CPT

## 2024-04-15 PROCEDURE — G0283 ELEC STIM OTHER THAN WOUND: HCPCS

## 2024-04-15 PROCEDURE — 97110 THERAPEUTIC EXERCISES: CPT

## 2024-04-15 NOTE — PROGRESS NOTES
Physical Therapy: Daily Note   Patient: Gina Kendrick (33 y.o. female)   Examination Date: 04/15/2024  Plan of Care/Certification Expiration Date: 24    No data recorded   :  1990 # of Visits since SOC:   4   MRN: [id number]  CSN: [id number] Start of Care Date:   3/26/2024   Insurance: Payor: [facility] MEDICAID / Plan: [facility] MEDICA / Product Type: *No Product type* /   Insurance ID: [id number] - (Medicaid Managed) Secondary Insurance (if applicable):    Referring Physician: Thang Phillips DO Dr. Balk   PCP: Ken Stockton MD Visits to Date/Visits Approved: 4 / 10    No Show/Cancelled Appts: 0      Medical Diagnosis: No admission diagnoses are documented for this encounter.    Treatment Diagnosis: Lumbar radiculopathy        SUBJECTIVE EXAMINATION   Pain Level: Pain Screening  Patient Currently in Pain: Yes  Pain Assessment: 0-10  Pain Level:  (8.5 in low back)    Patient Comments: Subjective: Pt states back pain 6-7/10 with pain/numbness in B feet and low back. Pt states she has also been having bluurred vision and pain in her neck. Pt states after last tx she iced afterwards and stated it helped. Pt states for a few hours she had some relief in her back but she was driving around for a bit and then once she returned home she felt inc sciatic pain into glutes.    HEP Compliance: Fair            TREATMENT     Exercises:      Treatment Reasoning    Exercise 1: prone laying 3 min  Exercise 2: prone prop H5'' x 5 (head neutral to avoid light headedness)  Exercise 5: piriformis stretch; H30' x 3  Exercise 7: pelvic tilts; H3'' x 10  Exercise 8: pelvic tilt with ball squeeze; H3'' x 10  Exercise 9: pelvic tilt with tiny march x 5 alternating                          Manual Therapy: (CPT 67095) Treatment Reasoning     Other: PROM to B hamstrings to dec tightness and pain.                      Modalities  Electric Stimulation, unattended:  (CPT 52314) /   Treatment Reasoning

## 2024-04-16 RX ORDER — TRAMADOL HYDROCHLORIDE 50 MG/1
50 TABLET ORAL EVERY 4 HOURS PRN
Qty: 30 TABLET | Refills: 0 | Status: SHIPPED | OUTPATIENT
Start: 2024-04-16 | End: 2024-04-23

## 2024-04-22 ENCOUNTER — HOSPITAL ENCOUNTER (OUTPATIENT)
Dept: PHYSICAL THERAPY | Age: 34
Setting detail: THERAPIES SERIES
Discharge: HOME | End: 2024-04-22
Attending: ORTHOPAEDIC SURGERY
Payer: MEDICAID

## 2024-04-22 NOTE — PROGRESS NOTES
Physical Therapy  Daily Treatment Note  Date: 2024  Patient Name: Gina Kendrick  MRN: [id number]     :   1990    Referring Physician: Thang Phillips DO Dr. Balk   PCP: Ken Stockton MD    Medical Diagnosis: No admission diagnoses are documented for this encounter.    Treatment Diagnosis: Lumbar radiculopathy      Insurance: Payor: [facility] MEDICAID / Plan: [facility] MEDICA / Product Type: *No Product type* /   Insurance ID: [id number] - (Medicaid Managed)    Subjective:   General  Referring Provider (secondary): Dr. Phillips  PT Visit Information  Total # of Visits Approved: 10  Total # of Visits to Date: 4  Plan of Care/Certification Expiration Date: 24  No Show: 0  Canceled Appointment: 1  Referring Provider (secondary): Dr. Phillips  Subjective  Subjective: Pt. cancelled pink eye.            Assessment:   Conditions Requiring Skilled Therapeutic Intervention  Body Structures, Functions, Activity Limitations Requiring Skilled Therapeutic Intervention: Decreased functional mobility ;Decreased ROM;Decreased strength;Increased pain;Decreased posture  Treatment Diagnosis: Lumbar radiculopathy          Goals:  Short Term Goals  Time Frame for Short Term Goals: 2 wks  Short Term Goal 1: I wtih HEP and report compliance with HEP 5/7 days per wk  Short Term Goal 2: Pt report any decrease in back and LE pain during ADLs  Short Term Goal 3: Pt report 25% or greater reduction of LE paresthesias  Long Term Goals  Time Frame for Long Term Goals : 4-5 wks  Long Term Goal 1: Improve lumbar spine AROM WFL (flex, bilat SB) and ext 50-75% or greater without reproducing LE radicular sxs  Long Term Goal 2: Improve Oswestry from 76% at time of IE to 35% or less at time of D/C  Long Term Goal 3: Improve core and LE strength any amount to improve ability to perform ADLs  Long Term Goal 4: Pt perform ADLs including housework and selfcare activities with 4/10 pain or less greater than 50% of the

## 2024-04-23 ENCOUNTER — OFFICE VISIT (OUTPATIENT)
Dept: ORTHOPEDIC SURGERY | Age: 34
End: 2024-04-23
Payer: MEDICAID

## 2024-04-23 ENCOUNTER — APPOINTMENT (OUTPATIENT)
Dept: PHYSICAL THERAPY | Age: 34
End: 2024-04-23
Attending: ORTHOPAEDIC SURGERY
Payer: MEDICAID

## 2024-04-23 ENCOUNTER — HOSPITAL ENCOUNTER (OUTPATIENT)
Dept: ORTHOPEDIC SURGERY | Age: 34
Discharge: HOME | End: 2024-04-25
Payer: MEDICAID

## 2024-04-23 VITALS
HEIGHT: 61 IN | BODY MASS INDEX: 31.72 KG/M2 | WEIGHT: 168 LBS | OXYGEN SATURATION: 99 % | HEART RATE: 94 BPM | TEMPERATURE: 97.3 F

## 2024-04-23 DIAGNOSIS — M53.3 CHRONIC SI JOINT PAIN: ICD-10-CM

## 2024-04-23 DIAGNOSIS — R52 PAIN: ICD-10-CM

## 2024-04-23 DIAGNOSIS — R52 PAIN: Primary | ICD-10-CM

## 2024-04-23 DIAGNOSIS — G89.29 CHRONIC SI JOINT PAIN: ICD-10-CM

## 2024-04-23 PROCEDURE — 99214 OFFICE O/P EST MOD 30 MIN: CPT | Performed by: ORTHOPAEDIC SURGERY

## 2024-04-23 PROCEDURE — 72050 X-RAY EXAM NECK SPINE 4/5VWS: CPT

## 2024-04-23 PROCEDURE — G8427 DOCREV CUR MEDS BY ELIG CLIN: HCPCS | Performed by: ORTHOPAEDIC SURGERY

## 2024-04-23 PROCEDURE — 1036F TOBACCO NON-USER: CPT | Performed by: ORTHOPAEDIC SURGERY

## 2024-04-23 PROCEDURE — 72050 X-RAY EXAM NECK SPINE 4/5VWS: CPT | Performed by: ORTHOPAEDIC SURGERY

## 2024-04-23 PROCEDURE — G8417 CALC BMI ABV UP PARAM F/U: HCPCS | Performed by: ORTHOPAEDIC SURGERY

## 2024-04-23 RX ORDER — QUETIAPINE 200 MG/1
200 TABLET, FILM COATED, EXTENDED RELEASE ORAL NIGHTLY
COMMUNITY
Start: 2024-03-19

## 2024-04-23 NOTE — PROGRESS NOTES
is unremarkable    The pancreas show no significant peripancreatic inflammatory stranding or  fluid.  No pancreatic ductal dilatation.    The adrenals, are unremarkable.    The right kidney shows no significant perinephric stranding.  There is a 2 mm  nonobstructing calculi at the mid to lower pole of the right kidney.    Left kidney shows no significant perinephric stranding.  There is a less than  2 mm calculi at the inferior pole left kidney.  No hydronephrosis    Large and small bowel show no signs of obstruction.  The appendix is not  visualized.  No pericecal inflammatory stranding.  No diverticulitis.    Pelvis: The bladder is decompressed.  The wall is thickened likely  artifactual due to incomplete distension.  No bladder calculi.    There is an IUD noted.  The uterus is retroverted.    Peritoneum/Retroperitoneum: No free air.  No free fluid.    The visualized abdominal aorta is of normal size and caliber.  No significant  retroperitoneal adenopathy.    Bones/Soft Tissues: Visualized osseous structure intact  Impression: Bilateral 6 nonobstructing renal calculi      ASSESSMENT -    Diagnosis Orders   1. Pain  XR CERVICAL SPINE (4-5 VIEWS)          PLAN -I talked extensively about the nonoperative and operative treatment interventions.  We discussed anti-inflammatories.  She is smoking marijuana and using a vape pen.  She is taking baclofen.  She would like to get pregnant.  I talked about a caudal epidural steroid injection versus laminectomy and discectomy on the left side.  She does not report any foot drop or bowel or bladder complaints consistent with a cauda equina syndrome.  She will think about the information that I have given her.  She will let us know how she wants to proceed.  She has participated in at least 6 weeks of formalized physical therapy and is failed.  She also has a new symptom of neck pain.  We will get an MRI of her neck.  We are trying to get her scheduled for the caudal epidural

## 2024-04-29 ENCOUNTER — HOSPITAL ENCOUNTER (OUTPATIENT)
Dept: PHYSICAL THERAPY | Age: 34
Setting detail: THERAPIES SERIES
Discharge: HOME | End: 2024-04-29
Attending: ORTHOPAEDIC SURGERY
Payer: MEDICAID

## 2024-04-29 PROCEDURE — 97110 THERAPEUTIC EXERCISES: CPT

## 2024-04-29 PROCEDURE — 97012 MECHANICAL TRACTION THERAPY: CPT

## 2024-04-29 ASSESSMENT — PAIN SCALES - GENERAL: PAINLEVEL_OUTOF10: 8

## 2024-04-29 NOTE — PROGRESS NOTES
Intermittent  Amount of force: 55#/10# x 15 min prone in neutral H45''/R15'' to centralize pain                        Pt Education: Additional Comments: Consider KT       ASSESSMENT     Assessment: Assessment: Pt able to progress with core strengthening this date tolerating inc reps and additional ther ex with centralization of symptoms and dec low back pain. Pt reports ergonomic changes at work, suggested by therapist, have also helped dec her pain. Performed mechanical traction to further dec pain and centralize symptoms. Pain level 5/10 post traction with symptoms in low back and glutes.  Educated pt to continue with abdominal bracing with small bridge and or tiny march. Issued ice to go. Continue to progress as elvis.  Body Structures, Functions, Activity Limitations Requiring Skilled Therapeutic Intervention: Decreased functional mobility , Decreased ROM, Decreased strength, Increased pain, Decreased posture    Post-Treatment Pain Level:      Activity Tolerance: Patient tolerated treatment well    Therapy Prognosis: Good       GOALS   Patient Goals : Decrease pain and become more mobilie  Short Term Goals Completed by 2 wks Current Status Goal Status   I wtih HEP and report compliance with HEP 5/7 days per wk       Pt report any decrease in back and LE pain during ADLs       Pt report 25% or greater reduction of LE paresthesias                                                                   Long Term Goals Completed by 4-5 wks Current Status Goal Status   Improve lumbar spine AROM WFL (flex, bilat SB) and ext 50-75% or greater without reproducing LE radicular sxs       Improve Oswestry from 76% at time of IE to 35% or less at time of D/C       Improve core and LE strength any amount to improve ability to perform ADLs       Pt perform ADLs including housework and selfcare activities with 4/10 pain or less greater than 50% of the day       Pt report greater than 50% reduction of LE sxs

## 2024-05-01 ENCOUNTER — HOSPITAL ENCOUNTER (OUTPATIENT)
Dept: MRI IMAGING | Age: 34
Discharge: HOME | End: 2024-05-03
Payer: MEDICAID

## 2024-05-01 DIAGNOSIS — G89.29 CHRONIC SI JOINT PAIN: ICD-10-CM

## 2024-05-01 DIAGNOSIS — M53.3 CHRONIC SI JOINT PAIN: ICD-10-CM

## 2024-05-01 PROCEDURE — 72141 MRI NECK SPINE W/O DYE: CPT

## 2024-05-06 ENCOUNTER — OFFICE VISIT (OUTPATIENT)
Dept: ORTHOPEDIC SURGERY | Age: 34
End: 2024-05-06
Payer: MEDICAID

## 2024-05-06 VITALS
BODY MASS INDEX: 31.72 KG/M2 | HEIGHT: 61 IN | OXYGEN SATURATION: 99 % | WEIGHT: 168 LBS | TEMPERATURE: 97.3 F | HEART RATE: 78 BPM

## 2024-05-06 DIAGNOSIS — M54.9 UPPER BACK PAIN: Primary | ICD-10-CM

## 2024-05-06 PROCEDURE — G8417 CALC BMI ABV UP PARAM F/U: HCPCS | Performed by: ORTHOPAEDIC SURGERY

## 2024-05-06 PROCEDURE — 99213 OFFICE O/P EST LOW 20 MIN: CPT | Performed by: ORTHOPAEDIC SURGERY

## 2024-05-06 PROCEDURE — G8427 DOCREV CUR MEDS BY ELIG CLIN: HCPCS | Performed by: ORTHOPAEDIC SURGERY

## 2024-05-06 PROCEDURE — 1036F TOBACCO NON-USER: CPT | Performed by: ORTHOPAEDIC SURGERY

## 2024-05-06 RX ORDER — DIAZEPAM 5 MG/1
TABLET ORAL
COMMUNITY
Start: 2024-04-30

## 2024-05-06 NOTE — PROGRESS NOTES
Subjective:      Patient ID: Gina Kendrick is a 33 y.o. female who presents today for:  Chief Complaint   Patient presents with    Follow-up     Pt presents here for f/u to go over mri results       Subjective/Objective/Assessment/Plan:     SUBJECTIVE -the patient presents with low back pain and left-sided radicular complaints down the back of her thigh to her foot.  She does not know when this began.  She thinks that has happened for quite some time.    OBJECTIVE - The patient can rise up on their toes and rise up on her heels.  5 out of 5 hip flexion and knee extension strength bilaterally.  Sensation intact bilaterally in the lower extremities from L2-S1.      MRI CERVICAL SPINE WO CONTRAST  Narrative: EXAMINATION:  MRI OF THE CERVICAL SPINE WITHOUT CONTRAST 5/1/2024 8:12 am    TECHNIQUE:  Multiplanar multisequence MRI of the cervical spine was performed without the  administration of intravenous contrast.    COMPARISON:  None.    HISTORY:  ORDERING SYSTEM PROVIDED HISTORY: Chronic SI joint pain, Chronic SI joint pain  TECHNOLOGIST PROVIDED HISTORY:  What reading provider will be dictating this exam?->CRC    FINDINGS:  BONES/ALIGNMENT: There is normal alignment of the spine. The vertebral body  heights are maintained. The bone marrow signal appears unremarkable.    SPINAL CORD: No abnormal cord signal is seen.    SOFT TISSUES: No paraspinal mass identified.    C2-C3: There is no significant disc protrusion, spinal canal stenosis or  neural foraminal narrowing.    C3-C4: There is no significant disc protrusion, spinal canal stenosis or  neural foraminal narrowing.    C4-C5: There is no significant disc protrusion, spinal canal stenosis or  neural foraminal narrowing.    C5-C6: There is no significant disc protrusion, spinal canal stenosis or  neural foraminal narrowing.    C6-C7: There is no significant disc protrusion, spinal canal stenosis or  neural foraminal narrowing.    C7-T1: There is no significant disc

## 2024-05-07 ENCOUNTER — HOSPITAL ENCOUNTER (OUTPATIENT)
Dept: PHYSICAL THERAPY | Age: 34
Setting detail: THERAPIES SERIES
Discharge: HOME | End: 2024-05-07
Attending: ORTHOPAEDIC SURGERY
Payer: MEDICAID

## 2024-05-07 PROCEDURE — 97012 MECHANICAL TRACTION THERAPY: CPT

## 2024-05-07 PROCEDURE — 97530 THERAPEUTIC ACTIVITIES: CPT

## 2024-05-07 PROCEDURE — 97110 THERAPEUTIC EXERCISES: CPT

## 2024-05-07 NOTE — PROGRESS NOTES
Physical Therapy: Monthly Recheck   Patient: Gina Kendrick (33 y.o. female)   Examination Date: 2024  Plan of Care/Certification Expiration Date: 24    No data recorded   :  1990 # of Visits since SOC:   6   MRN: [id number]  CSN: [id number] Start of Care Date:   3/26/2024   Insurance: Payor: [facility] MEDICAID / Plan: [facility] MEDICA / Product Type: *No Product type* /   Insurance ID: [id number] - (Medicaid Managed) Secondary Insurance (if applicable):    Referring Physician: Thang Phillips DO Dr. Balk   PCP: Ken Stockton MD Visits to Date/Visits Approved: 6 /    10-14  No Show/Cancelled Appts: 0 / 1     Medical Diagnosis: No admission diagnoses are documented for this encounter. Lumbar radiculopathy  Treatment Diagnosis: Lumbar radiculopathy        SUBJECTIVE EXAMINATION   Pain Level:  6    Patient Comments: Subjective: MRI of cervical spine normal per pt, ordered MRI of thoracic spine. Pt reports some improvement since initiating therapy less pain, tingling and numbness down L LE more cental in lower back. Ave pain 6/10 over the last week. currently 6/10    HEP Compliance: Good        OBJECTIVE EXAMINATION       Lumbar Assessment     AROM Lumbar Spine   Flexion: 75% with increased back pain  Extension: 75% (feels good)  Lateral Flexion Right: 75%  Lateral Flexion Left: 75%       Left Strength  Right Strength         Strength LLE  L Hip Flexion: 4/5  L Hip Extension: 4-/5  L Hip ABduction: 4/5  L Hip Internal Rotation: 4/5  L Hip External Rotation: 4/5  L Knee Flexion: 4/5  L Knee Extension: 4/5, 4+/5  L Ankle Dorsiflexion: 4/5    Strength RLE  R Hip Flexion: 4/5  R Hip Extension: 4-/5  R Hip ABduction: 4/5  R Hip Internal Rotation: 4/5  R Hip External Rotation: 4/5  R Knee Flexion: 4/5  R Knee Extension: 4/5, 4+/5  R Ankle Dorsiflexion: 4/5       TREATMENT     Exercises:      Treatment Reasoning    Exercise 1: prone laying 3 min  Exercise 2: prone prop H5'' x 2-

## 2024-05-14 ENCOUNTER — HOSPITAL ENCOUNTER (OUTPATIENT)
Dept: PHYSICAL THERAPY | Age: 34
Setting detail: THERAPIES SERIES
Discharge: HOME | End: 2024-05-14
Attending: ORTHOPAEDIC SURGERY
Payer: MEDICAID

## 2024-05-14 PROCEDURE — 97110 THERAPEUTIC EXERCISES: CPT

## 2024-05-14 PROCEDURE — 97140 MANUAL THERAPY 1/> REGIONS: CPT

## 2024-05-14 PROCEDURE — 97012 MECHANICAL TRACTION THERAPY: CPT

## 2024-05-14 ASSESSMENT — PAIN DESCRIPTION - ORIENTATION: ORIENTATION: RIGHT;LEFT

## 2024-05-14 ASSESSMENT — PAIN DESCRIPTION - DESCRIPTORS: DESCRIPTORS: SORE;DISCOMFORT

## 2024-05-14 ASSESSMENT — PAIN DESCRIPTION - PAIN TYPE: TYPE: CHRONIC PAIN

## 2024-05-14 ASSESSMENT — PAIN SCALES - GENERAL: PAINLEVEL_OUTOF10: 6

## 2024-05-14 ASSESSMENT — PAIN DESCRIPTION - LOCATION: LOCATION: BACK

## 2024-05-14 NOTE — PROGRESS NOTES
Physical Therapy: Daily Note   Patient: Gina Kendrick (33 y.o. female)   Examination Date: 2024  Plan of Care/Certification Expiration Date: 24    No data recorded   :  1990 # of Visits since SOC:   7   MRN: [id number]  CSN: [id number] Start of Care Date:   3/26/2024   Insurance: Payor: [facility] MEDICAID / Plan: [facility] MEDICA / Product Type: *No Product type* /   Insurance ID: [id number] - (Medicaid Managed) Secondary Insurance (if applicable):    Referring Physician: Thang Phillips DO Dr. Balk   PCP: Ken Stockton MD Visits to Date/Visits Approved: 7 / 10    No Show/Cancelled Appts:      Medical Diagnosis: No admission diagnoses are documented for this encounter.    Treatment Diagnosis: Lumbar radiculopathy        SUBJECTIVE EXAMINATION   Pain Level: Pain Screening  Patient Currently in Pain: Yes  Pain Assessment: 0-10  Pain Level: 6  Best Pain Level: 4  Worst Pain Level: 9  Post Treatment Pain Level: 4  Pain Type: Chronic pain  Pain Location: Back  Pain Orientation: Right, Left  Pain Descriptors: Sore, Discomfort    Patient Comments: Subjective: Decreased Radicular S/S in the LEs and more centralized in the LB and more pain in her SITS bones rated as  5-6/10 pain level. She also feels like she pulled something her chest and some soreness in the upper thoracic. She did like the initiation of traction last visit.    HEP Compliance: Good        OBJECTIVE EXAMINATION   Restrictions:  No data recorded No data recorded No data recorded              TREATMENT     Exercises:      Treatment Reasoning    Exercise 4: knee fallouts x 5 hold 5 sec  Exercise 7: pelvic tilts; H3'' x 5 seated, x 5 hooklying H 3  Exercise 8: pelvic tilt with ball squeeze; H3'' x 10  Exercise 12: LTR; H10'' x 10  Exercise 13: figure 4 piriformis stretch 3 x 20- 30sec                          Manual Therapy: (CPT 97053) Treatment Reasoning     Soft Tissue Mobilizaton: STM/MFR to the thoracolumbar

## 2024-05-16 DIAGNOSIS — F32.A DEPRESSION, UNSPECIFIED DEPRESSION TYPE: ICD-10-CM

## 2024-05-16 DIAGNOSIS — M79.7 FIBROMYALGIA: ICD-10-CM

## 2024-05-16 DIAGNOSIS — F41.9 ANXIETY: ICD-10-CM

## 2024-05-16 RX ORDER — QUETIAPINE FUMARATE 100 MG/1
TABLET, FILM COATED ORAL
Qty: 135 TABLET | Refills: 0 | Status: SHIPPED | OUTPATIENT
Start: 2024-05-16

## 2024-05-20 ENCOUNTER — OFFICE VISIT (OUTPATIENT)
Dept: FAMILY MEDICINE CLINIC | Age: 34
End: 2024-05-20
Payer: MEDICAID

## 2024-05-20 VITALS
SYSTOLIC BLOOD PRESSURE: 124 MMHG | TEMPERATURE: 98.5 F | HEIGHT: 61 IN | OXYGEN SATURATION: 98 % | DIASTOLIC BLOOD PRESSURE: 82 MMHG | WEIGHT: 177.6 LBS | BODY MASS INDEX: 33.53 KG/M2 | HEART RATE: 90 BPM

## 2024-05-20 DIAGNOSIS — H65.03 NON-RECURRENT ACUTE SEROUS OTITIS MEDIA OF BOTH EARS: ICD-10-CM

## 2024-05-20 DIAGNOSIS — R09.81 NASAL CONGESTION: ICD-10-CM

## 2024-05-20 DIAGNOSIS — R42 VERTIGO: ICD-10-CM

## 2024-05-20 DIAGNOSIS — R51.9 NONINTRACTABLE HEADACHE, UNSPECIFIED CHRONICITY PATTERN, UNSPECIFIED HEADACHE TYPE: Primary | ICD-10-CM

## 2024-05-20 PROCEDURE — G8427 DOCREV CUR MEDS BY ELIG CLIN: HCPCS | Performed by: NURSE PRACTITIONER

## 2024-05-20 PROCEDURE — 1036F TOBACCO NON-USER: CPT | Performed by: NURSE PRACTITIONER

## 2024-05-20 PROCEDURE — G8417 CALC BMI ABV UP PARAM F/U: HCPCS | Performed by: NURSE PRACTITIONER

## 2024-05-20 PROCEDURE — 99213 OFFICE O/P EST LOW 20 MIN: CPT | Performed by: NURSE PRACTITIONER

## 2024-05-20 RX ORDER — CETIRIZINE HYDROCHLORIDE 10 MG/1
10 TABLET ORAL DAILY
Qty: 30 TABLET | Refills: 0 | Status: SHIPPED | OUTPATIENT
Start: 2024-05-20 | End: 2024-06-19

## 2024-05-20 RX ORDER — FLUTICASONE PROPIONATE 50 MCG
SPRAY, SUSPENSION (ML) NASAL
Qty: 1 EACH | Refills: 1 | Status: SHIPPED | OUTPATIENT
Start: 2024-05-20

## 2024-05-20 RX ORDER — KETOROLAC TROMETHAMINE 30 MG/ML
60 INJECTION, SOLUTION INTRAMUSCULAR; INTRAVENOUS ONCE
Status: COMPLETED | OUTPATIENT
Start: 2024-05-20 | End: 2024-05-20

## 2024-05-20 RX ADMIN — KETOROLAC TROMETHAMINE 60 MG: 30 INJECTION, SOLUTION INTRAMUSCULAR; INTRAVENOUS at 10:17

## 2024-05-20 ASSESSMENT — ENCOUNTER SYMPTOMS
WHEEZING: 0
RHINORRHEA: 0
COUGH: 1
VOMITING: 0
ABDOMINAL PAIN: 0
SORE THROAT: 0
BACK PAIN: 0
CHEST TIGHTNESS: 0
DIARRHEA: 0
NAUSEA: 1
SHORTNESS OF BREATH: 0

## 2024-05-20 NOTE — PROGRESS NOTES
Gina Kendrick (:  1990) is a 33 y.o. female, Established patient, here for evaluation of the following chief complaint(s):  Headache (Patient c/o pain level of 8 - starting 4 weeks ago. Patient states she stopped drinking wine at night and pain has started since. Patient states she had MRI completed. Patient took Excedrin yesterday which seemed to help a little, but does not take regularly. Patient c/o dizziness and blurred vision, unsteady when walking. )      Vitals:    24 0928   BP: 124/82   Pulse: 90   Temp: 98.5 °F (36.9 °C)   SpO2: 98%       ASSESSMENT/PLAN:  1. Nonintractable headache, unspecified chronicity pattern, unspecified headache type  -     ketorolac (TORADOL) injection 60 mg; 60 mg, IntraMUSCular, ONCE, 1 dose, On Mon 24 at 1015Do not administer for more than 5 days.  -     Headache could be from sinus, advised Flonase and Zyrtec over the next week or two to see if improved.    -     Pt interested in having scan of head, advised to follow up with PCP if headache persists (unable to order CT/MRI thru walk-in)  2. Nasal congestion  -     fluticasone (FLONASE) 50 MCG/ACT nasal spray; Take 1 spray each nostril at night, Disp-1 each, R-1Normal  -     cetirizine (ZYRTEC) 10 MG tablet; Take 1 tablet by mouth daily, Disp-30 tablet, R-0Normal  3. Non-recurrent acute serous otitis media of both ears  -     fluticasone (FLONASE) 50 MCG/ACT nasal spray; Take 1 spray each nostril at night, Disp-1 each, R-1Normal  -     cetirizine (ZYRTEC) 10 MG tablet; Take 1 tablet by mouth daily, Disp-30 tablet, R-0Normal  4. Vertigo  -     fluticasone (FLONASE) 50 MCG/ACT nasal spray; Take 1 spray each nostril at night, Disp-1 each, R-1Normal  -     cetirizine (ZYRTEC) 10 MG tablet; Take 1 tablet by mouth daily, Disp-30 tablet, R-0Normal        Return if symptoms worsen or fail to improve.      SUBJECTIVE/OBJECTIVE:    Headache for the past month after she stopped drinking wine about 1 month ago.

## 2024-05-21 ENCOUNTER — OFFICE VISIT (OUTPATIENT)
Dept: FAMILY MEDICINE CLINIC | Age: 34
End: 2024-05-21
Payer: MEDICAID

## 2024-05-21 ENCOUNTER — HOSPITAL ENCOUNTER (OUTPATIENT)
Dept: PHYSICAL THERAPY | Age: 34
Setting detail: THERAPIES SERIES
Discharge: HOME | End: 2024-05-21
Attending: ORTHOPAEDIC SURGERY
Payer: MEDICAID

## 2024-05-21 VITALS
HEART RATE: 89 BPM | BODY MASS INDEX: 33.91 KG/M2 | WEIGHT: 179.6 LBS | HEIGHT: 61 IN | SYSTOLIC BLOOD PRESSURE: 108 MMHG | OXYGEN SATURATION: 97 % | DIASTOLIC BLOOD PRESSURE: 88 MMHG

## 2024-05-21 DIAGNOSIS — R51.9 NONINTRACTABLE HEADACHE, UNSPECIFIED CHRONICITY PATTERN, UNSPECIFIED HEADACHE TYPE: Primary | ICD-10-CM

## 2024-05-21 DIAGNOSIS — F41.9 ANXIETY: ICD-10-CM

## 2024-05-21 PROCEDURE — 99213 OFFICE O/P EST LOW 20 MIN: CPT | Performed by: FAMILY MEDICINE

## 2024-05-21 PROCEDURE — G8417 CALC BMI ABV UP PARAM F/U: HCPCS | Performed by: FAMILY MEDICINE

## 2024-05-21 PROCEDURE — 97110 THERAPEUTIC EXERCISES: CPT

## 2024-05-21 PROCEDURE — G8427 DOCREV CUR MEDS BY ELIG CLIN: HCPCS | Performed by: FAMILY MEDICINE

## 2024-05-21 PROCEDURE — 97140 MANUAL THERAPY 1/> REGIONS: CPT

## 2024-05-21 PROCEDURE — 1036F TOBACCO NON-USER: CPT | Performed by: FAMILY MEDICINE

## 2024-05-21 RX ORDER — KETOROLAC TROMETHAMINE 10 MG/1
10 TABLET, FILM COATED ORAL EVERY 6 HOURS PRN
Qty: 20 TABLET | Refills: 0 | Status: SHIPPED | OUTPATIENT
Start: 2024-05-21 | End: 2025-05-21

## 2024-05-21 RX ORDER — KETOROLAC TROMETHAMINE 30 MG/ML
60 INJECTION, SOLUTION INTRAMUSCULAR; INTRAVENOUS ONCE
Status: COMPLETED | OUTPATIENT
Start: 2024-05-21 | End: 2024-05-21

## 2024-05-21 RX ADMIN — KETOROLAC TROMETHAMINE 60 MG: 30 INJECTION, SOLUTION INTRAMUSCULAR; INTRAVENOUS at 11:31

## 2024-05-21 ASSESSMENT — ENCOUNTER SYMPTOMS
DIARRHEA: 0
ABDOMINAL PAIN: 0
RHINORRHEA: 0
BACK PAIN: 1
WHEEZING: 0
COUGH: 0
SORE THROAT: 0
SHORTNESS OF BREATH: 0
CONSTIPATION: 0

## 2024-05-21 NOTE — PROGRESS NOTES
Physical Therapy  Physical Therapy: Daily Note   Patient: Gina Kendrick (33 y.o. female)   Examination Date: 2024  Plan of Care/Certification Expiration Date: 24    No data recorded   :  1990 # of Visits since SOC:   8   MRN: [id number]  CSN: [id number] Start of Care Date:   3/26/2024   Insurance: Payor: [facility] MEDICAID / Plan: [facility] MEDICA / Product Type: *No Product type* /   Insurance ID: [id number] - (Medicaid Managed) Secondary Insurance (if applicable):    Referring Physician: Thang Phillips DO Dr. Balk   PCP: Ken Stockton MD Visits to Date/Visits Approved: 8 / 10    No Show/Cancelled Appts: 0      Medical Diagnosis: No admission diagnoses are documented for this encounter. Lumbar radiculopathy  Treatment Diagnosis: Lumbar radiculopathy        SUBJECTIVE EXAMINATION   Pain Level:      Patient Comments: Subjective: I have been better since we started therapy. I still have general pain center of low back. The pain down my legs are not to bad. I have been to three different doctors for more opinions. I have been told my neck pain might be coming from my back pain. I had a bad day on  feeling nausea and dizziness from my neck pain, mid back back pain as well as low back running around left hip. I have not been able to do HEP due to the pain being to intense the last few days. My back pain all together is 7/10 overall.    HEP Compliance: Poor        OBJECTIVE EXAMINATION   Restrictions:  No data recorded No data recorded No data recorded              TREATMENT     Exercises:      Treatment Reasoning    Exercise 5: SKTC x 3 H 20 - 30 GENTLE  Exercise 7: pelvic tilts; H3'' x 5 hooklying H 3  Exercise 13: figure 4 piriformis stretch 3 x 20- 30sec  Exercise 14: Supine B hamstring stretch 20-30 sec H x 3                          Manual Therapy: (CPT 76782) Treatment Reasoning     Joint Mobilization: Prone thoracic and lumbosacral PM mobs Grade I-II to reduce

## 2024-05-21 NOTE — PROGRESS NOTES
[facility] PRIMARY CARE  [facility] [address]  [facility] [address]  Dept: [phone]  Dept Fax: [phone]     Chief Complaint:  Chief Complaint   Patient presents with    Headache     Pain traveling behind bilateral ears (left is worse), dizziness, blurred vision x 4-5 weeks Patient was seen at urgent care yesterday, had MRI several weeks ago.       Vitals:    05/21/24 1057   BP: 108/88   Site: Right Upper Arm   Position: Sitting   Cuff Size: Medium Adult   Pulse: 89   SpO2: 97%   Weight: 81.5 kg (179 lb 9.6 oz)   Height: 1.549 m (5' 1\")       HPI:  33 y.o.female who presents for the following:      Unwell feeling: seen in walkin clinic 5/20/24 for HAs; pain goes broadly along neck to the sides of the head with pressure; intermittent throughout the day x4 weeks; little relief with excedrin; quit drinking wine at night (2 large glasses nightly); given toradol, flonase, zyrtec; still getting nausea with this; sometimes feels a whole body tremor; no fevers; currently seeing Spine clinic for lower back/sacral pain; notes some excess worries at times due to anxiety; right now worrying about clots or brain aneurysm; sleep good with seroquel; cut back her coffee from 1 cup daily to 2-3x/week; the toradol helped much    -----------------------------------------------------------------------------    Assessment/Plan:  33 y.o. female here mainly for the following:  HA  Likely tension type; toradol for now; discussed limits to avoid overuse  Might see improvement after her chronic pain improves  Worsening symptoms could be related to caffeine/etoh changes recently or her chronic spine pains or her baseline anxiety     Diagnosis Orders   1. Nonintractable headache, unspecified chronicity pattern, unspecified headache type  ketorolac (TORADOL) 10 MG tablet    ketorolac (TORADOL) injection 60 mg      2. Anxiety             Return if symptoms worsen or fail to improve.    Ken Stockton,

## 2024-05-28 ENCOUNTER — HOSPITAL ENCOUNTER (OUTPATIENT)
Dept: MRI IMAGING | Age: 34
Discharge: HOME | End: 2024-05-30
Payer: MEDICAID

## 2024-05-28 DIAGNOSIS — M54.9 UPPER BACK PAIN: ICD-10-CM

## 2024-05-28 PROCEDURE — 72146 MRI CHEST SPINE W/O DYE: CPT

## 2024-06-04 ENCOUNTER — HOSPITAL ENCOUNTER (OUTPATIENT)
Dept: PHYSICAL THERAPY | Age: 34
Setting detail: THERAPIES SERIES
Discharge: HOME | End: 2024-06-04
Attending: ORTHOPAEDIC SURGERY
Payer: MEDICAID

## 2024-06-04 PROCEDURE — 97012 MECHANICAL TRACTION THERAPY: CPT

## 2024-06-04 PROCEDURE — 97110 THERAPEUTIC EXERCISES: CPT

## 2024-06-04 NOTE — PROGRESS NOTES
Physical Therapy  Physical Therapy: Daily Note   Patient: Gina Kendrick (33 y.o. female)   Examination Date: 2024  Plan of Care/Certification Expiration Date: 24    No data recorded   :  1990 # of Visits since SOC:   9   MRN: [id number]  CSN: [id number] Start of Care Date:   3/26/2024   Insurance: Payor: [email or web address] [facility] MEDICAID / Plan: [facility] MEDICA / Product Type: *No Product type* /   Insurance ID: [id number] - (Medicaid Managed) Secondary Insurance (if applicable):    Referring Physician: Thang Phillips DO Dr. Balk   PCP: Ken Stockton MD Visits to Date/Visits Approved: 9 / 10    No Show/Cancelled Appts:      Medical Diagnosis: No admission diagnoses are documented for this encounter. Lumbar radiculopathy  Treatment Diagnosis: Lumbar radiculopathy        SUBJECTIVE EXAMINATION   Pain Level:      Patient Comments: Subjective: My back is so and so but my neck hurts more than everthing. I do feel better than when I started but I still have some pain. I have not been back at work yet where I hurt with sitting. I feel the pain in my butt. My back pain is 3/10 and my neck is tender 3/10. I have been feeling my pain after working through the day. I do feel sore when I wake up. The more I move the better I feel until night.    HEP Compliance: Fair  Previous treatments prior to current episode?: Injections     OBJECTIVE EXAMINATION   Restrictions:  No data recorded No data recorded No data recorded              TREATMENT     Exercises:      Treatment Reasoning    Exercise 1: prone laying 2 min  Exercise 2: prone prop H5'' x 2- increased headache thus deferred additional reps  Exercise 3: scap retraction x 10 H3  Exercise 5: SKTC x 3 H 20 - 30 GENTLE  Exercise 6: p-ball bounce, CW CCW  10 each scap retract; H5'' x 10  Exercise 7: PB pelvic tilts; H3'' x  10'  Exercise 8: PB seated to anterior walkout with supine plank x 10',  Exercise 9: PB prone walkout plank x 10'  Exercise

## 2024-06-06 ENCOUNTER — PREP FOR PROCEDURE (OUTPATIENT)
Dept: ORTHOPEDIC SURGERY | Age: 34
End: 2024-06-06

## 2024-06-06 DIAGNOSIS — M51.26 HERNIATED LUMBAR INTERVERTEBRAL DISC: ICD-10-CM

## 2024-06-10 ENCOUNTER — HOSPITAL ENCOUNTER (OUTPATIENT)
Dept: PHYSICAL THERAPY | Age: 34
Setting detail: THERAPIES SERIES
Discharge: HOME | End: 2024-06-10
Attending: ORTHOPAEDIC SURGERY
Payer: MEDICAID

## 2024-06-10 PROCEDURE — 97012 MECHANICAL TRACTION THERAPY: CPT

## 2024-06-10 PROCEDURE — 97110 THERAPEUTIC EXERCISES: CPT

## 2024-06-10 PROCEDURE — 97530 THERAPEUTIC ACTIVITIES: CPT

## 2024-06-10 NOTE — PROGRESS NOTES
Level:  3-4    Activity Tolerance: Patient tolerated treatment well    Therapy Prognosis: Good       GOALS   Patient Goals : Decrease pain and become more mobilie  Short Term Goals Completed by 2 wks Current Status Goal Status   I wtih HEP and report compliance with HEP 5/7 days per wk Pt reports performing HEP 3-4 x per wk, however doing postural and standing lumbar ext ex daily Met   Pt report any decrease in back and LE pain during ADLs Pt reports very little LE pain in lasat 2 weeks less bakc pain Met   Pt report 25% or greater reduction of LE paresthesias Pt reports 85% improvement regarding LE parestehesias Met                                                               Long Term Goals Completed by 4-5 wks Current Status Goal Status   Improve lumbar spine AROM WFL (flex, bilat SB) and ext 50-75% or greater without reproducing LE radicular sxs Lumbar AROM approx 75% - WFL all planes wihtout LE sxs (increased back pain terminal ext ROM Met   Improve Oswestry from 76% at time of IE to 35% or less at time of D/C Recheck 6/10/24: Oswestry= 30% improved from 76% Met   Improve core and LE strength any amount to improve ability to perform ADLs Core and LE strength have improved all planes Met   Pt perform ADLs including housework and selfcare activities with 4/10 pain or less greater than 50% of the day Ave pain 4-5/10 low back with less LE sxs during ADLs Met   Pt report greater than 50% reduction of LE sxs Pt reports 85% improvement regarding LE parestehesias Met                                                TREATMENT PLAN   Plan Frequency: Recommend D/C with HEP           Therapy Time  Individual Time In:    1205     Individual Time Out:  1310  Minutes:  65        Electronically signed by Bertha Godinez, PT  on 6/10/2024 at 1:08 PM   POC NOTE

## 2024-06-11 ENCOUNTER — OFFICE VISIT (OUTPATIENT)
Dept: ORTHOPEDIC SURGERY | Age: 34
End: 2024-06-11
Payer: MEDICAID

## 2024-06-11 VITALS
WEIGHT: 179 LBS | BODY MASS INDEX: 33.79 KG/M2 | TEMPERATURE: 97.1 F | HEART RATE: 74 BPM | HEIGHT: 61 IN | OXYGEN SATURATION: 98 %

## 2024-06-11 DIAGNOSIS — R51.9 NONINTRACTABLE HEADACHE, UNSPECIFIED CHRONICITY PATTERN, UNSPECIFIED HEADACHE TYPE: ICD-10-CM

## 2024-06-11 DIAGNOSIS — M54.2 CERVICAL PAIN: Primary | ICD-10-CM

## 2024-06-11 PROCEDURE — 99213 OFFICE O/P EST LOW 20 MIN: CPT | Performed by: ORTHOPAEDIC SURGERY

## 2024-06-11 PROCEDURE — G8417 CALC BMI ABV UP PARAM F/U: HCPCS | Performed by: ORTHOPAEDIC SURGERY

## 2024-06-11 PROCEDURE — G8427 DOCREV CUR MEDS BY ELIG CLIN: HCPCS | Performed by: ORTHOPAEDIC SURGERY

## 2024-06-11 PROCEDURE — 1036F TOBACCO NON-USER: CPT | Performed by: ORTHOPAEDIC SURGERY

## 2024-06-11 RX ORDER — GABAPENTIN 600 MG/1
600 TABLET ORAL 2 TIMES DAILY
COMMUNITY
Start: 2019-01-16

## 2024-06-11 RX ORDER — QUETIAPINE FUMARATE 100 MG/1
TABLET, FILM COATED ORAL
COMMUNITY
Start: 2024-05-20

## 2024-06-11 NOTE — PROGRESS NOTES
2 times daily for 90 days. Intended supply: 90 days, Disp: 180 capsule, Rfl: 1  --------------------------------------------------------------------------------------------------------------    Thang Phillips DO  Orthopedic and Spine Surgeon  [facility]  [phone]

## 2024-06-12 DIAGNOSIS — L70.0 CYSTIC ACNE VULGARIS: ICD-10-CM

## 2024-06-12 RX ORDER — KETOROLAC TROMETHAMINE 10 MG/1
10 TABLET, FILM COATED ORAL EVERY 6 HOURS PRN
Qty: 20 TABLET | Refills: 0 | Status: SHIPPED | OUTPATIENT
Start: 2024-06-12 | End: 2025-06-12

## 2024-06-12 NOTE — TELEPHONE ENCOUNTER
Comments:     Last Office Visit (last PCP visit):   5/21/2024    Next Visit Date:  Future Appointments   Date Time Provider Department Center   6/19/2024  8:15 AM Basinski, Roman J, PA [facility]   7/11/2024  9:45 AM Basinski, Roman J, PA [facility]       **If hasn't been seen in over a year OR hasn't followed up according to last diabetes/ADHD visit, make appointment for patient before sending refill to provider.    Rx requested:  Requested Prescriptions     Pending Prescriptions Disp Refills    tretinoin (RETIN-A) 0.025 % cream 60 g 2     Sig: Apply topically nightly.

## 2024-06-19 ENCOUNTER — OFFICE VISIT (OUTPATIENT)
Dept: ORTHOPEDIC SURGERY | Age: 34
End: 2024-06-19

## 2024-06-19 VITALS
TEMPERATURE: 98.1 F | HEIGHT: 61 IN | HEART RATE: 56 BPM | WEIGHT: 176 LBS | SYSTOLIC BLOOD PRESSURE: 124 MMHG | BODY MASS INDEX: 33.23 KG/M2 | OXYGEN SATURATION: 98 % | DIASTOLIC BLOOD PRESSURE: 78 MMHG

## 2024-06-19 DIAGNOSIS — Z01.818 PRE-OP EXAM: ICD-10-CM

## 2024-06-19 DIAGNOSIS — Z01.818 PRE-OP EXAM: Primary | ICD-10-CM

## 2024-06-19 LAB
ALBUMIN SERPL-MCNC: 4.5 G/DL (ref 3.5–4.6)
ALP SERPL-CCNC: 55 U/L (ref 40–130)
ALT SERPL-CCNC: 13 U/L (ref 0–33)
ANION GAP SERPL CALCULATED.3IONS-SCNC: 11 MEQ/L (ref 9–15)
AST SERPL-CCNC: 16 U/L (ref 0–35)
BILIRUB SERPL-MCNC: 0.4 MG/DL (ref 0.2–0.7)
BUN SERPL-MCNC: 12 MG/DL (ref 6–20)
CALCIUM SERPL-MCNC: 9.5 MG/DL (ref 8.5–9.9)
CHLORIDE SERPL-SCNC: 102 MEQ/L (ref 95–107)
CO2 SERPL-SCNC: 26 MEQ/L (ref 20–31)
CREAT SERPL-MCNC: 0.69 MG/DL (ref 0.5–0.9)
GLOBULIN SER CALC-MCNC: 3 G/DL (ref 2.3–3.5)
GLUCOSE SERPL-MCNC: 91 MG/DL (ref 70–99)
POTASSIUM SERPL-SCNC: 4.1 MEQ/L (ref 3.4–4.9)
PROT SERPL-MCNC: 7.5 G/DL (ref 6.3–8)
SODIUM SERPL-SCNC: 139 MEQ/L (ref 135–144)

## 2024-06-19 PROCEDURE — PREOPEXAM PRE-OP EXAM: Performed by: PHYSICIAN ASSISTANT

## 2024-06-19 NOTE — H&P
Subjective:     Patient is a 33 y.o.  female presented with a history of low back.  Onset of symptoms was abrupt 6 months ago with gradually worsening course since that time. She is being admitted for surgical management of this condition. The indications for the procedure include MRI scan showing lumbar disc herniation.    Patient Active Problem List    Diagnosis Date Noted    Herniated lumbar intervertebral disc 06/06/2024    Anxiety 05/21/2024    Herniation of intervertebral disc between L5 and S1 02/26/2024     Past Medical History:   Diagnosis Date    ADHD     Anxiety     Arthritis     Arthritis     Chronic back pain     Chronic headaches     Depression     Fibromyalgia     Gastritis     Hx of degenerative disc disease     Irritable bowel syndrome     PTSD (post-traumatic stress disorder)       Past Surgical History:   Procedure Laterality Date    CHOLECYSTECTOMY      COLONOSCOPY  2021    TONSILLECTOMY      WISDOM TOOTH EXTRACTION        Not in a hospital admission.  No Known Allergies   Social History     Tobacco Use    Smoking status: Never    Smokeless tobacco: Never   Substance Use Topics    Alcohol use: Yes     Comment: socially 1-2 times per week      Family History   Problem Relation Age of Onset    High Blood Pressure Mother     Arthritis Mother     Diabetes Mother     Stroke Mother     Arthritis Father     High Blood Pressure Father       Review of Systems  A comprehensive review of systems was negative except for: Musculoskeletal: positive for arthralgias, back pain, myalgias, and low back pain    Objective:     @IPVITALS[8@  /78 (Site: Left Upper Arm, Position: Standing, Cuff Size: Medium Adult)   Pulse 56   Temp 98.1 °F (36.7 °C) (Temporal)   Ht 1.549 m (5' 1\")   Wt 79.8 kg (176 lb)   SpO2 98%   BMI 33.25 kg/m²   General appearance: alert, appears stated age, and cooperative  Head: Normocephalic, without obvious abnormality, atraumatic  Eyes: conjunctivae/corneas clear. PERRL,

## 2024-06-27 ENCOUNTER — PREP FOR PROCEDURE (OUTPATIENT)
Dept: ORTHOPEDIC SURGERY | Age: 34
End: 2024-06-27

## 2024-07-08 RX ORDER — DIAZEPAM 5 MG/1
5 TABLET ORAL EVERY 6 HOURS PRN
COMMUNITY
Start: 2024-06-13

## 2024-07-08 RX ORDER — SODIUM FLUORIDE 5 MG/G
GEL, DENTIFRICE DENTAL
COMMUNITY
Start: 2024-06-11

## 2024-07-08 NOTE — PROGRESS NOTES
Phone PAT completed, pt given pre-op instructions (per orange sheet) and verbalized understanding, pt stated she didn't get notified about her hibiclens from the pharmacy, stated she was going to try to pick it up today to use it. POC preg test ordered for AM of OR. Electronically signed by Jalyn Zamarripa RN on 7/8/2024 at 4:34 PM

## 2024-07-09 ENCOUNTER — HOSPITAL ENCOUNTER (OUTPATIENT)
Age: 34
Setting detail: OUTPATIENT SURGERY
Discharge: HOME | End: 2024-07-09
Attending: ORTHOPAEDIC SURGERY | Admitting: ORTHOPAEDIC SURGERY
Payer: MEDICAID

## 2024-07-09 ENCOUNTER — ANESTHESIA EVENT (OUTPATIENT)
Dept: OPERATING ROOM | Age: 34
End: 2024-07-09
Payer: MEDICAID

## 2024-07-09 ENCOUNTER — ANESTHESIA (OUTPATIENT)
Dept: OPERATING ROOM | Age: 34
End: 2024-07-09
Payer: MEDICAID

## 2024-07-09 ENCOUNTER — APPOINTMENT (OUTPATIENT)
Dept: GENERAL RADIOLOGY | Age: 34
End: 2024-07-09
Attending: ORTHOPAEDIC SURGERY
Payer: MEDICAID

## 2024-07-09 VITALS
DIASTOLIC BLOOD PRESSURE: 67 MMHG | HEART RATE: 65 BPM | RESPIRATION RATE: 16 BRPM | BODY MASS INDEX: 33.23 KG/M2 | HEIGHT: 61 IN | OXYGEN SATURATION: 99 % | SYSTOLIC BLOOD PRESSURE: 103 MMHG | WEIGHT: 176 LBS | TEMPERATURE: 97.4 F

## 2024-07-09 DIAGNOSIS — R52 PAIN: ICD-10-CM

## 2024-07-09 LAB
HCG, URINE, POC: NEGATIVE
Lab: NORMAL
NEGATIVE QC PASS/FAIL: NORMAL
POSITIVE QC PASS/FAIL: NORMAL

## 2024-07-09 PROCEDURE — 6360000002 HC RX W HCPCS: Performed by: ORTHOPAEDIC SURGERY

## 2024-07-09 PROCEDURE — 2580000003 HC RX 258: Performed by: ANESTHESIOLOGY

## 2024-07-09 PROCEDURE — 2709999900 HC NON-CHARGEABLE SUPPLY: Performed by: ORTHOPAEDIC SURGERY

## 2024-07-09 PROCEDURE — 2500000003 HC RX 250 WO HCPCS: Performed by: ORTHOPAEDIC SURGERY

## 2024-07-09 PROCEDURE — 6360000002 HC RX W HCPCS

## 2024-07-09 PROCEDURE — 7100000010 HC PHASE II RECOVERY - FIRST 15 MIN: Performed by: ORTHOPAEDIC SURGERY

## 2024-07-09 PROCEDURE — 62323 NJX INTERLAMINAR LMBR/SAC: CPT | Performed by: ORTHOPAEDIC SURGERY

## 2024-07-09 PROCEDURE — 3700000000 HC ANESTHESIA ATTENDED CARE: Performed by: ORTHOPAEDIC SURGERY

## 2024-07-09 PROCEDURE — 2500000003 HC RX 250 WO HCPCS: Performed by: ANESTHESIOLOGY

## 2024-07-09 PROCEDURE — 7100000011 HC PHASE II RECOVERY - ADDTL 15 MIN: Performed by: ORTHOPAEDIC SURGERY

## 2024-07-09 PROCEDURE — 3600000004 HC SURGERY LEVEL 4 BASE: Performed by: ORTHOPAEDIC SURGERY

## 2024-07-09 RX ORDER — ONDANSETRON 2 MG/ML
4 INJECTION INTRAMUSCULAR; INTRAVENOUS
Status: CANCELLED | OUTPATIENT
Start: 2024-07-09 | End: 2024-07-10

## 2024-07-09 RX ORDER — SODIUM CHLORIDE 0.9 % (FLUSH) 0.9 %
5-40 SYRINGE (ML) INJECTION EVERY 12 HOURS SCHEDULED
Status: DISCONTINUED | OUTPATIENT
Start: 2024-07-09 | End: 2024-07-09 | Stop reason: HOSPADM

## 2024-07-09 RX ORDER — SODIUM CHLORIDE 9 MG/ML
INJECTION, SOLUTION INTRAVENOUS PRN
Status: CANCELLED | OUTPATIENT
Start: 2024-07-09

## 2024-07-09 RX ORDER — DEXAMETHASONE SODIUM PHOSPHATE 10 MG/ML
INJECTION INTRAMUSCULAR; INTRAVENOUS PRN
Status: DISCONTINUED | OUTPATIENT
Start: 2024-07-09 | End: 2024-07-09 | Stop reason: ALTCHOICE

## 2024-07-09 RX ORDER — METOCLOPRAMIDE HYDROCHLORIDE 5 MG/ML
10 INJECTION INTRAMUSCULAR; INTRAVENOUS
Status: CANCELLED | OUTPATIENT
Start: 2024-07-09 | End: 2024-07-10

## 2024-07-09 RX ORDER — SODIUM CHLORIDE 0.9 % (FLUSH) 0.9 %
5-40 SYRINGE (ML) INJECTION PRN
Status: CANCELLED | OUTPATIENT
Start: 2024-07-09

## 2024-07-09 RX ORDER — SODIUM CHLORIDE, SODIUM LACTATE, POTASSIUM CHLORIDE, CALCIUM CHLORIDE 600; 310; 30; 20 MG/100ML; MG/100ML; MG/100ML; MG/100ML
INJECTION, SOLUTION INTRAVENOUS CONTINUOUS
Status: DISCONTINUED | OUTPATIENT
Start: 2024-07-09 | End: 2024-07-09 | Stop reason: HOSPADM

## 2024-07-09 RX ORDER — PROPOFOL 10 MG/ML
INJECTION, EMULSION INTRAVENOUS PRN
Status: DISCONTINUED | OUTPATIENT
Start: 2024-07-09 | End: 2024-07-09 | Stop reason: SDUPTHER

## 2024-07-09 RX ORDER — SODIUM CHLORIDE 0.9 % (FLUSH) 0.9 %
5-40 SYRINGE (ML) INJECTION EVERY 12 HOURS SCHEDULED
Status: CANCELLED | OUTPATIENT
Start: 2024-07-09

## 2024-07-09 RX ORDER — SODIUM CHLORIDE 9 MG/ML
INJECTION, SOLUTION INTRAVENOUS PRN
Status: DISCONTINUED | OUTPATIENT
Start: 2024-07-09 | End: 2024-07-09 | Stop reason: HOSPADM

## 2024-07-09 RX ORDER — DEXTROSE MONOHYDRATE 100 MG/ML
INJECTION, SOLUTION INTRAVENOUS CONTINUOUS PRN
Status: CANCELLED | OUTPATIENT
Start: 2024-07-09

## 2024-07-09 RX ORDER — IPRATROPIUM BROMIDE AND ALBUTEROL SULFATE 2.5; .5 MG/3ML; MG/3ML
1 SOLUTION RESPIRATORY (INHALATION)
Status: CANCELLED | OUTPATIENT
Start: 2024-07-09 | End: 2024-07-10

## 2024-07-09 RX ORDER — HYDRALAZINE HYDROCHLORIDE 20 MG/ML
10 INJECTION INTRAMUSCULAR; INTRAVENOUS
Status: CANCELLED | OUTPATIENT
Start: 2024-07-09

## 2024-07-09 RX ORDER — NALOXONE HYDROCHLORIDE 0.4 MG/ML
INJECTION, SOLUTION INTRAMUSCULAR; INTRAVENOUS; SUBCUTANEOUS PRN
Status: CANCELLED | OUTPATIENT
Start: 2024-07-09

## 2024-07-09 RX ORDER — GLUCAGON 1 MG/ML
1 KIT INJECTION PRN
Status: CANCELLED | OUTPATIENT
Start: 2024-07-09

## 2024-07-09 RX ORDER — SODIUM CHLORIDE 0.9 % (FLUSH) 0.9 %
5-40 SYRINGE (ML) INJECTION PRN
Status: DISCONTINUED | OUTPATIENT
Start: 2024-07-09 | End: 2024-07-09 | Stop reason: HOSPADM

## 2024-07-09 RX ORDER — LABETALOL HYDROCHLORIDE 5 MG/ML
10 INJECTION, SOLUTION INTRAVENOUS
Status: CANCELLED | OUTPATIENT
Start: 2024-07-09

## 2024-07-09 RX ORDER — LIDOCAINE HYDROCHLORIDE 10 MG/ML
INJECTION, SOLUTION EPIDURAL; INFILTRATION; INTRACAUDAL; PERINEURAL PRN
Status: DISCONTINUED | OUTPATIENT
Start: 2024-07-09 | End: 2024-07-09 | Stop reason: ALTCHOICE

## 2024-07-09 RX ORDER — LIDOCAINE HYDROCHLORIDE 10 MG/ML
1 INJECTION, SOLUTION EPIDURAL; INFILTRATION; INTRACAUDAL; PERINEURAL
Status: COMPLETED | OUTPATIENT
Start: 2024-07-09 | End: 2024-07-09

## 2024-07-09 RX ADMIN — SODIUM CHLORIDE, POTASSIUM CHLORIDE, SODIUM LACTATE AND CALCIUM CHLORIDE: 600; 310; 30; 20 INJECTION, SOLUTION INTRAVENOUS at 07:54

## 2024-07-09 RX ADMIN — PROPOFOL 50 MG: 10 INJECTION, EMULSION INTRAVENOUS at 08:03

## 2024-07-09 RX ADMIN — LIDOCAINE HYDROCHLORIDE 1 ML: 10 INJECTION, SOLUTION EPIDURAL; INFILTRATION; INTRACAUDAL; PERINEURAL at 07:54

## 2024-07-09 RX ADMIN — PROPOFOL 30 MG: 10 INJECTION, EMULSION INTRAVENOUS at 08:07

## 2024-07-09 RX ADMIN — PROPOFOL 50 MG: 10 INJECTION, EMULSION INTRAVENOUS at 08:05

## 2024-07-09 ASSESSMENT — PAIN SCALES - GENERAL
PAINLEVEL_OUTOF10: 5
PAINLEVEL_OUTOF10: 1

## 2024-07-09 ASSESSMENT — PAIN DESCRIPTION - LOCATION
LOCATION: BACK
LOCATION: BACK

## 2024-07-09 ASSESSMENT — PAIN DESCRIPTION - ONSET: ONSET: ON-GOING

## 2024-07-09 ASSESSMENT — PAIN DESCRIPTION - PAIN TYPE: TYPE: CHRONIC PAIN

## 2024-07-09 ASSESSMENT — PAIN DESCRIPTION - FREQUENCY: FREQUENCY: CONTINUOUS

## 2024-07-09 ASSESSMENT — PAIN DESCRIPTION - ORIENTATION: ORIENTATION: LOWER

## 2024-07-09 NOTE — ANESTHESIA PRE PROCEDURE
Abbe ABDI MD       • sodium chloride flush 0.9 % injection 5-40 mL  5-40 mL IntraVENous 2 times per day Abbe Barrios MD       • sodium chloride flush 0.9 % injection 5-40 mL  5-40 mL IntraVENous PRN Abbe Barrios MD       • 0.9 % sodium chloride infusion   IntraVENous PRN Abbe Barrios MD           Allergies:  No Known Allergies    Problem List:    Patient Active Problem List   Diagnosis Code   • Herniation of intervertebral disc between L5 and S1 M51.27   • Anxiety F41.9   • Herniated lumbar intervertebral disc M51.26       Past Medical History:        Diagnosis Date   • ADHD    • Anxiety    • Arthritis    • Arthritis    • Chronic back pain    • Chronic headaches    • Depression    • Fibromyalgia    • Gastritis    • Hx of degenerative disc disease    • Irritable bowel syndrome    • PTSD (post-traumatic stress disorder)        Past Surgical History:        Procedure Laterality Date   • CHOLECYSTECTOMY     • COLONOSCOPY  2021   • TONSILLECTOMY     • WISDOM TOOTH EXTRACTION         Social History:    Social History     Tobacco Use   • Smoking status: Never   • Smokeless tobacco: Never   Substance Use Topics   • Alcohol use: Yes     Comment: socially 1-2 times per week                                Counseling given: Not Answered      Vital Signs (Current):   Vitals:    07/08/24 1625   Weight: 79.8 kg (176 lb)   Height: 1.549 m (5' 1\")                                              BP Readings from Last 3 Encounters:   06/19/24 124/78   05/21/24 108/88   05/20/24 124/82       NPO Status:                                                                                 BMI:   Wt Readings from Last 3 Encounters:   07/08/24 79.8 kg (176 lb)   06/19/24 79.8 kg (176 lb)   06/11/24 81.2 kg (179 lb)     Body mass index is 33.25 kg/m².    CBC:   Lab Results   Component Value Date/Time    WBC 3.8 03/25/2024 12:05 PM    RBC 4.38 03/25/2024 12:05 PM    HGB 14.0 03/25/2024 12:05 PM    HCT 41.2 03/25/2024 12:05 PM    MCV

## 2024-07-09 NOTE — ANESTHESIA POSTPROCEDURE EVALUATION
Department of Anesthesiology  Postprocedure Note    Patient: Gina Kendrick  MRN: [id number]  YOB: 1990  Date of evaluation: 7/9/2024    Procedure Summary       Date: 07/09/24 Room / Location: [address]    Anesthesia Start: 0800 Anesthesia Stop:     Procedure: Caudal epidural steroid injection (Sacrum) Diagnosis:       Herniated lumbar intervertebral disc      (Herniated lumbar intervertebral disc [M51.26])    Surgeons: Thang Phillips DO Responsible Provider: Abbe Barrios MD    Anesthesia Type: MAC ASA Status: 2            Anesthesia Type: No value filed.    Josh Phase I:      Josh Phase II:      Anesthesia Post Evaluation    Patient location during evaluation: bedside  Patient participation: complete - patient participated  Level of consciousness: awake and awake and alert  Pain score: 1  Airway patency: patent  Nausea & Vomiting: no nausea and no vomiting  Cardiovascular status: blood pressure returned to baseline and hemodynamically stable  Respiratory status: acceptable  Hydration status: euvolemic  Pain management: adequate        No notable events documented.

## 2024-07-09 NOTE — OP NOTE
Caudal Epidural Steroid Injection    Patient Name: Gina Kendrick  : 1990  MRN: [id number]  [place] [facility] [place]   Account: [id number]     Date of Surgery: 2024   Surgeon(s):  Thang Phillips DO    Pre-op Diagnosis: L5-S1 left paracentral disc herniation    Post-Op Diagnosis: Same    Surgical Procedure(s): Caudal epidural steroid injection    Assistants: Physician Assistant: Reyna Rodriguez PA-C.    Anesthesia type/spinal/blocks/exparel: Monitor Anesthesia Care    Estimated blood loss: None    Specimens: None    Drains: None    Implants: None    Complications: None    Disposition: Returned the PACU in stable condition.    HPI/indication for surgery: The patient presents with low back pain and left radicular complaints.  Preoperative MRI demonstrates L5-S1 left paracentral disc herniation.  She elected to proceed with a caudal epidural steroid injection.  The patient failed all nonoperative measures.  This condition is affecting their activities of daily living.  Risks and potential complications were explained to the patient. They understood the risks and potential complications and agreed to proceed.  A surgical consent was obtained and placed on the patient's chart.    DESCRIPTION OF PROCEDURE: The patient was met in the preoperative holding area and the surgeon’s initials were placed upon the operative site. The patient was then taken to the operating room and placed prone on a radiolucent table. Monitored anesthesia care was instituted. The area over the sacrococcygeal region was prepped and draped in the usual sterile fashion. The low back, sacral hiatus and coccyx were used as bony landmarks. An intraoperative xray was utilized to confirm the entry site. A surgical time-out was performed. The sacral hiatus was well demarcated and palpated, followed by introduction of 3 mL of 1% lidocaine without epinephrine, with no blood or CSF identified.  This was then followed by introduction of

## 2024-07-09 NOTE — DISCHARGE INSTRUCTIONS
liquids,  Advance to mild or bland items like Jell-O, dry toast, crackers, etc.,  Avoid caffeine,  Do not drink alcohol for at least 24 hours after surgery,  Your physician may prescribe anti-nausea medication if your nausea continues,  If you are free from nausea for 24hrs, you can advance to your normal diet as tolerated.    OPERATIVE SITE:  A small amount of bleeding or drainage after surgery is normal. Your physician will provide you with specific instructions on how to care for your surgical site and/or dressing.   Try not to touch your surgical site unless necessary,   Always wash your hands BEFORE and AFTER changing your dressing if instructed by your physician,   Proper handwashing includes wetting your hands with clean water, applying soap, lathering your hands by rubbing them together with soap for 20 seconds, rinsing them with clean water, and drying them with a clean towel. If soap and water is not available, alcohol-based  may be applied by rubbing the hands together and allowing them to dry for 20 seconds.  Special Instructions: __________________________________________________________________      PAIN:  Pain after surgery is normal and should be expected. When you go home, the anesthesia wears off, and you may experience increased discomfort. Your provider will give you specific instructions on what pain medication to take at home. Listed below is additional information on treating pain after surgery:  Pain medication can give you an upset stomach. Unless your provider has instructed you not to eat or drink, the pain medication should be taken with a small amount of food. Eating will decrease the chance of an upset stomach.  Pain medication can take about 20-30 minutes to start working, so do not wait until the pain worsens before taking a dose. Remember, always take over-the-counter and prescription drugs only as directed by your provider.  Unless otherwise instructed by your provider,

## 2024-07-10 ENCOUNTER — HOSPITAL ENCOUNTER (EMERGENCY)
Age: 34
Discharge: HOME | End: 2024-07-10
Attending: EMERGENCY MEDICINE
Payer: MEDICAID

## 2024-07-10 VITALS
RESPIRATION RATE: 17 BRPM | HEIGHT: 61 IN | TEMPERATURE: 97.9 F | SYSTOLIC BLOOD PRESSURE: 138 MMHG | DIASTOLIC BLOOD PRESSURE: 95 MMHG | HEART RATE: 77 BPM | BODY MASS INDEX: 33.04 KG/M2 | OXYGEN SATURATION: 99 % | WEIGHT: 175 LBS

## 2024-07-10 DIAGNOSIS — G43.009 MIGRAINE WITHOUT AURA AND WITHOUT STATUS MIGRAINOSUS, NOT INTRACTABLE: Primary | ICD-10-CM

## 2024-07-10 LAB
BACTERIA URNS QL MICRO: ABNORMAL /HPF
BILIRUB UR QL STRIP: NEGATIVE
CLARITY UR: CLEAR
COLOR UR: YELLOW
EPI CELLS #/AREA URNS HPF: ABNORMAL /HPF
GLUCOSE UR STRIP-MCNC: NEGATIVE MG/DL
HCG UR QL: NEGATIVE
HGB UR QL STRIP: ABNORMAL
KETONES UR STRIP-MCNC: NEGATIVE MG/DL
LEUKOCYTE ESTERASE UR QL STRIP: NEGATIVE
NITRITE UR QL STRIP: NEGATIVE
PH UR STRIP: 5.5 [PH] (ref 5–9)
PROT UR STRIP-MCNC: NEGATIVE MG/DL
RBC #/AREA URNS HPF: ABNORMAL /HPF (ref 0–2)
SP GR UR STRIP: >=1.03 (ref 1–1.03)
URINE REFLEX TO CULTURE: ABNORMAL
UROBILINOGEN UR STRIP-ACNC: 0.2 E.U./DL
WBC #/AREA URNS HPF: ABNORMAL /HPF (ref 0–5)

## 2024-07-10 PROCEDURE — 99283 EMERGENCY DEPT VISIT LOW MDM: CPT

## 2024-07-10 PROCEDURE — 2580000003 HC RX 258: Performed by: EMERGENCY MEDICINE

## 2024-07-10 PROCEDURE — 96374 THER/PROPH/DIAG INJ IV PUSH: CPT

## 2024-07-10 PROCEDURE — 6360000002 HC RX W HCPCS: Performed by: EMERGENCY MEDICINE

## 2024-07-10 PROCEDURE — 81001 URINALYSIS AUTO W/SCOPE: CPT

## 2024-07-10 PROCEDURE — 84703 CHORIONIC GONADOTROPIN ASSAY: CPT

## 2024-07-10 PROCEDURE — 96375 TX/PRO/DX INJ NEW DRUG ADDON: CPT

## 2024-07-10 RX ORDER — 0.9 % SODIUM CHLORIDE 0.9 %
1000 INTRAVENOUS SOLUTION INTRAVENOUS ONCE
Status: COMPLETED | OUTPATIENT
Start: 2024-07-10 | End: 2024-07-10

## 2024-07-10 RX ORDER — DEXAMETHASONE SODIUM PHOSPHATE 10 MG/ML
8 INJECTION, SOLUTION INTRAMUSCULAR; INTRAVENOUS ONCE
Status: COMPLETED | OUTPATIENT
Start: 2024-07-10 | End: 2024-07-10

## 2024-07-10 RX ORDER — METOCLOPRAMIDE HYDROCHLORIDE 5 MG/ML
10 INJECTION INTRAMUSCULAR; INTRAVENOUS ONCE
Status: COMPLETED | OUTPATIENT
Start: 2024-07-10 | End: 2024-07-10

## 2024-07-10 RX ORDER — DIPHENHYDRAMINE HYDROCHLORIDE 50 MG/ML
50 INJECTION INTRAMUSCULAR; INTRAVENOUS ONCE
Status: COMPLETED | OUTPATIENT
Start: 2024-07-10 | End: 2024-07-10

## 2024-07-10 RX ADMIN — SODIUM CHLORIDE 1000 ML: 9 INJECTION, SOLUTION INTRAVENOUS at 18:50

## 2024-07-10 RX ADMIN — DIPHENHYDRAMINE HYDROCHLORIDE 50 MG: 50 INJECTION INTRAMUSCULAR; INTRAVENOUS at 18:22

## 2024-07-10 RX ADMIN — DEXAMETHASONE SODIUM PHOSPHATE 8 MG: 10 INJECTION INTRAMUSCULAR; INTRAVENOUS at 18:20

## 2024-07-10 RX ADMIN — METOCLOPRAMIDE HYDROCHLORIDE 10 MG: 5 INJECTION INTRAMUSCULAR; INTRAVENOUS at 18:22

## 2024-07-10 ASSESSMENT — ENCOUNTER SYMPTOMS
PHOTOPHOBIA: 1
CHEST TIGHTNESS: 0
SHORTNESS OF BREATH: 0
VOICE CHANGE: 0
CHOKING: 0
CONSTIPATION: 0
COUGH: 0
WHEEZING: 0
DIARRHEA: 0
BACK PAIN: 1
ABDOMINAL PAIN: 0
BLOOD IN STOOL: 0
EYE DISCHARGE: 0
EYE PAIN: 0
NAUSEA: 1
SINUS PRESSURE: 0
STRIDOR: 0
FACIAL SWELLING: 0
SORE THROAT: 0
VOMITING: 0
TROUBLE SWALLOWING: 0
EYE REDNESS: 0

## 2024-07-10 ASSESSMENT — PAIN SCALES - GENERAL
PAINLEVEL_OUTOF10: 10
PAINLEVEL_OUTOF10: 4

## 2024-07-10 ASSESSMENT — PAIN DESCRIPTION - DESCRIPTORS: DESCRIPTORS: ACHING

## 2024-07-10 ASSESSMENT — PAIN - FUNCTIONAL ASSESSMENT
PAIN_FUNCTIONAL_ASSESSMENT: PREVENTS OR INTERFERES SOME ACTIVE ACTIVITIES AND ADLS
PAIN_FUNCTIONAL_ASSESSMENT: 0-10
PAIN_FUNCTIONAL_ASSESSMENT: 0-10

## 2024-07-10 ASSESSMENT — PAIN DESCRIPTION - PAIN TYPE: TYPE: ACUTE PAIN

## 2024-07-10 ASSESSMENT — LIFESTYLE VARIABLES
HOW MANY STANDARD DRINKS CONTAINING ALCOHOL DO YOU HAVE ON A TYPICAL DAY: PATIENT DOES NOT DRINK
HOW OFTEN DO YOU HAVE A DRINK CONTAINING ALCOHOL: NEVER

## 2024-07-10 ASSESSMENT — PAIN DESCRIPTION - LOCATION: LOCATION: HEAD

## 2024-07-10 NOTE — ED NOTES
Resting in bed feeling better.  Fluids running as ordered.  Aware will discharge when fluids are complete

## 2024-07-10 NOTE — ED PROVIDER NOTES
to 7 for level 4, 8 or more for level 5)     ED Triage Vitals [07/10/24 1736]   BP Temp Temp Source Pulse Respirations SpO2 Height Weight - Scale   (!) 138/95 97.9 °F (36.6 °C) Oral 77 17 99 % 1.549 m (5' 1\") 79.4 kg (175 lb)       Physical Exam  Vitals and nursing note reviewed.   Constitutional:       General: She is not in acute distress.     Appearance: Normal appearance. She is well-developed. She is not ill-appearing, toxic-appearing or diaphoretic.      Comments: Alert cooperative patient slightly high-sensitivity answering all my question appropriately moving all extremities very well   HENT:      Head: Normocephalic and atraumatic.      Right Ear: Tympanic membrane, ear canal and external ear normal.      Left Ear: Tympanic membrane, ear canal and external ear normal.      Mouth/Throat:      Pharynx: No oropharyngeal exudate or posterior oropharyngeal erythema.   Eyes:      General:         Right eye: No discharge.         Left eye: No discharge.      Pupils: Pupils are equal, round, and reactive to light.   Neck:      Vascular: No carotid bruit.   Cardiovascular:      Rate and Rhythm: Normal rate and regular rhythm.      Heart sounds: Normal heart sounds. No murmur heard.     No gallop.   Pulmonary:      Effort: No respiratory distress.      Breath sounds: Normal breath sounds. No stridor. No wheezing or rhonchi.   Chest:      Chest wall: No tenderness.   Abdominal:      General: Bowel sounds are normal.      Palpations: Abdomen is soft. There is no mass.      Tenderness: There is no abdominal tenderness. There is no guarding or rebound.   Musculoskeletal:         General: No tenderness. Normal range of motion.      Cervical back: Normal range of motion and neck supple. No rigidity or tenderness.   Lymphadenopathy:      Cervical: No cervical adenopathy.   Skin:     General: Skin is warm.      Capillary Refill: Capillary refill takes less than 2 seconds.      Coloration: Skin is not jaundiced or pale.

## 2024-07-10 NOTE — ED TRIAGE NOTES
Pt arrives to ED, from home, via personal vehicle.  Pt presents with a headache.  Pt states she has a hx of migraines but had an epidural, yesterday, for bulging discs in her back.

## 2024-07-22 ENCOUNTER — TELEMEDICINE (OUTPATIENT)
Dept: ORTHOPEDIC SURGERY | Age: 34
End: 2024-07-22

## 2024-07-22 DIAGNOSIS — M51.26 HERNIATED LUMBAR INTERVERTEBRAL DISC: Primary | ICD-10-CM

## 2024-07-22 PROCEDURE — 99024 POSTOP FOLLOW-UP VISIT: CPT | Performed by: PHYSICIAN ASSISTANT

## 2024-07-22 NOTE — PROGRESS NOTES
Gina Kendrick, was evaluated through a synchronous (real-time) audio-video encounter. The patient (or guardian if applicable) is aware that this is a billable service, which includes applicable co-pays. This Virtual Visit was conducted with patient's (and/or legal guardian's) consent. Patient identification was verified, and a caregiver was present when appropriate.   The patient was located at Home: [address] [place] [id number]  Provider was located at Facility (Appt Dept): [address] [facility]  [address] [place] [address]  Confirm you are appropriately licensed, registered, or certified to deliver care in the state where the patient is located as indicated above. If you are not or unsure, please re-schedule the visit: Yes, I confirm.     Gina Kendrick (:  1990) is a Established patient, presenting virtually for evaluation of the following:    Assessment & Plan   Below is the assessment and plan developed based on review of pertinent history, physical exam, labs, studies, and medications.  1. Herniated lumbar intervertebral disc    No follow-ups on file.       Subjective   This is a 33-year-old female virtual visit.  She had caudal epidural steroid injection performed by Dr. Thang natarajan 2024.  She states she has noticed improvement in her back pain.  She has a migraine headache and went to the emergency department.  She was given the migraine cocktail and states her headache is improved but not entirely resolved.  She denies bowel or bladder dysfunction.      Review of Systems       Objective   Patient-Reported Vitals  No data recorded     Physical Exam  Musculoskeletal:      Comments: No physical exam     I discussed with the patient that she needs to restart her stretching exercises.  She states her back is sore with a lot of activity as she is just had family in town.  She states the pain does improve with rest and stretching.  I like her to Dany Natarajan or myself next week to

## 2024-09-09 ENCOUNTER — OFFICE VISIT (OUTPATIENT)
Dept: OBGYN CLINIC | Age: 34
End: 2024-09-09
Payer: MEDICAID

## 2024-09-09 VITALS
DIASTOLIC BLOOD PRESSURE: 58 MMHG | SYSTOLIC BLOOD PRESSURE: 112 MMHG | HEIGHT: 61 IN | WEIGHT: 181 LBS | BODY MASS INDEX: 34.17 KG/M2

## 2024-09-09 DIAGNOSIS — Z30.432 ENCOUNTER FOR IUD REMOVAL: Primary | ICD-10-CM

## 2024-09-09 PROCEDURE — 58301 REMOVE INTRAUTERINE DEVICE: CPT | Performed by: OBSTETRICS & GYNECOLOGY

## 2024-11-05 ENCOUNTER — OFFICE VISIT (OUTPATIENT)
Dept: INTERNAL MEDICINE | Age: 34
End: 2024-11-05
Payer: MEDICAID

## 2024-11-05 VITALS
DIASTOLIC BLOOD PRESSURE: 70 MMHG | BODY MASS INDEX: 37.56 KG/M2 | WEIGHT: 198.8 LBS | SYSTOLIC BLOOD PRESSURE: 110 MMHG | TEMPERATURE: 97.3 F | OXYGEN SATURATION: 97 % | HEART RATE: 101 BPM

## 2024-11-05 DIAGNOSIS — M51.27 HERNIATION OF INTERVERTEBRAL DISC BETWEEN L5 AND S1: Primary | ICD-10-CM

## 2024-11-05 PROCEDURE — 99213 OFFICE O/P EST LOW 20 MIN: CPT | Performed by: STUDENT IN AN ORGANIZED HEALTH CARE EDUCATION/TRAINING PROGRAM

## 2024-11-05 RX ORDER — QUETIAPINE FUMARATE 200 MG/1
200 TABLET, FILM COATED ORAL NIGHTLY
COMMUNITY
Start: 2024-08-20

## 2024-11-05 RX ORDER — DULOXETIN HYDROCHLORIDE 30 MG/1
CAPSULE, DELAYED RELEASE ORAL
COMMUNITY
Start: 2024-09-03

## 2024-11-05 RX ORDER — NALTREXONE HYDROCHLORIDE 50 MG/1
50 TABLET, FILM COATED ORAL DAILY
COMMUNITY
Start: 2024-09-03

## 2024-11-05 RX ORDER — PREDNISONE 20 MG/1
20 TABLET ORAL 2 TIMES DAILY
Qty: 10 TABLET | Refills: 0 | Status: SHIPPED | OUTPATIENT
Start: 2024-11-05 | End: 2024-11-10

## 2024-11-05 ASSESSMENT — ENCOUNTER SYMPTOMS
SHORTNESS OF BREATH: 0
BACK PAIN: 1
ABDOMINAL PAIN: 0

## 2024-11-05 NOTE — ASSESSMENT & PLAN NOTE
Acute Exacerbation of chronic problem  Will order 5 days of prednisone and physical therapy as this has helped her in the past  Discussed risks/benefits/side effects/alternatives of medication. Using shared decision making patient (or caregiver) elects to pursue treatment.   Recommend patient follow-up with Dr. Phillips

## 2024-11-05 NOTE — PROGRESS NOTES
[facility] [place] [facility]  [address] [facility] [address]  Dept: [phone]  Dept Fax: [phone]  Loc: [phone]     Visit type: Established patient  Reason for Visit: Back Pain (Lower back, left sided, difficulty walking, recurrent problem)    Assessment/Plan   1. Herniation of intervertebral disc between L5 and S1  Assessment & Plan:  Acute Exacerbation of chronic problem  Will order 5 days of prednisone and physical therapy as this has helped her in the past  Discussed risks/benefits/side effects/alternatives of medication. Using shared decision making patient (or caregiver) elects to pursue treatment.   Recommend patient follow-up with Dr. Phillips    Orders:  -     [facility] [place]  -     predniSONE (DELTASONE) 20 MG tablet; Take 1 tablet by mouth 2 times daily for 5 days, Disp-10 tablet, R-0Normal      Health Maintenance Due   Topic    Varicella vaccine (1 of 2 - 13+ 2-dose series)    HIV screen     Hepatitis C screen     Hepatitis B vaccine (1 of 3 - 19+ 3-dose series)    DTaP/Tdap/Td vaccine (1 - Tdap)    Cervical cancer screen     Flu vaccine (1)    COVID-19 Vaccine (1 - 2023-24 season)           No follow-ups on file.  Subjective   HPI     Lower back pain, left sided  - had epidural injection 4 months ago w/ Dr. Phillips   - now feels it pulling  - difficulty walking  - Physical Therapy  does help w/ traction       No results found for this visit on 11/05/24.    Review of Systems   Constitutional:  Negative for activity change, appetite change, fatigue and fever.   Eyes:  Negative for visual disturbance.   Respiratory:  Negative for shortness of breath.    Cardiovascular:  Negative for chest pain.   Gastrointestinal:  Negative for abdominal pain.   Genitourinary:  Negative for difficulty urinating.   Musculoskeletal:  Positive for back pain.      Objective   /70   Pulse (!) 101   Temp 97.3 °F (36.3 °C) (Infrared)   Wt 90.2 kg (198

## 2024-11-12 ENCOUNTER — OFFICE VISIT (OUTPATIENT)
Dept: FAMILY MEDICINE CLINIC | Age: 34
End: 2024-11-12
Payer: MEDICAID

## 2024-11-12 VITALS
HEIGHT: 61 IN | DIASTOLIC BLOOD PRESSURE: 78 MMHG | SYSTOLIC BLOOD PRESSURE: 130 MMHG | HEART RATE: 74 BPM | OXYGEN SATURATION: 98 % | BODY MASS INDEX: 37.56 KG/M2

## 2024-11-12 DIAGNOSIS — M54.50 CHRONIC BILATERAL LOW BACK PAIN, UNSPECIFIED WHETHER SCIATICA PRESENT: ICD-10-CM

## 2024-11-12 DIAGNOSIS — Z71.3 NUTRITIONAL COUNSELING: ICD-10-CM

## 2024-11-12 DIAGNOSIS — G89.29 CHRONIC BILATERAL LOW BACK PAIN, UNSPECIFIED WHETHER SCIATICA PRESENT: ICD-10-CM

## 2024-11-12 DIAGNOSIS — Z00.00 ANNUAL PHYSICAL EXAM: Primary | ICD-10-CM

## 2024-11-12 PROCEDURE — G8427 DOCREV CUR MEDS BY ELIG CLIN: HCPCS | Performed by: FAMILY MEDICINE

## 2024-11-12 PROCEDURE — 99213 OFFICE O/P EST LOW 20 MIN: CPT | Performed by: FAMILY MEDICINE

## 2024-11-12 PROCEDURE — 1036F TOBACCO NON-USER: CPT | Performed by: FAMILY MEDICINE

## 2024-11-12 PROCEDURE — G8484 FLU IMMUNIZE NO ADMIN: HCPCS | Performed by: FAMILY MEDICINE

## 2024-11-12 PROCEDURE — G8417 CALC BMI ABV UP PARAM F/U: HCPCS | Performed by: FAMILY MEDICINE

## 2024-11-12 PROCEDURE — 99395 PREV VISIT EST AGE 18-39: CPT | Performed by: FAMILY MEDICINE

## 2024-11-12 RX ORDER — BACLOFEN 10 MG/1
10 TABLET ORAL 2 TIMES DAILY PRN
Qty: 60 TABLET | Refills: 1 | Status: SHIPPED | OUTPATIENT
Start: 2024-11-12

## 2024-11-12 RX ORDER — PHENTERMINE HYDROCHLORIDE 37.5 MG/1
37.5 TABLET ORAL DAILY
Qty: 30 TABLET | Refills: 0 | Status: SHIPPED | OUTPATIENT
Start: 2024-11-12 | End: 2024-12-12

## 2024-11-12 NOTE — PROGRESS NOTES
[facility]  [address] [place] [id number]  Dept: [phone]  Dept Fax: [phone]     Chief Complaint:  Chief Complaint   Patient presents with    Annual Exam     Patient is fasting today.       Vitals:    11/12/24 0907   BP: 130/78   Pulse: 74   SpO2: 98%   Height: 1.549 m (5' 1\")       HPI:  34 y.o.female who presents for the following:      Ear problem: recent b/l ear discomfort R>L;     Chronic back pain improved with baclofen; sees spine clinic and gets injections; currently on prednisone    Wt management: Body mass index is 37.56 kg/m². 198 lbs    Wants to have another baby but gained much weight; also doesn't want to worsen her back pain    Planning intermittent fasting; starting walking    Lunch: hard boiled eggs; salad with ranch/cheese  Dinner: chicken or fish  Snacks: before bed chocolate  Drinks: water; black coffee  Activity: walking     -----------------------------------------------------------------------------    Assessment/Plan:  34 y.o. female here mainly for the following:  Annual  routine labs and screenings; she requests to check TSH  LBP  controlled; will continue on current regimen   Wt management  Discussed the many options; agreed on starting month 1 adipex  Discussed lifestyle changes        ICD-10-CM    1. Annual physical exam  Z00.00 Lipid, Fasting     Comprehensive Metabolic Panel, Fasting     Hemoglobin A1C     TSH with Reflex      2. Chronic bilateral low back pain, unspecified whether sciatica present  M54.50 baclofen (LIORESAL) 10 MG tablet    G89.29       3. Nutritional counseling  Z71.3 phentermine (ADIPEX-P) 37.5 MG tablet          Return in about 1 month (around 12/12/2024) for weight management.    Ken Stockton MD      -----------------------------------------------------------------------------      Objective     Physical Exam:  Physical Exam      Review of systems as noted in HPI    Past Medical History:   Diagnosis Date    ADHD

## 2024-12-24 ENCOUNTER — TELEPHONE (OUTPATIENT)
Dept: FAMILY MEDICINE CLINIC | Age: 34
End: 2024-12-24

## 2024-12-24 ENCOUNTER — OFFICE VISIT (OUTPATIENT)
Dept: FAMILY MEDICINE CLINIC | Age: 34
End: 2024-12-24
Payer: MEDICAID

## 2024-12-24 VITALS
TEMPERATURE: 97.2 F | HEIGHT: 61 IN | HEART RATE: 98 BPM | WEIGHT: 199 LBS | OXYGEN SATURATION: 98 % | SYSTOLIC BLOOD PRESSURE: 122 MMHG | BODY MASS INDEX: 37.57 KG/M2 | DIASTOLIC BLOOD PRESSURE: 80 MMHG

## 2024-12-24 DIAGNOSIS — M51.26 HERNIATED LUMBAR INTERVERTEBRAL DISC: ICD-10-CM

## 2024-12-24 DIAGNOSIS — L70.0 CYSTIC ACNE VULGARIS: ICD-10-CM

## 2024-12-24 DIAGNOSIS — Z71.3 NUTRITIONAL COUNSELING: Primary | ICD-10-CM

## 2024-12-24 PROCEDURE — G8417 CALC BMI ABV UP PARAM F/U: HCPCS | Performed by: FAMILY MEDICINE

## 2024-12-24 PROCEDURE — 99214 OFFICE O/P EST MOD 30 MIN: CPT | Performed by: FAMILY MEDICINE

## 2024-12-24 PROCEDURE — G8427 DOCREV CUR MEDS BY ELIG CLIN: HCPCS | Performed by: FAMILY MEDICINE

## 2024-12-24 PROCEDURE — 1036F TOBACCO NON-USER: CPT | Performed by: FAMILY MEDICINE

## 2024-12-24 PROCEDURE — G8484 FLU IMMUNIZE NO ADMIN: HCPCS | Performed by: FAMILY MEDICINE

## 2024-12-24 RX ORDER — TRETINOIN 0.25 MG/G
GEL TOPICAL
Qty: 45 G | Refills: 3 | Status: SHIPPED | OUTPATIENT
Start: 2024-12-24

## 2024-12-24 RX ORDER — TOPIRAMATE 50 MG/1
50 TABLET, FILM COATED ORAL DAILY
Qty: 30 TABLET | Refills: 0 | Status: SHIPPED | OUTPATIENT
Start: 2024-12-24

## 2024-12-24 RX ORDER — MELOXICAM 15 MG/1
15 TABLET ORAL DAILY PRN
Qty: 30 TABLET | Refills: 2 | Status: SHIPPED | OUTPATIENT
Start: 2024-12-24

## 2024-12-24 RX ORDER — PHENTERMINE HYDROCHLORIDE 37.5 MG/1
37.5 TABLET ORAL DAILY
Qty: 30 TABLET | Refills: 0 | Status: SHIPPED | OUTPATIENT
Start: 2024-12-24 | End: 2025-01-23

## 2024-12-24 RX ORDER — PREDNISONE 20 MG/1
40 TABLET ORAL DAILY
Qty: 10 TABLET | Refills: 0 | Status: SHIPPED | OUTPATIENT
Start: 2024-12-24 | End: 2024-12-29

## 2024-12-24 RX ORDER — DIAZEPAM 5 MG/1
TABLET ORAL
COMMUNITY
Start: 2024-12-16

## 2024-12-24 RX ORDER — TRETINOIN 0.25 MG/G
CREAM TOPICAL
Qty: 60 G | Refills: 2 | Status: SHIPPED | OUTPATIENT
Start: 2024-12-24 | End: 2024-12-24

## 2024-12-24 NOTE — TELEPHONE ENCOUNTER
Patient wants to know if she can take prednisone with the meloxicam? If she can, can this also be sent over to the pharmacy as well?

## 2024-12-24 NOTE — TELEPHONE ENCOUNTER
The prednisone and meloxicam together can be rough on the stomach. I've sent in prednisone. When you finish it, you can start he as-needed meloxicam.

## 2024-12-24 NOTE — PROGRESS NOTES
0.025 % cream      3. Herniated lumbar intervertebral disc  M51.26 meloxicam (MOBIC) 15 MG tablet     predniSONE (DELTASONE) 20 MG tablet          Return if symptoms worsen or fail to improve.    Ken Stockton MD      -----------------------------------------------------------------------------      Objective     Physical Exam:  Physical Exam  Vitals reviewed.   Constitutional:       General: She is not in acute distress.     Appearance: She is well-developed.   HENT:      Head: Normocephalic and atraumatic.   Cardiovascular:      Rate and Rhythm: Normal rate.   Pulmonary:      Effort: Pulmonary effort is normal. No respiratory distress.   Musculoskeletal:      Cervical back: Normal range of motion.   Skin:     General: Skin is warm and dry.   Neurological:      Mental Status: She is alert and oriented to person, place, and time.   Psychiatric:         Attention and Perception: Attention normal.         Behavior: Behavior normal.           Review of systems as noted in HPI    Past Medical History:   Diagnosis Date    ADHD     Anxiety     Arthritis     Arthritis     Chronic back pain     Chronic headaches     Depression     Fibromyalgia     Gastritis     HPV in female     Hx of degenerative disc disease     Irritable bowel syndrome     PTSD (post-traumatic stress disorder)      Past Surgical History:   Procedure Laterality Date    CHOLECYSTECTOMY      COLONOSCOPY  2021    PAIN MANAGEMENT PROCEDURE N/A 7/9/2024    Caudal epidural steroid injection performed by Thang Phillips DO [facility]    TONSILLECTOMY      WISDOM TOOTH EXTRACTION       Social History     Socioeconomic History    Marital status: Single     Spouse name: Not on file    Number of children: Not on file    Years of education: Not on file    Highest education level: Not on file   Occupational History    Not on file   Tobacco Use    Smoking status: Never    Smokeless tobacco: Never   Vaping Use    Vaping status: Former    Substances: Nicotine   Substance

## 2025-01-02 DIAGNOSIS — M51.26 HERNIATED LUMBAR INTERVERTEBRAL DISC: ICD-10-CM

## 2025-01-02 RX ORDER — PREDNISONE 20 MG/1
40 TABLET ORAL DAILY
Qty: 10 TABLET | Refills: 0 | Status: SHIPPED | OUTPATIENT
Start: 2025-01-02 | End: 2025-01-07

## 2025-01-02 NOTE — TELEPHONE ENCOUNTER
Comments:     Last Office Visit (last PCP visit):   12/24/2024    Next Visit Date:  No future appointments.    **If hasn't been seen in over a year OR hasn't followed up according to last diabetes/ADHD visit, make appointment for patient before sending refill to provider.    Rx requested:  Requested Prescriptions     Pending Prescriptions Disp Refills    predniSONE (DELTASONE) 20 MG tablet [Pharmacy Med Name: prednisone 20 mg tablet] 10 tablet 0     Sig: Take 2 tablets by mouth daily for 5 days

## 2025-01-06 ENCOUNTER — HOSPITAL ENCOUNTER (EMERGENCY)
Age: 35
Discharge: HOME | End: 2025-01-06
Attending: EMERGENCY MEDICINE
Payer: MEDICAID

## 2025-01-06 VITALS
RESPIRATION RATE: 15 BRPM | BODY MASS INDEX: 39.27 KG/M2 | HEIGHT: 60 IN | DIASTOLIC BLOOD PRESSURE: 89 MMHG | OXYGEN SATURATION: 98 % | WEIGHT: 200 LBS | HEART RATE: 99 BPM | TEMPERATURE: 98.3 F | SYSTOLIC BLOOD PRESSURE: 125 MMHG

## 2025-01-06 DIAGNOSIS — S61.217A LACERATION OF LEFT LITTLE FINGER WITHOUT FOREIGN BODY WITHOUT DAMAGE TO NAIL, INITIAL ENCOUNTER: Primary | ICD-10-CM

## 2025-01-06 PROCEDURE — 99284 EMERGENCY DEPT VISIT MOD MDM: CPT

## 2025-01-06 PROCEDURE — 90715 TDAP VACCINE 7 YRS/> IM: CPT | Performed by: EMERGENCY MEDICINE

## 2025-01-06 PROCEDURE — 6370000000 HC RX 637 (ALT 250 FOR IP): Performed by: EMERGENCY MEDICINE

## 2025-01-06 PROCEDURE — 6360000002 HC RX W HCPCS: Performed by: EMERGENCY MEDICINE

## 2025-01-06 PROCEDURE — 90471 IMMUNIZATION ADMIN: CPT | Performed by: EMERGENCY MEDICINE

## 2025-01-06 RX ORDER — ACETAMINOPHEN 500 MG
1000 TABLET ORAL
Status: COMPLETED | OUTPATIENT
Start: 2025-01-06 | End: 2025-01-06

## 2025-01-06 RX ADMIN — Medication 1 EACH: at 21:36

## 2025-01-06 RX ADMIN — ACETAMINOPHEN 1000 MG: 500 TABLET ORAL at 21:35

## 2025-01-06 RX ADMIN — TETANUS TOXOID, REDUCED DIPHTHERIA TOXOID AND ACELLULAR PERTUSSIS VACCINE, ADSORBED 0.5 ML: 5; 2.5; 8; 8; 2.5 SUSPENSION INTRAMUSCULAR at 21:36

## 2025-01-06 ASSESSMENT — PAIN DESCRIPTION - FREQUENCY: FREQUENCY: CONTINUOUS

## 2025-01-06 ASSESSMENT — ENCOUNTER SYMPTOMS
COLOR CHANGE: 0
ABDOMINAL PAIN: 0
EYE PAIN: 0
SHORTNESS OF BREATH: 0
DIARRHEA: 0
RHINORRHEA: 0
NAUSEA: 0
COUGH: 0
WHEEZING: 0
PHOTOPHOBIA: 0
VOMITING: 0
SORE THROAT: 0
ABDOMINAL DISTENTION: 0
BACK PAIN: 0
APNEA: 0
SINUS PRESSURE: 0
CONSTIPATION: 0

## 2025-01-06 ASSESSMENT — PAIN DESCRIPTION - LOCATION: LOCATION: FINGER (COMMENT WHICH ONE)

## 2025-01-06 ASSESSMENT — LIFESTYLE VARIABLES
HOW OFTEN DO YOU HAVE A DRINK CONTAINING ALCOHOL: 2-3 TIMES A WEEK
HOW MANY STANDARD DRINKS CONTAINING ALCOHOL DO YOU HAVE ON A TYPICAL DAY: 1 OR 2

## 2025-01-06 ASSESSMENT — PAIN SCALES - GENERAL
PAINLEVEL_OUTOF10: 10
PAINLEVEL_OUTOF10: 0

## 2025-01-06 ASSESSMENT — PAIN DESCRIPTION - ONSET: ONSET: SUDDEN

## 2025-01-06 ASSESSMENT — PAIN DESCRIPTION - DESCRIPTORS: DESCRIPTORS: SHARP

## 2025-01-06 ASSESSMENT — PAIN DESCRIPTION - PAIN TYPE: TYPE: ACUTE PAIN

## 2025-01-06 ASSESSMENT — PAIN - FUNCTIONAL ASSESSMENT: PAIN_FUNCTIONAL_ASSESSMENT: 0-10

## 2025-01-07 NOTE — ED TRIAGE NOTES
Patient states she was peeling an apple and accidentally cut the top of the left pinky finger. Patient arrives with bandage over finger.

## 2025-01-07 NOTE — ED PROVIDER NOTES
[facility] EMERGENCY DEPARTMENT  eMERGENCY dEPARTMENT eNCOUnter      Pt Name: Gina Kendrick  MRN: [id number]  Birthdate 1990  Date of evaluation: 1/6/2025  Provider: Jewel Gandhi MD    CHIEF COMPLAINT       Chief Complaint   Patient presents with    Laceration     Left small finger laceration.         HISTORY OF PRESENT ILLNESS   (Location/Symptom, Timing/Onset,Context/Setting, Quality, Duration, Modifying Factors, Severity)  Note limiting factors.   Gina Kendrick is a 34 y.o. female who presents to the emergency department with complaint of avulsion of tip of left little finger.  Patient sustained accidental injury while peeling apples.  Wound bled for 2 hours and she decided to come to the ED.  Last tetanus toxoid has been more than 9 years.  Pain is 2 in a scale of 1-10.  It is sharp and does not radiate.  Denies any other systemic symptoms.    HPI    Nursing Notes were reviewed.    REVIEW OF SYSTEMS    (2-9 systems for level 4, 10 or more for level 5)     Review of Systems   Constitutional: Negative.  Negative for activity change, appetite change, chills, fatigue and fever.   HENT:  Negative for congestion, ear discharge, ear pain, hearing loss, rhinorrhea, sinus pressure and sore throat.    Eyes:  Negative for photophobia, pain and visual disturbance.   Respiratory:  Negative for apnea, cough, shortness of breath and wheezing.    Cardiovascular:  Negative for chest pain, palpitations and leg swelling.   Gastrointestinal:  Negative for abdominal distention, abdominal pain, constipation, diarrhea, nausea and vomiting.   Endocrine: Negative for cold intolerance, heat intolerance and polyuria.   Genitourinary:  Negative for dysuria, flank pain, frequency and urgency.   Musculoskeletal:  Negative for arthralgias, back pain, gait problem, myalgias and neck stiffness.   Skin:  Positive for wound. Negative for color change, pallor and rash.   Allergic/Immunologic: Negative for food allergies and

## 2025-01-07 NOTE — ED NOTES
Patient's wound cleaned with Hibiclens and dressed with gel sponge, bandage and kerlix. Patient's dressing clean dry and intact.

## 2025-01-13 DIAGNOSIS — M54.50 CHRONIC BILATERAL LOW BACK PAIN, UNSPECIFIED WHETHER SCIATICA PRESENT: ICD-10-CM

## 2025-01-13 DIAGNOSIS — G89.29 CHRONIC BILATERAL LOW BACK PAIN, UNSPECIFIED WHETHER SCIATICA PRESENT: ICD-10-CM

## 2025-01-13 RX ORDER — BACLOFEN 10 MG/1
10 TABLET ORAL 2 TIMES DAILY PRN
Qty: 60 TABLET | Refills: 1 | Status: SHIPPED | OUTPATIENT
Start: 2025-01-13

## 2025-01-13 NOTE — TELEPHONE ENCOUNTER
Comments:     Last Office Visit (last PCP visit):   12/24/2024    Next Visit Date:  No future appointments.    **If hasn't been seen in over a year OR hasn't followed up according to last diabetes/ADHD visit, make appointment for patient before sending refill to provider.    Rx requested:  Requested Prescriptions     Pending Prescriptions Disp Refills    baclofen (LIORESAL) 10 MG tablet [Pharmacy Med Name: baclofen 10 mg tablet] 60 tablet 1     Sig: Take 1 tablet by mouth 2 times daily as needed (back spasms)

## 2025-01-20 ENCOUNTER — OFFICE VISIT (OUTPATIENT)
Dept: FAMILY MEDICINE CLINIC | Age: 35
End: 2025-01-20
Payer: MEDICAID

## 2025-01-20 VITALS
WEIGHT: 197.8 LBS | SYSTOLIC BLOOD PRESSURE: 114 MMHG | TEMPERATURE: 97.2 F | BODY MASS INDEX: 38.83 KG/M2 | OXYGEN SATURATION: 98 % | HEART RATE: 83 BPM | DIASTOLIC BLOOD PRESSURE: 70 MMHG | HEIGHT: 60 IN

## 2025-01-20 DIAGNOSIS — Z71.3 NUTRITIONAL COUNSELING: Primary | ICD-10-CM

## 2025-01-20 PROCEDURE — G8427 DOCREV CUR MEDS BY ELIG CLIN: HCPCS | Performed by: FAMILY MEDICINE

## 2025-01-20 PROCEDURE — 1036F TOBACCO NON-USER: CPT | Performed by: FAMILY MEDICINE

## 2025-01-20 PROCEDURE — 99213 OFFICE O/P EST LOW 20 MIN: CPT | Performed by: FAMILY MEDICINE

## 2025-01-20 PROCEDURE — G8417 CALC BMI ABV UP PARAM F/U: HCPCS | Performed by: FAMILY MEDICINE

## 2025-01-20 RX ORDER — ONDANSETRON 4 MG/1
TABLET, ORALLY DISINTEGRATING ORAL
COMMUNITY
Start: 2025-01-15

## 2025-01-20 RX ORDER — TOPIRAMATE 50 MG/1
50 TABLET, FILM COATED ORAL DAILY
Qty: 30 TABLET | Refills: 0 | Status: SHIPPED | OUTPATIENT
Start: 2025-01-20

## 2025-01-20 RX ORDER — ESKETAMINE HYDROCHLORIDE 28 MG/.2ML
SOLUTION NASAL
COMMUNITY
Start: 2025-01-15

## 2025-01-20 RX ORDER — LORAZEPAM 1 MG/1
TABLET ORAL
COMMUNITY
Start: 2025-01-15

## 2025-01-20 SDOH — ECONOMIC STABILITY: FOOD INSECURITY: WITHIN THE PAST 12 MONTHS, THE FOOD YOU BOUGHT JUST DIDN'T LAST AND YOU DIDN'T HAVE MONEY TO GET MORE.: NEVER TRUE

## 2025-01-20 SDOH — ECONOMIC STABILITY: FOOD INSECURITY: WITHIN THE PAST 12 MONTHS, YOU WORRIED THAT YOUR FOOD WOULD RUN OUT BEFORE YOU GOT MONEY TO BUY MORE.: NEVER TRUE

## 2025-01-20 ASSESSMENT — PATIENT HEALTH QUESTIONNAIRE - PHQ9
7. TROUBLE CONCENTRATING ON THINGS, SUCH AS READING THE NEWSPAPER OR WATCHING TELEVISION: NOT AT ALL
SUM OF ALL RESPONSES TO PHQ QUESTIONS 1-9: 0
1. LITTLE INTEREST OR PLEASURE IN DOING THINGS: NOT AT ALL
10. IF YOU CHECKED OFF ANY PROBLEMS, HOW DIFFICULT HAVE THESE PROBLEMS MADE IT FOR YOU TO DO YOUR WORK, TAKE CARE OF THINGS AT HOME, OR GET ALONG WITH OTHER PEOPLE: NOT DIFFICULT AT ALL
2. FEELING DOWN, DEPRESSED OR HOPELESS: NOT AT ALL
5. POOR APPETITE OR OVEREATING: NOT AT ALL
8. MOVING OR SPEAKING SO SLOWLY THAT OTHER PEOPLE COULD HAVE NOTICED. OR THE OPPOSITE, BEING SO FIGETY OR RESTLESS THAT YOU HAVE BEEN MOVING AROUND A LOT MORE THAN USUAL: NOT AT ALL
SUM OF ALL RESPONSES TO PHQ QUESTIONS 1-9: 0
6. FEELING BAD ABOUT YOURSELF - OR THAT YOU ARE A FAILURE OR HAVE LET YOURSELF OR YOUR FAMILY DOWN: NOT AT ALL
SUM OF ALL RESPONSES TO PHQ QUESTIONS 1-9: 0
9. THOUGHTS THAT YOU WOULD BE BETTER OFF DEAD, OR OF HURTING YOURSELF: NOT AT ALL
4. FEELING TIRED OR HAVING LITTLE ENERGY: NOT AT ALL
SUM OF ALL RESPONSES TO PHQ9 QUESTIONS 1 & 2: 0
SUM OF ALL RESPONSES TO PHQ QUESTIONS 1-9: 0
3. TROUBLE FALLING OR STAYING ASLEEP: NOT AT ALL

## 2025-01-20 NOTE — PROGRESS NOTES
[facility] PRIMARY CARE  [address] [place] [address]  Dept: [phone]  Dept Fax: [phone]     Chief Complaint:  Chief Complaint   Patient presents with    Nutrition Counseling    Discuss Medications     From psych provider       Vitals:    01/20/25 0849   BP: 114/70   Pulse: 83   Temp: 97.2 °F (36.2 °C)   TempSrc: Infrared   SpO2: 98%   Weight: 89.7 kg (197 lb 12.8 oz)   Height: 1.524 m (5')       HPI:  34 y.o.female who presents for the following:      Wt management: Body mass index is 37.56 kg/m². 198 lbs  - Wt 199 lbs after month 1 adipex  - Wt 197 lbs after month 2 adipex/topamax     Wants to have another baby but gained much weight; also doesn't want to worsen her back pain     Planning intermittent fasting; starting walking; thinks she binges at night too often     Lunch: hard boiled eggs; salad with ranch/cheese  Dinner: chicken or fish  Snacks: before bed chocolate  Drinks: water; black coffee sometimes with cream  Activity: walking     -----------------------------------------------------------------------------    Assessment/Plan:  34 y.o. female here mainly for the following:  Wt management  No progress after 2 months; she feels it was a terrific month; after more discussion it sounds like she mainly likes the phentermine for help with her focus and energy and prefers to stay on it; she is already seeing a psych provider and I am not sure her mood and though process are quite controlled; I encouraged her to f/u with them on her ADHD concerns  Will stop the adipex and she will continue the topamax for another month; after she has a better plan and better able to stick to it, we can still consider adipex again        ICD-10-CM    1. Nutritional counseling  Z71.3 topiramate (TOPAMAX) 50 MG tablet          Return if symptoms worsen or fail to improve.    Ken Stockton, MD      -----------------------------------------------------------------------------      Objective

## 2025-01-29 ENCOUNTER — OFFICE VISIT (OUTPATIENT)
Dept: FAMILY MEDICINE CLINIC | Age: 35
End: 2025-01-29
Payer: MEDICAID

## 2025-01-29 ENCOUNTER — TELEPHONE (OUTPATIENT)
Dept: FAMILY MEDICINE CLINIC | Age: 35
End: 2025-01-29

## 2025-01-29 VITALS
DIASTOLIC BLOOD PRESSURE: 76 MMHG | HEART RATE: 110 BPM | OXYGEN SATURATION: 98 % | HEIGHT: 60 IN | BODY MASS INDEX: 37.69 KG/M2 | WEIGHT: 192 LBS | SYSTOLIC BLOOD PRESSURE: 110 MMHG

## 2025-01-29 DIAGNOSIS — F39 MOOD DISORDER (HCC): Primary | ICD-10-CM

## 2025-01-29 DIAGNOSIS — L29.9 EAR ITCHING: ICD-10-CM

## 2025-01-29 DIAGNOSIS — F41.9 ANXIETY: ICD-10-CM

## 2025-01-29 PROCEDURE — 1036F TOBACCO NON-USER: CPT | Performed by: FAMILY MEDICINE

## 2025-01-29 PROCEDURE — G8417 CALC BMI ABV UP PARAM F/U: HCPCS | Performed by: FAMILY MEDICINE

## 2025-01-29 PROCEDURE — G8427 DOCREV CUR MEDS BY ELIG CLIN: HCPCS | Performed by: FAMILY MEDICINE

## 2025-01-29 PROCEDURE — 99215 OFFICE O/P EST HI 40 MIN: CPT | Performed by: FAMILY MEDICINE

## 2025-01-29 NOTE — TELEPHONE ENCOUNTER
Please send discharge letter to patient. She is welcome to see a new provider at a different clinic.

## 2025-01-29 NOTE — PROGRESS NOTES
Year: Never true     Ran Out of Food in the Last Year: Never true   Transportation Needs: No Transportation Needs (1/20/2025)    PRAPARE - Transportation     Lack of Transportation (Medical): No     Lack of Transportation (Non-Medical): No   Physical Activity: Not on file   Stress: Not on file   Social Connections: Not on file   Intimate Partner Violence: Not on file   Housing Stability: Low Risk  (1/20/2025)    Housing Stability Vital Sign     Unable to Pay for Housing in the Last Year: No     Number of Times Moved in the Last Year: 0     Homeless in the Last Year: No     Family History   Problem Relation Age of Onset    High Blood Pressure Mother     Arthritis Mother     Diabetes Mother     Stroke Mother     Arthritis Father     High Blood Pressure Father       No Known Allergies  Current Outpatient Medications   Medication Sig Dispense Refill    SPRAVATO, 84 MG DOSE, 28 MG/DEVICE SOPK nasal solution       ondansetron (ZOFRAN-ODT) 4 MG disintegrating tablet Takes on Tues and Thurs with Spravato treatment.      baclofen (LIORESAL) 10 MG tablet Take 1 tablet by mouth 2 times daily as needed (back spasms) 60 tablet 1    diazePAM (VALIUM) 5 MG tablet take 1 tablet by mouth once a day as needed for anxiety      tretinoin (RETIN-A) 0.025 % gel Apply topically nightly. 45 g 3    meloxicam (MOBIC) 15 MG tablet Take 1 tablet by mouth daily as needed for Pain 30 tablet 2    DULoxetine (CYMBALTA) 30 MG extended release capsule Take 1 capsule daily with 60mg capsule for total of 90mg daily      QUEtiapine (SEROQUEL) 200 MG tablet Take 1 tablet by mouth nightly      gabapentin (NEURONTIN) 600 MG tablet Take 1 tablet by mouth in the morning and 1 tablet in the evening.      DULoxetine (CYMBALTA) 60 MG extended release capsule Take 1 capsule by mouth daily       No current facility-administered medications for this visit.          -----------------------------------------------------------------------------

## 2025-02-10 ENCOUNTER — HOSPITAL ENCOUNTER (OUTPATIENT)
Dept: ORTHOPEDIC SURGERY | Age: 35
Discharge: HOME | End: 2025-02-12
Payer: MEDICAID

## 2025-02-10 ENCOUNTER — OFFICE VISIT (OUTPATIENT)
Dept: ORTHOPEDIC SURGERY | Age: 35
End: 2025-02-10
Payer: MEDICAID

## 2025-02-10 VITALS
WEIGHT: 192 LBS | OXYGEN SATURATION: 96 % | BODY MASS INDEX: 37.69 KG/M2 | HEIGHT: 60 IN | DIASTOLIC BLOOD PRESSURE: 70 MMHG | HEART RATE: 107 BPM | TEMPERATURE: 96.8 F | SYSTOLIC BLOOD PRESSURE: 116 MMHG

## 2025-02-10 DIAGNOSIS — R52 PAIN: Primary | ICD-10-CM

## 2025-02-10 DIAGNOSIS — M51.26 HERNIATED LUMBAR INTERVERTEBRAL DISC: ICD-10-CM

## 2025-02-10 DIAGNOSIS — R52 PAIN: ICD-10-CM

## 2025-02-10 PROCEDURE — 1036F TOBACCO NON-USER: CPT | Performed by: ORTHOPAEDIC SURGERY

## 2025-02-10 PROCEDURE — 99214 OFFICE O/P EST MOD 30 MIN: CPT | Performed by: ORTHOPAEDIC SURGERY

## 2025-02-10 PROCEDURE — 72110 X-RAY EXAM L-2 SPINE 4/>VWS: CPT

## 2025-02-10 PROCEDURE — G8417 CALC BMI ABV UP PARAM F/U: HCPCS | Performed by: ORTHOPAEDIC SURGERY

## 2025-02-10 PROCEDURE — 72110 X-RAY EXAM L-2 SPINE 4/>VWS: CPT | Performed by: ORTHOPAEDIC SURGERY

## 2025-02-10 PROCEDURE — G8427 DOCREV CUR MEDS BY ELIG CLIN: HCPCS | Performed by: ORTHOPAEDIC SURGERY

## 2025-02-10 NOTE — PROGRESS NOTES
Subjective:      Patient ID: Gina Kendrick is a 34 y.o. female who presents today for:  Chief Complaint   Patient presents with    Lower Back Pain     Patient presents to clinic for Low Back Pain  Symptoms: Sharp  Pain Level: 6/10         Subjective/Objective/Assessment/Plan:     SUBJECTIVE -the patient presents with low back pain and left-sided radicular complaints down the back of her thigh to her foot.  She does not know when this began.  She thinks that has happened for quite some time.    OBJECTIVE - The patient can rise up on their toes and rise up on her heels.  5 out of 5 hip flexion and knee extension strength bilaterally.  Sensation intact bilaterally in the lower extremities from L2-S1.      XR LUMBAR SPINE (MIN 4 VIEWS)  4 views lumbar spine.  Mild degenerative disc disease L5-S1.  No   scoliosis.  No spondylolisthesis.  Unchanged from x-rays 1 year ago.      ASSESSMENT -    Diagnosis Orders   1. Pain  XR LUMBAR SPINE (MIN 4 VIEWS)      2. Herniated lumbar intervertebral disc                PLAN -I talked extensively about the nonoperative and operative treatment interventions.  We discussed anti-inflammatories.  She is smoking marijuana and using a vape pen.  She is taking baclofen.  She would like to get pregnant.  I talked about a caudal epidural steroid injection versus laminectomy and discectomy on the left side.  She does not report any foot drop or bowel or bladder complaints consistent with a cauda equina syndrome.  She will think about the information that I have given her.  She will let us know how she wants to proceed.  She has participated in at least 6 weeks of formalized physical therapy and is failed.  She also has a new symptom of neck pain.  We will get an MRI of her neck.  We are trying to get her scheduled for the caudal epidural steroid injection.  New symptoms today of midthoracic back pain we will get an MRI of that as well.  MRI of her cervical spine was reviewed showing no areas

## 2025-02-24 ENCOUNTER — OFFICE VISIT (OUTPATIENT)
Dept: INTERNAL MEDICINE | Age: 35
End: 2025-02-24
Payer: MEDICAID

## 2025-02-24 VITALS
WEIGHT: 204 LBS | TEMPERATURE: 97.1 F | BODY MASS INDEX: 38.51 KG/M2 | DIASTOLIC BLOOD PRESSURE: 72 MMHG | HEART RATE: 85 BPM | SYSTOLIC BLOOD PRESSURE: 108 MMHG | OXYGEN SATURATION: 96 % | HEIGHT: 61 IN

## 2025-02-24 DIAGNOSIS — L03.039 PARONYCHIA OF GREAT TOE: Primary | ICD-10-CM

## 2025-02-24 DIAGNOSIS — G43.809 OTHER MIGRAINE WITHOUT STATUS MIGRAINOSUS, NOT INTRACTABLE: ICD-10-CM

## 2025-02-24 PROCEDURE — G8427 DOCREV CUR MEDS BY ELIG CLIN: HCPCS | Performed by: STUDENT IN AN ORGANIZED HEALTH CARE EDUCATION/TRAINING PROGRAM

## 2025-02-24 PROCEDURE — 99213 OFFICE O/P EST LOW 20 MIN: CPT | Performed by: STUDENT IN AN ORGANIZED HEALTH CARE EDUCATION/TRAINING PROGRAM

## 2025-02-24 PROCEDURE — G8417 CALC BMI ABV UP PARAM F/U: HCPCS | Performed by: STUDENT IN AN ORGANIZED HEALTH CARE EDUCATION/TRAINING PROGRAM

## 2025-02-24 PROCEDURE — 1036F TOBACCO NON-USER: CPT | Performed by: STUDENT IN AN ORGANIZED HEALTH CARE EDUCATION/TRAINING PROGRAM

## 2025-02-24 RX ORDER — CHLORHEXIDINE GLUCONATE 2 G/100ML
1 SOLUTION TOPICAL DAILY
Qty: 118 ML | Refills: 0 | Status: SHIPPED | OUTPATIENT
Start: 2025-02-24 | End: 2025-03-01

## 2025-02-24 RX ORDER — KETOROLAC TROMETHAMINE 10 MG/1
10 TABLET, FILM COATED ORAL EVERY 6 HOURS PRN
Qty: 20 TABLET | Refills: 0 | Status: SHIPPED | OUTPATIENT
Start: 2025-02-24 | End: 2025-03-01

## 2025-02-24 RX ORDER — ONDANSETRON 4 MG/1
4 TABLET, ORALLY DISINTEGRATING ORAL EVERY 8 HOURS PRN
Qty: 21 TABLET | Refills: 0 | Status: SHIPPED | OUTPATIENT
Start: 2025-02-24 | End: 2025-03-03

## 2025-02-24 RX ORDER — MUPIROCIN 20 MG/G
OINTMENT TOPICAL
Qty: 1 G | Refills: 0 | Status: SHIPPED | OUTPATIENT
Start: 2025-02-24 | End: 2025-03-03

## 2025-02-24 RX ORDER — LISDEXAMFETAMINE DIMESYLATE 20 MG/1
20 CAPSULE ORAL DAILY
COMMUNITY
Start: 2025-02-20

## 2025-02-24 NOTE — PROGRESS NOTES
[facility] Internal Medicine  [facility] Primary Care   [address] [place] [address]   P: [phone]      Gina Kendrick (:  1990) is a 34 y.o. female,Established patient, here for evaluation of the following chief complaint(s):  Nail Problem (Infection cuticle on right big toe.  Ongoing 1 week.  Neosporin isn't working)      Assessment & Plan   ASSESSMENT/PLAN:  1. Paronychia of great toe  Assessment & Plan:   Mild, do not suspect I&D would be particularly beneficial   She would like to avoid oral antibiotics   Rx for Chlorhexidine solution to soak toe in   Rx for Mupiricin ointment    Discussed treatment plan. If paronychia persists, she may return to office for I&D or for re-evaluation for PO Antibiotics   Pt may RTC to establish care with myself as she is looking for PCP   Orders:  -     Chlorhexidine Gluconate 2 % LIQD; Apply 1 Application topically daily for 5 days, Disp-118 mL, R-0Normal  -     mupirocin (BACTROBAN) 2 % ointment; Apply topically 3 times daily x5D, Disp-1 g, R-0, Normal  2. Other migraine without status migrainosus, not intractable  -     ondansetron (ZOFRAN-ODT) 4 MG disintegrating tablet; Take 1 tablet by mouth every 8 hours as needed for Nausea or Vomiting, Disp-21 tablet, R-0Normal  -     ketorolac (TORADOL) 10 MG tablet; Take 1 tablet by mouth every 6 hours as needed for Pain, Disp-20 tablet, R-0Normal      No results found for any visits on 25.     No follow-ups on file.         Subjective   SUBJECTIVE/OBJECTIVE:  HPI    Ms. Gina Kendrick is a 33 yo female with pmh of disc herniation, anxiety who presents with an infected cuticle on R big toe x 1 week. Denies history of paronychia. States sometimes she does pick at the cuticles.     She is using topical neosporin on the cuticle.     Would like refills on medications she uses prn for her migraines.     Review of Systems   Constitutional:         See HPI for ROS            Objective   Physical

## 2025-02-25 PROBLEM — G43.909 MIGRAINE: Status: ACTIVE | Noted: 2025-02-25

## 2025-02-25 PROBLEM — L03.039 PARONYCHIA OF GREAT TOE: Status: ACTIVE | Noted: 2025-02-25

## 2025-02-25 NOTE — ASSESSMENT & PLAN NOTE
Mild, do not suspect I&D would be particularly beneficial   She would like to avoid oral antibiotics   Rx for Chlorhexidine solution to soak toe in   Rx for Mupiricin ointment    Discussed treatment plan. If paronychia persists, she may return to office for I&D or for re-evaluation for PO Antibiotics   Pt may RTC to establish care with myself as she is looking for PCP

## 2025-02-27 ENCOUNTER — OFFICE VISIT (OUTPATIENT)
Dept: INTERNAL MEDICINE | Age: 35
End: 2025-02-27
Payer: MEDICAID

## 2025-02-27 VITALS
SYSTOLIC BLOOD PRESSURE: 126 MMHG | WEIGHT: 204 LBS | RESPIRATION RATE: 14 BRPM | TEMPERATURE: 97.2 F | BODY MASS INDEX: 37.54 KG/M2 | OXYGEN SATURATION: 98 % | HEIGHT: 62 IN | HEART RATE: 100 BPM | DIASTOLIC BLOOD PRESSURE: 84 MMHG

## 2025-02-27 DIAGNOSIS — Z13.220 SCREENING CHOLESTEROL LEVEL: ICD-10-CM

## 2025-02-27 DIAGNOSIS — M79.7 FIBROMYALGIA: Primary | ICD-10-CM

## 2025-02-27 DIAGNOSIS — M25.571 RIGHT ANKLE PAIN, UNSPECIFIED CHRONICITY: ICD-10-CM

## 2025-02-27 DIAGNOSIS — E66.9 OBESITY (BMI 30-39.9): ICD-10-CM

## 2025-02-27 DIAGNOSIS — F32.A DEPRESSION, UNSPECIFIED DEPRESSION TYPE: ICD-10-CM

## 2025-02-27 DIAGNOSIS — Z13.1 DIABETES MELLITUS SCREENING: ICD-10-CM

## 2025-02-27 DIAGNOSIS — N83.209 CYST OF OVARY, UNSPECIFIED LATERALITY: ICD-10-CM

## 2025-02-27 DIAGNOSIS — L03.039 PARONYCHIA OF GREAT TOE: ICD-10-CM

## 2025-02-27 DIAGNOSIS — M51.27 HERNIATION OF INTERVERTEBRAL DISC BETWEEN L5 AND S1: ICD-10-CM

## 2025-02-27 LAB
ALBUMIN SERPL-MCNC: 4.3 G/DL (ref 3.5–4.6)
ALP SERPL-CCNC: 71 U/L (ref 40–130)
ALT SERPL-CCNC: 9 U/L (ref 0–33)
ANION GAP SERPL CALCULATED.3IONS-SCNC: 14 MEQ/L (ref 9–15)
AST SERPL-CCNC: 12 U/L (ref 0–35)
BASOPHILS # BLD: 0.1 K/UL (ref 0–0.2)
BASOPHILS NFR BLD: 0.6 %
BILIRUB SERPL-MCNC: 0.3 MG/DL (ref 0.2–0.7)
BUN SERPL-MCNC: 18 MG/DL (ref 6–20)
CALCIUM SERPL-MCNC: 9.2 MG/DL (ref 8.5–9.9)
CHLORIDE SERPL-SCNC: 98 MEQ/L (ref 95–107)
CHOLEST SERPL-MCNC: 170 MG/DL (ref 0–199)
CO2 SERPL-SCNC: 27 MEQ/L (ref 20–31)
CREAT SERPL-MCNC: 0.8 MG/DL (ref 0.5–0.9)
EOSINOPHIL # BLD: 0.2 K/UL (ref 0–0.7)
EOSINOPHIL NFR BLD: 2.5 %
ERYTHROCYTE [DISTWIDTH] IN BLOOD BY AUTOMATED COUNT: 12.6 % (ref 11.5–14.5)
GLOBULIN SER CALC-MCNC: 2.9 G/DL (ref 2.3–3.5)
GLUCOSE SERPL-MCNC: 83 MG/DL (ref 70–99)
HCT VFR BLD AUTO: 38.6 % (ref 37–47)
HDLC SERPL-MCNC: 78 MG/DL (ref 40–59)
HGB BLD-MCNC: 12.7 G/DL (ref 12–16)
LDLC SERPL CALC-MCNC: 78 MG/DL (ref 0–129)
LYMPHOCYTES # BLD: 1.7 K/UL (ref 1–4.8)
LYMPHOCYTES NFR BLD: 21.7 %
MCH RBC QN AUTO: 31.5 PG (ref 27–31.3)
MCHC RBC AUTO-ENTMCNC: 32.9 % (ref 33–37)
MCV RBC AUTO: 95.8 FL (ref 79.4–94.8)
MONOCYTES # BLD: 0.6 K/UL (ref 0.2–0.8)
MONOCYTES NFR BLD: 7.5 %
NEUTROPHILS # BLD: 5.3 K/UL (ref 1.4–6.5)
NEUTS SEG NFR BLD: 67.3 %
PLATELET # BLD AUTO: 319 K/UL (ref 130–400)
POTASSIUM SERPL-SCNC: 4.4 MEQ/L (ref 3.4–4.9)
PROT SERPL-MCNC: 7.2 G/DL (ref 6.3–8)
RBC # BLD AUTO: 4.03 M/UL (ref 4.2–5.4)
SODIUM SERPL-SCNC: 139 MEQ/L (ref 135–144)
TRIGL SERPL-MCNC: 70 MG/DL (ref 0–150)
WBC # BLD AUTO: 7.9 K/UL (ref 4.8–10.8)

## 2025-02-27 PROCEDURE — 99214 OFFICE O/P EST MOD 30 MIN: CPT | Performed by: STUDENT IN AN ORGANIZED HEALTH CARE EDUCATION/TRAINING PROGRAM

## 2025-02-27 PROCEDURE — G8427 DOCREV CUR MEDS BY ELIG CLIN: HCPCS | Performed by: STUDENT IN AN ORGANIZED HEALTH CARE EDUCATION/TRAINING PROGRAM

## 2025-02-27 PROCEDURE — G8417 CALC BMI ABV UP PARAM F/U: HCPCS | Performed by: STUDENT IN AN ORGANIZED HEALTH CARE EDUCATION/TRAINING PROGRAM

## 2025-02-27 PROCEDURE — 1036F TOBACCO NON-USER: CPT | Performed by: STUDENT IN AN ORGANIZED HEALTH CARE EDUCATION/TRAINING PROGRAM

## 2025-02-27 SDOH — ECONOMIC STABILITY: FOOD INSECURITY: WITHIN THE PAST 12 MONTHS, THE FOOD YOU BOUGHT JUST DIDN'T LAST AND YOU DIDN'T HAVE MONEY TO GET MORE.: NEVER TRUE

## 2025-02-27 SDOH — ECONOMIC STABILITY: FOOD INSECURITY: WITHIN THE PAST 12 MONTHS, YOU WORRIED THAT YOUR FOOD WOULD RUN OUT BEFORE YOU GOT MONEY TO BUY MORE.: NEVER TRUE

## 2025-02-27 NOTE — PATIENT INSTRUCTIONS
[email or web address]    [employer]   What they Offer: Assistance with utilities and rent   Phone: [phone]   Website: [email or web address]   Address:  [address], [place] [address]      [facility] ([facility])   What they Offer: Provides the residents of [facility], with year-round access to emergency financial assistance for rent, utilities, or other emergency basic needs--at a single site geographically located in your neighborhood of residence   Website: [facility] Online Application      [employer]    What they offer: Utility payment assistance, utility payment plans, utility bill assistance, home energy conservation and winter/summer crisis programs.   Phone: [phone]    Fax: [phone]   Address: [address] [place] [address], [address] [place] [address]   Website: [email or web address]     MEDICAL:    [facility]   What they offer: [employer] discounts fees for qualifying insured and uninsured persons.  We can assist you in signing up for Medicaid, Medicare, and Marketplace Exchange insurance plans.  They offer 4 locations in [place], 2 locations in [place] and 1 in [facility].    Phone Number: [phone]  Website: [email or web address]    [facility]:  What they offer: We provide health care services to the uninsured and underinsured. From providing quality care to connecting patients to critical community resources, our patients and their needs are our highest priority.  Phone number: [phone]  Website: [email or web address]   [facility] Financial Assistance:  What they offer: Assistance with medical bills  Phone number: [phone] option 5          MEDICATIONS:   [employer]  What they offer: [employer] tracks prescription drug prices and provides free drug coupons for discounts on medications.  Website: [email or web address]  [employer]:  What they

## 2025-02-27 NOTE — PROGRESS NOTES
[facility] Internal Medicine  [facility] Primary Care   [address] [place] [address]   P: [phone]      Gina Kendrick (:  1990) is a 34 y.o. female,Established patient, here for evaluation of the following chief complaint(s):  Established New Doctor (Pt is fasting.  Would like to discuss parasites and metals (would like to discuss detoxing her body).  She is in pain and getting healthier would make her feel better.  ) and Ankle Pain (Right ankle pain, woke up 3 weeks ago with it like that.  )      Assessment & Plan   ASSESSMENT/PLAN:  1. Fibromyalgia  2. Depression, unspecified depression type  Assessment & Plan:   Follows with Psychiatry   Orders:  -     CBC with Auto Differential; Future  3. Obesity (BMI 30-39.9)  -     Comprehensive Metabolic Panel; Future  4. Screening cholesterol level  -     Lipid Panel; Future  5. Right ankle pain, unspecified chronicity  Assessment & Plan:   Obtain x-ray prior to appointment with Dr. Phillips next week  Orders:  -     XR ANKLE RIGHT (MIN 3 VIEWS); Future  6. Diabetes mellitus screening  -     Hemoglobin A1C; Future  7. Cyst of ovary, unspecified laterality  -     Testosterone, free, total; Future  -     Luteinizing Hormone; Future  -     Follicle Stimulating Hormone; Future  8. Herniation of intervertebral disc between L5 and S1  Assessment & Plan:   Following with Dr. Phillips  9. Paronychia of great toe  Assessment & Plan:   Improved since last visit, see recent note    Discussed establishing with Functional Medicine at [facility] if she is interested in discussing heavy metal detox, additional hormone level lab work     Results for orders placed or performed in visit on 25   CBC with Auto Differential   Result Value Ref Range    WBC 7.9 4.8 - 10.8 K/uL    RBC 4.03 (L) 4.20 - 5.40 M/uL    Hemoglobin 12.7 12.0 - 16.0 g/dL    Hematocrit 38.6 37.0 - 47.0 %    MCV 95.8 (H) 79.4 - 94.8 fL    MCH 31.5 (H) 27.0 - 31.3 pg    MCHC 32.9 (L) 33.0 - 37.0 %

## 2025-02-28 LAB
ESTIMATED AVERAGE GLUCOSE: 103 MG/DL
FOLLICLE STIMULATING HORMONE: 9.5 MIU/ML
HBA1C MFR BLD: 5.2 % (ref 4–6)
LH: 53.1 MIU/ML (ref 1.7–8.6)
SHBG SERPL-SCNC: 67 NMOL/L (ref 25–122)
TESTOST FREE SERPL-MCNC: 5.4 PG/ML (ref 1.3–9.2)
TESTOST SERPL-MCNC: 48 NG/DL (ref 8–48)

## 2025-03-03 ENCOUNTER — OFFICE VISIT (OUTPATIENT)
Dept: ORTHOPEDIC SURGERY | Age: 35
End: 2025-03-03
Payer: MEDICAID

## 2025-03-03 ENCOUNTER — HOSPITAL ENCOUNTER (OUTPATIENT)
Dept: ORTHOPEDIC SURGERY | Age: 35
Discharge: HOME | End: 2025-03-05
Payer: MEDICAID

## 2025-03-03 VITALS
WEIGHT: 192 LBS | OXYGEN SATURATION: 96 % | TEMPERATURE: 97.3 F | HEIGHT: 62 IN | BODY MASS INDEX: 35.33 KG/M2 | RESPIRATION RATE: 16 BRPM | HEART RATE: 79 BPM

## 2025-03-03 DIAGNOSIS — G89.29 CHRONIC PAIN OF RIGHT ANKLE: Primary | ICD-10-CM

## 2025-03-03 DIAGNOSIS — M25.572 LEFT ANKLE PAIN, UNSPECIFIED CHRONICITY: ICD-10-CM

## 2025-03-03 DIAGNOSIS — M25.571 CHRONIC PAIN OF RIGHT ANKLE: Primary | ICD-10-CM

## 2025-03-03 PROCEDURE — 99213 OFFICE O/P EST LOW 20 MIN: CPT | Performed by: ORTHOPAEDIC SURGERY

## 2025-03-03 PROCEDURE — 1036F TOBACCO NON-USER: CPT | Performed by: ORTHOPAEDIC SURGERY

## 2025-03-03 PROCEDURE — 73610 X-RAY EXAM OF ANKLE: CPT

## 2025-03-03 PROCEDURE — G8417 CALC BMI ABV UP PARAM F/U: HCPCS | Performed by: ORTHOPAEDIC SURGERY

## 2025-03-03 PROCEDURE — 73610 X-RAY EXAM OF ANKLE: CPT | Performed by: ORTHOPAEDIC SURGERY

## 2025-03-03 PROCEDURE — G8427 DOCREV CUR MEDS BY ELIG CLIN: HCPCS | Performed by: ORTHOPAEDIC SURGERY

## 2025-03-03 NOTE — PROGRESS NOTES
(MOBIC) 15 MG tablet, Take 1 tablet by mouth daily as needed for Pain (Patient not taking: Reported on 3/3/2025), Disp: 30 tablet, Rfl: 2  --------------------------------------------------------------------------------------------------------------    Thang Phillips DO  Orthopedic and Spine Surgeon  [facility]  [phone]

## 2025-03-04 ENCOUNTER — TELEPHONE (OUTPATIENT)
Dept: ORTHOPEDIC SURGERY | Age: 35
End: 2025-03-04

## 2025-03-13 DIAGNOSIS — G89.29 CHRONIC BILATERAL LOW BACK PAIN, UNSPECIFIED WHETHER SCIATICA PRESENT: ICD-10-CM

## 2025-03-13 DIAGNOSIS — M54.50 CHRONIC BILATERAL LOW BACK PAIN, UNSPECIFIED WHETHER SCIATICA PRESENT: ICD-10-CM

## 2025-03-13 RX ORDER — BACLOFEN 10 MG/1
10 TABLET ORAL 2 TIMES DAILY PRN
Qty: 60 TABLET | Refills: 1 | OUTPATIENT
Start: 2025-03-13

## 2025-03-25 ENCOUNTER — OFFICE VISIT (OUTPATIENT)
Dept: FAMILY MEDICINE CLINIC | Age: 35
End: 2025-03-25
Payer: MEDICAID

## 2025-03-25 VITALS
DIASTOLIC BLOOD PRESSURE: 74 MMHG | BODY MASS INDEX: 37.2 KG/M2 | SYSTOLIC BLOOD PRESSURE: 116 MMHG | TEMPERATURE: 97.2 F | HEART RATE: 96 BPM | WEIGHT: 203.4 LBS | OXYGEN SATURATION: 90 %

## 2025-03-25 DIAGNOSIS — M25.571 PAIN AND SWELLING OF RIGHT ANKLE: Primary | ICD-10-CM

## 2025-03-25 DIAGNOSIS — M25.471 PAIN AND SWELLING OF RIGHT ANKLE: Primary | ICD-10-CM

## 2025-03-25 PROCEDURE — 1036F TOBACCO NON-USER: CPT | Performed by: NURSE PRACTITIONER

## 2025-03-25 PROCEDURE — G8417 CALC BMI ABV UP PARAM F/U: HCPCS | Performed by: NURSE PRACTITIONER

## 2025-03-25 PROCEDURE — 99213 OFFICE O/P EST LOW 20 MIN: CPT | Performed by: NURSE PRACTITIONER

## 2025-03-25 PROCEDURE — G8427 DOCREV CUR MEDS BY ELIG CLIN: HCPCS | Performed by: NURSE PRACTITIONER

## 2025-03-25 RX ORDER — METHYLPREDNISOLONE 4 MG/1
TABLET ORAL
Qty: 1 KIT | Refills: 0 | Status: SHIPPED | OUTPATIENT
Start: 2025-03-25

## 2025-03-25 ASSESSMENT — ENCOUNTER SYMPTOMS
WHEEZING: 0
COLOR CHANGE: 0
COUGH: 0
SHORTNESS OF BREATH: 0

## 2025-03-25 NOTE — PROGRESS NOTES
Gina Kendrick (:  1990) is a 34 y.o. female, Established patient, here for evaluation of the following chief complaint(s):  Leg Swelling (In both knees and ankles, rt worse than left. Has MRI of ankle 3/31, swelling ongoing x 2 weeks)      Vitals:    25 1149   BP: 116/74   Pulse: 96   Temp: 97.2 °F (36.2 °C)   SpO2: 90%       ASSESSMENT/PLAN:  1. Pain and swelling of right ankle  -     methylPREDNISolone (MEDROL, SUNI,) 4 MG tablet; Take by mouth., Disp-1 kit, R-0Normal  -     diclofenac sodium (VOLTAREN) 1 % GEL; Apply 4 g topically 4 times daily, Topical, 4 TIMES DAILY Starting Tue 3/25/2025, Disp-150 g, R-0, Normal        -     arthritis?RA?      Return in about 1 month (around 2025), or if symptoms worsen or fail to improve, for follow up with PCP.      SUBJECTIVE/OBJECTIVE:    Other  This is a new problem. Episode onset: x2 weeks swelling in both knees and ankles, right is worse than left.  Has MRI 3/31. The problem occurs constantly. The problem has been gradually worsening. Associated symptoms include arthralgias and joint swelling. Pertinent negatives include no chest pain, chills, coughing, fever or numbness. The symptoms are aggravated by walking and standing. She has tried NSAIDs for the symptoms. The treatment provided no relief.         Review of Systems   Constitutional:  Negative for activity change, chills and fever.   Respiratory:  Negative for cough, shortness of breath and wheezing.    Cardiovascular:  Negative for chest pain and palpitations.   Musculoskeletal:  Positive for arthralgias and joint swelling. Negative for gait problem.   Skin:  Negative for color change, pallor and wound.   Neurological:  Negative for numbness.         Physical Exam  Vitals reviewed.   Constitutional:       General: She is not in acute distress.     Appearance: Normal appearance.   Cardiovascular:      Rate and Rhythm: Normal rate and regular rhythm.   Pulmonary:      Effort: Pulmonary effort

## 2025-03-29 PROBLEM — Z13.1 DIABETES MELLITUS SCREENING: Status: RESOLVED | Noted: 2025-02-27 | Resolved: 2025-03-29

## 2025-03-29 PROBLEM — Z13.220 SCREENING CHOLESTEROL LEVEL: Status: RESOLVED | Noted: 2025-02-27 | Resolved: 2025-03-29

## 2025-03-31 ENCOUNTER — HOSPITAL ENCOUNTER (OUTPATIENT)
Dept: MRI IMAGING | Age: 35
Discharge: HOME | End: 2025-04-02
Attending: ORTHOPAEDIC SURGERY
Payer: MEDICAID

## 2025-03-31 DIAGNOSIS — M25.571 CHRONIC PAIN OF RIGHT ANKLE: ICD-10-CM

## 2025-03-31 DIAGNOSIS — G89.29 CHRONIC PAIN OF RIGHT ANKLE: ICD-10-CM

## 2025-03-31 PROCEDURE — 73721 MRI JNT OF LWR EXTRE W/O DYE: CPT

## 2025-04-01 ENCOUNTER — OFFICE VISIT (OUTPATIENT)
Age: 35
End: 2025-04-01
Payer: MEDICAID

## 2025-04-01 ENCOUNTER — HOSPITAL ENCOUNTER (OUTPATIENT)
Dept: ORTHOPEDIC SURGERY | Age: 35
Discharge: HOME | End: 2025-04-03
Payer: MEDICAID

## 2025-04-01 VITALS
BODY MASS INDEX: 36.8 KG/M2 | HEART RATE: 60 BPM | TEMPERATURE: 97.1 F | HEIGHT: 62 IN | OXYGEN SATURATION: 99 % | WEIGHT: 200 LBS

## 2025-04-01 DIAGNOSIS — M25.572 LEFT ANKLE PAIN, UNSPECIFIED CHRONICITY: ICD-10-CM

## 2025-04-01 DIAGNOSIS — M25.571 RIGHT ANKLE PAIN, UNSPECIFIED CHRONICITY: ICD-10-CM

## 2025-04-01 DIAGNOSIS — M51.27 HERNIATION OF INTERVERTEBRAL DISC BETWEEN L5 AND S1: ICD-10-CM

## 2025-04-01 PROCEDURE — 72100 X-RAY EXAM L-S SPINE 2/3 VWS: CPT

## 2025-04-01 PROCEDURE — 99214 OFFICE O/P EST MOD 30 MIN: CPT | Performed by: ORTHOPAEDIC SURGERY

## 2025-04-01 PROCEDURE — G8427 DOCREV CUR MEDS BY ELIG CLIN: HCPCS | Performed by: ORTHOPAEDIC SURGERY

## 2025-04-01 PROCEDURE — 99213 OFFICE O/P EST LOW 20 MIN: CPT | Performed by: ORTHOPAEDIC SURGERY

## 2025-04-01 PROCEDURE — G8417 CALC BMI ABV UP PARAM F/U: HCPCS | Performed by: ORTHOPAEDIC SURGERY

## 2025-04-01 PROCEDURE — 1036F TOBACCO NON-USER: CPT | Performed by: ORTHOPAEDIC SURGERY

## 2025-04-01 NOTE — PROGRESS NOTES
tablet, Take by mouth., Disp: 1 kit, Rfl: 0    diclofenac sodium (VOLTAREN) 1 % GEL, Apply 4 g topically 4 times daily, Disp: 150 g, Rfl: 0    VYVANSE 20 MG CAPS, Take 1 capsule by mouth daily., Disp: , Rfl:     SPRAVATO, 84 MG DOSE, 28 MG/DEVICE SOPK nasal solution, , Disp: , Rfl:     baclofen (LIORESAL) 10 MG tablet, Take 1 tablet by mouth 2 times daily as needed (back spasms), Disp: 60 tablet, Rfl: 1    diazePAM (VALIUM) 5 MG tablet, take 1 tablet by mouth once a day as needed for anxiety, Disp: , Rfl:     tretinoin (RETIN-A) 0.025 % gel, Apply topically nightly., Disp: 45 g, Rfl: 3    QUEtiapine (SEROQUEL) 200 MG tablet, Take 1 tablet by mouth nightly, Disp: , Rfl:     gabapentin (NEURONTIN) 600 MG tablet, Take 1 tablet by mouth in the morning and 1 tablet in the evening., Disp: , Rfl:     DULoxetine (CYMBALTA) 60 MG extended release capsule, Take 1 capsule by mouth daily, Disp: , Rfl:   --------------------------------------------------------------------------------------------------------------    Thang Phillips DO  Orthopedic and Spine Surgeon  [facility]  [phone]

## 2025-04-02 ENCOUNTER — OFFICE VISIT (OUTPATIENT)
Dept: INTERNAL MEDICINE | Age: 35
End: 2025-04-02
Payer: MEDICAID

## 2025-04-02 VITALS
BODY MASS INDEX: 37.91 KG/M2 | HEART RATE: 87 BPM | OXYGEN SATURATION: 97 % | RESPIRATION RATE: 18 BRPM | WEIGHT: 206 LBS | SYSTOLIC BLOOD PRESSURE: 110 MMHG | DIASTOLIC BLOOD PRESSURE: 68 MMHG | HEIGHT: 62 IN

## 2025-04-02 DIAGNOSIS — M79.7 FIBROMYALGIA: ICD-10-CM

## 2025-04-02 DIAGNOSIS — F17.209 NICOTINE DEPENDENCE WITH NICOTINE-INDUCED DISORDER, UNSPECIFIED NICOTINE PRODUCT TYPE: ICD-10-CM

## 2025-04-02 DIAGNOSIS — M25.50 POLYARTHRALGIA: ICD-10-CM

## 2025-04-02 DIAGNOSIS — M54.42 CHRONIC BILATERAL LOW BACK PAIN WITH LEFT-SIDED SCIATICA: ICD-10-CM

## 2025-04-02 DIAGNOSIS — E66.812 CLASS 2 OBESITY WITH BODY MASS INDEX (BMI) OF 37.0 TO 37.9 IN ADULT, UNSPECIFIED OBESITY TYPE, UNSPECIFIED WHETHER SERIOUS COMORBIDITY PRESENT: Primary | ICD-10-CM

## 2025-04-02 DIAGNOSIS — G89.29 CHRONIC BILATERAL LOW BACK PAIN WITH LEFT-SIDED SCIATICA: ICD-10-CM

## 2025-04-02 PROBLEM — F43.10 PTSD (POST-TRAUMATIC STRESS DISORDER): Status: ACTIVE | Noted: 2020-03-15

## 2025-04-02 LAB
CRP SERPL HS-MCNC: <3 MG/L (ref 0–5)
ERYTHROCYTE [SEDIMENTATION RATE] IN BLOOD BY WESTERGREN METHOD: 9 MM (ref 0–20)

## 2025-04-02 PROCEDURE — 99214 OFFICE O/P EST MOD 30 MIN: CPT | Performed by: STUDENT IN AN ORGANIZED HEALTH CARE EDUCATION/TRAINING PROGRAM

## 2025-04-02 PROCEDURE — G8417 CALC BMI ABV UP PARAM F/U: HCPCS | Performed by: STUDENT IN AN ORGANIZED HEALTH CARE EDUCATION/TRAINING PROGRAM

## 2025-04-02 PROCEDURE — G8427 DOCREV CUR MEDS BY ELIG CLIN: HCPCS | Performed by: STUDENT IN AN ORGANIZED HEALTH CARE EDUCATION/TRAINING PROGRAM

## 2025-04-02 PROCEDURE — 1036F TOBACCO NON-USER: CPT | Performed by: STUDENT IN AN ORGANIZED HEALTH CARE EDUCATION/TRAINING PROGRAM

## 2025-04-02 NOTE — PATIENT INSTRUCTIONS
[facility] - [place], [place] - [address] - P [phone] - F [phone]  [address] [place] [address]  Phone: [phone]  Fax: [phone]         Patient Education        Learning About Vaping  What is vaping?     Vaping is the use of a device to inhale vapor that may contain nicotine, flavorings, or chemicals from marijuana. The devices may also be called electronic cigarettes or e-cigarettes. They may look like pens or flash drives.  How do vapes work?  Vapes, or vaping devices, include a mouthpiece, a battery, a cartridge that holds a liquid or dry material, and a heating element. When you breathe in through the mouthpiece, it turns on the battery and heating element. The heat turns the liquid or dry material into vapor.  What are the safety concerns?  These are some things to consider about vaping.  It can cause a deadly lung injury.  The \"vapor\" made by vaping contains harmful chemicals. There have been cases of lung disease and death related to vaping. Many of these may be from vaping products with THC (a chemical in marijuana) or other additives. The exact cause of lung damage is not known.  Vaping products often have nicotine.  Nicotine is addictive. It can be hard to stop using it. Nicotine can be harmful to developing brains, such as in fetuses, children, and young adults. Liquid nicotine can be poisonous if swallowed or spilled on skin. Keep it out of children's reach.  Vaping can expose those around you to secondhand aerosol.  There is a concern about possible health risks from secondhand aerosol exposure.  Vaping devices can catch fire or explode.  Vaping devices can explode. This can cause burns or injuries.  Should you use vaping to stop smoking?  Some people try to quit smoking by reducing the amount of nicotine they vape. They do this slowly over time. If you're thinking of using vaping to help you stop smoking, talk to your doctor first. Here are some other things to think

## 2025-04-02 NOTE — PROGRESS NOTES
[facility]  [address] [place] [address]  Dept: [phone]  Dept Fax: [phone]  Loc: [phone]     Visit type: Established patient  Reason for Visit: OTHER (Discuss options to lose wieght for upcoming surgery. /Discuss options to stop vaping. /)    Assessment/Plan   1. Class 2 obesity with body mass index (BMI) of 37.0 to 37.9 in adult, unspecified obesity type, unspecified whether serious comorbidity present  -     Semaglutide,0.25 or 0.5MG/DOS, 2 MG/1.5ML SOPN; Inject 0.3 mg into the skin once a week COMPOUND MEDICATION, Disp-4 Adjustable Dose Pre-filled Pen Syringe, R-1Normal  2. Nicotine dependence with nicotine-induced disorder, unspecified nicotine product type  -     nicotine (NICODERM CQ) 7 MG/24HR; Place 1 patch onto the skin daily, Disp-30 patch, R-2Normal  3. Polyarthralgia  -     Rheumatoid Factor; Future  -     ISIDORO Screen With Reflex; Future  -     C-Reactive Protein; Future  -     Sedimentation Rate; Future  -     Lupus (LE) Panel w/ reflex; Future  -     C3 Complement; Future  -     C4 Complement; Future  -     Hla-B27 Antigen; Future      Assessment & Plan  Obesity   Chronic, uncontrolled  -semaglutide deemed appropriate for weight loss given current BMI, inability to exercise, and healthy eating habits  - Discussed potential risks of semaglutide, including pancreatitis and thyroid cancer- patient has history of pancreatitis that was reportedly likely alcohol induced   - Prescription for semaglutide starting at 0.3 mg  - Advised to incorporate high-protein foods into diet  - Discontinue semaglutide immediately if symptoms of pancreatitis (severe pain, nausea, vomiting) occur    Nicotine dependence  Chronic, uncontrolled   - Prescription for 7 mg nicotine patch  - Discontinue use if experiencing headaches  - May continue using gum or lozenges as needed  - Suggested use of vape juices with zero nicotine content to wean off

## 2025-04-03 LAB
C3 SERPL-MCNC: 135 MG/DL (ref 90–180)
C4 SERPL-MCNC: 24 MG/DL (ref 10–40)
RHEUMATOID FACTOR: <10 IU/ML (ref 0–13)

## 2025-04-03 ASSESSMENT — ENCOUNTER SYMPTOMS
ABDOMINAL PAIN: 0
SHORTNESS OF BREATH: 0

## 2025-04-05 LAB
ANA PAT SER IF-IMP: ABNORMAL
ANA PAT SER IF-IMP: ABNORMAL
C3 SERPL-MCNC: 141 MG/DL (ref 90–180)
C4 SERPL-MCNC: 24 MG/DL (ref 10–40)
DSDNA AB TITR SER CLIF: 0 IU (ref 0–24)
NUCLEAR IGG SER QL IA: NORMAL
NUCLEAR IGG SER QL IF: DETECTED
NUCLEAR IGG TITR SER IF: ABNORMAL {TITER}
RHEUMATOID FACT SER NEPH-ACNC: <10 IU/ML (ref 0–14)
THYROPEROXIDASE AB SERPL-ACNC: 0.3 IU/ML (ref 0–9)

## 2025-04-06 LAB — ENA RNP IGG SER IA-ACNC: 5 UNITS (ref 0–19)

## 2025-04-07 ENCOUNTER — RESULTS FOLLOW-UP (OUTPATIENT)
Dept: INTERNAL MEDICINE | Age: 35
End: 2025-04-07

## 2025-04-07 DIAGNOSIS — M25.50 POLYARTHRALGIA: Primary | ICD-10-CM

## 2025-04-07 LAB
ENA SCL70 IGG SER QL: 1 AU/ML (ref 0–40)
ENA SM IGG SER-ACNC: 3 AU/ML (ref 0–40)
ENA SS-A 60KD AB SER-ACNC: 1 AU/ML (ref 0–40)
ENA SS-A IGG SER IA-ACNC: 3 AU/ML (ref 0–40)
ENA SS-B IGG SER IA-ACNC: 1 AU/ML (ref 0–40)

## 2025-04-09 ENCOUNTER — OFFICE VISIT (OUTPATIENT)
Age: 35
End: 2025-04-09
Payer: MEDICAID

## 2025-04-09 ENCOUNTER — HOSPITAL ENCOUNTER (OUTPATIENT)
Dept: ORTHOPEDIC SURGERY | Age: 35
Discharge: HOME | End: 2025-04-11
Payer: MEDICAID

## 2025-04-09 VITALS
WEIGHT: 200 LBS | TEMPERATURE: 97.1 F | OXYGEN SATURATION: 98 % | HEIGHT: 61 IN | HEART RATE: 78 BPM | BODY MASS INDEX: 37.76 KG/M2

## 2025-04-09 DIAGNOSIS — M25.572 LEFT ANKLE PAIN, UNSPECIFIED CHRONICITY: ICD-10-CM

## 2025-04-09 DIAGNOSIS — M25.571 PAIN AND SWELLING OF RIGHT ANKLE: ICD-10-CM

## 2025-04-09 DIAGNOSIS — M65.972 SYNOVITIS OF LEFT ANKLE: ICD-10-CM

## 2025-04-09 DIAGNOSIS — M25.471 PAIN AND SWELLING OF RIGHT ANKLE: ICD-10-CM

## 2025-04-09 DIAGNOSIS — M65.971 SYNOVITIS OF RIGHT ANKLE: Primary | ICD-10-CM

## 2025-04-09 PROCEDURE — G8427 DOCREV CUR MEDS BY ELIG CLIN: HCPCS | Performed by: STUDENT IN AN ORGANIZED HEALTH CARE EDUCATION/TRAINING PROGRAM

## 2025-04-09 PROCEDURE — 99214 OFFICE O/P EST MOD 30 MIN: CPT | Performed by: STUDENT IN AN ORGANIZED HEALTH CARE EDUCATION/TRAINING PROGRAM

## 2025-04-09 PROCEDURE — G8417 CALC BMI ABV UP PARAM F/U: HCPCS | Performed by: STUDENT IN AN ORGANIZED HEALTH CARE EDUCATION/TRAINING PROGRAM

## 2025-04-09 PROCEDURE — 73610 X-RAY EXAM OF ANKLE: CPT

## 2025-04-09 PROCEDURE — 99213 OFFICE O/P EST LOW 20 MIN: CPT | Performed by: STUDENT IN AN ORGANIZED HEALTH CARE EDUCATION/TRAINING PROGRAM

## 2025-04-09 PROCEDURE — 1036F TOBACCO NON-USER: CPT | Performed by: STUDENT IN AN ORGANIZED HEALTH CARE EDUCATION/TRAINING PROGRAM

## 2025-04-09 RX ORDER — MELOXICAM 15 MG/1
15 TABLET ORAL DAILY
Qty: 30 TABLET | Refills: 2 | Status: SHIPPED | OUTPATIENT
Start: 2025-04-09

## 2025-04-09 ASSESSMENT — ENCOUNTER SYMPTOMS
NAUSEA: 0
COLOR CHANGE: 0
SHORTNESS OF BREATH: 0
VOMITING: 0

## 2025-04-09 NOTE — PROGRESS NOTES
(MIN 3 VIEWS)     Standing Status:   Future     Number of Occurrences:   1     Expected Date:   4/9/2025     Expiration Date:   4/8/2026     Reason for exam::   left ankle pain    Ambulatory referral to Physical Therapy     Referral Priority:   Routine     Referral Type:   Eval and Treat     Referral Reason:   Specialty Services Required     Requested Specialty:   Physical Therapy     Number of Visits Requested:   1     Orders Placed This Encounter   Medications    diclofenac sodium (VOLTAREN) 1 % GEL     Sig: Apply 4 g topically 4 times daily     Dispense:  150 g     Refill:  0    meloxicam (MOBIC) 15 MG tablet     Sig: Take 1 tablet by mouth daily     Dispense:  30 tablet     Refill:  2       No follow-ups on file.      Kaushik Webb, DO

## 2025-04-11 ENCOUNTER — TELEPHONE (OUTPATIENT)
Age: 35
End: 2025-04-11

## 2025-04-11 ENCOUNTER — OFFICE VISIT (OUTPATIENT)
Age: 35
End: 2025-04-11
Payer: MEDICAID

## 2025-04-11 ENCOUNTER — HOSPITAL ENCOUNTER (OUTPATIENT)
Dept: LAB | Age: 35
Discharge: HOME | End: 2025-04-11
Payer: MEDICAID

## 2025-04-11 VITALS
SYSTOLIC BLOOD PRESSURE: 122 MMHG | WEIGHT: 205 LBS | BODY MASS INDEX: 38.71 KG/M2 | DIASTOLIC BLOOD PRESSURE: 80 MMHG | HEIGHT: 61 IN | TEMPERATURE: 98.1 F

## 2025-04-11 DIAGNOSIS — M25.50 POLYARTHRALGIA: ICD-10-CM

## 2025-04-11 DIAGNOSIS — M51.27 HERNIATION OF INTERVERTEBRAL DISC BETWEEN L5 AND S1: Primary | ICD-10-CM

## 2025-04-11 PROCEDURE — 36415 COLL VENOUS BLD VENIPUNCTURE: CPT

## 2025-04-11 PROCEDURE — 99203 OFFICE O/P NEW LOW 30 MIN: CPT | Performed by: SURGERY

## 2025-04-11 PROCEDURE — G8417 CALC BMI ABV UP PARAM F/U: HCPCS | Performed by: SURGERY

## 2025-04-11 PROCEDURE — G8427 DOCREV CUR MEDS BY ELIG CLIN: HCPCS | Performed by: SURGERY

## 2025-04-11 PROCEDURE — 99202 OFFICE O/P NEW SF 15 MIN: CPT | Performed by: SURGERY

## 2025-04-11 PROCEDURE — 86812 HLA TYPING A B OR C: CPT

## 2025-04-11 PROCEDURE — 1036F TOBACCO NON-USER: CPT | Performed by: SURGERY

## 2025-04-11 NOTE — PROGRESS NOTES
GENERAL SURGERY CLINIC NOTE    Pt Name: Gina Kendrick  MRN: [id number]  Date: 4/11/2025        SUBJECTIVE:     History of Chief Complaint:    Gina is a 34 y.o. female who presents with the referral of Dr. Phillips for evaluation for ALIF surgery. She is stating that she gained some weight for the last months and she is willing to loose some weight.        Past Medical History:   Diagnosis Date    ADHD     Anxiety     Arthritis     Arthritis     Chronic back pain     Chronic headaches     Depression     Fibromyalgia     Gastritis     HPV in female     Hx of degenerative disc disease     Irritable bowel syndrome     PTSD (post-traumatic stress disorder)      Past Surgical History:   Procedure Laterality Date    CHOLECYSTECTOMY      COLONOSCOPY  2021    PAIN MANAGEMENT PROCEDURE N/A 7/9/2024    Caudal epidural steroid injection performed by Thang Phillips DO at [facility] OR    TONSILLECTOMY      WISDOM TOOTH EXTRACTION       Prior to Admission medications    Medication Sig Start Date End Date Taking? Authorizing Provider   diclofenac sodium (VOLTAREN) 1 % GEL Apply 4 g topically 4 times daily 4/9/25  Yes Kaushik Webb DO   meloxicam (MOBIC) 15 MG tablet Take 1 tablet by mouth daily 4/9/25  Yes Kaushik Webb DO   nicotine (NICODERM CQ) 7 MG/24HR Place 1 patch onto the skin daily 4/2/25 7/1/25 Yes Bhakti Arnett MD   methylPREDNISolone (MEDROL, SUNI,) 4 MG tablet Take by mouth. 3/25/25  Yes Rosalia Worley APRN   VYVANSE 20 MG CAPS Take 1 capsule by mouth daily. 2/20/25  Yes ProviderMerary MD   SPRAVATO, 84 MG DOSE, 28 MG/DEVICE SOPK nasal solution  1/15/25  Yes Merary Bhatia MD   baclofen (LIORESAL) 10 MG tablet Take 1 tablet by mouth 2 times daily as needed (back spasms) 1/13/25  Yes Ken Stockton MD   diazePAM (VALIUM) 5 MG tablet take 1 tablet by mouth once a day as needed for anxiety 12/16/24  Yes Merary Bhatia MD   tretinoin (RETIN-A) 0.025 % gel Apply topically nightly. 12/24/24  Yes

## 2025-04-11 NOTE — TELEPHONE ENCOUNTER
Pt stopped in after appointment with general surgery and can't proceed with surgery from the front at this time. Wants to proceed with scheduling surgery with Dr. Phillips. Pt would like to know if she needs a follow up.

## 2025-04-13 LAB — HLA-B27 QL FC: NEGATIVE

## 2025-04-15 ENCOUNTER — RESULTS FOLLOW-UP (OUTPATIENT)
Dept: INTERNAL MEDICINE | Age: 35
End: 2025-04-15

## 2025-04-18 ENCOUNTER — TELEPHONE (OUTPATIENT)
Age: 35
End: 2025-04-18

## 2025-04-18 NOTE — TELEPHONE ENCOUNTER
Called and spoke to pt. Pt is scheduled for 5/2/25 to have MRI of the lumbar spine. Made pt a f/u appt for 5/6/25 to review results.

## 2025-04-22 ENCOUNTER — HOSPITAL ENCOUNTER (OUTPATIENT)
Dept: PHYSICAL THERAPY | Age: 35
Setting detail: THERAPIES SERIES
Discharge: HOME | End: 2025-04-22
Payer: MEDICAID

## 2025-04-22 PROCEDURE — 97110 THERAPEUTIC EXERCISES: CPT

## 2025-04-22 PROCEDURE — 97162 PT EVAL MOD COMPLEX 30 MIN: CPT

## 2025-04-22 ASSESSMENT — PAIN DESCRIPTION - LOCATION: LOCATION: ANKLE;KNEE

## 2025-04-22 ASSESSMENT — PAIN DESCRIPTION - DESCRIPTORS: DESCRIPTORS: ACHING

## 2025-04-22 ASSESSMENT — PAIN SCALES - GENERAL: PAINLEVEL_OUTOF10: 5

## 2025-04-22 ASSESSMENT — PAIN DESCRIPTION - PAIN TYPE: TYPE: ACUTE PAIN;CHRONIC PAIN

## 2025-04-22 ASSESSMENT — PAIN DESCRIPTION - ORIENTATION: ORIENTATION: RIGHT;LEFT

## 2025-04-22 NOTE — PROGRESS NOTES
=  Physical Therapy: Initial Evaluation    Patient: Gina Kendrick (34 y.o. female)   Examination Date: 2025  Plan of Care Certification Period: 2025 to 25      :  1990 ;    Confirmed: Yes MRN: [id number]  CSN: [id number]   Insurance: Payor: [facility] MEDICAID / Plan: [facility] MEDICA / Product Type: *No Product type* /   Insurance ID: [id number] - (Medicaid Managed) Secondary Insurance (if applicable):    Referring Physician: Kaushik Webb DO Rech   PCP: Bhakti Arnett MD Visits to Date/Visits Approved: 1 / 10    No Show/Cancelled Appts:      Medical Diagnosis: Synovitis of left ankle [M65.972]  Synovitis of right ankle [M65.971] Synovitis of right and left ankle and B knee pain  Treatment Diagnosis: Synovitis of B ankles and B knee pain     PERTINENT MEDICAL HISTORY   Patient Assessed for Rehabilitation Services: Yes  Self reported health status:: Good    Medical History: Chart Reviewed: Yes   Past Medical History:   Diagnosis Date    ADHD     Anxiety     Arthritis     Arthritis     Chronic back pain     Chronic headaches     Depression     Fibromyalgia     Gastritis     HPV in female     Hx of degenerative disc disease     Irritable bowel syndrome     PTSD (post-traumatic stress disorder)      Surgical History:   Past Surgical History:   Procedure Laterality Date    CHOLECYSTECTOMY      COLONOSCOPY      PAIN MANAGEMENT PROCEDURE N/A 2024    Caudal epidural [place] [facility] [place]    TONSILLECTOMY      WISDOM TOOTH EXTRACTION         Medications:   Current Outpatient Medications:     diclofenac sodium (VOLTAREN) 1 % GEL, Apply 4 g topically 4 times daily, Disp: 150 g, Rfl: 0    meloxicam (MOBIC) 15 MG tablet, Take 1 tablet by mouth daily, Disp: 30 tablet, Rfl: 2    nicotine (NICODERM CQ) 7 MG/24HR, Place 1 patch onto the skin daily, Disp: 30 patch, Rfl: 2    Semaglutide,0.25 or 0.5MG/DOS, 2 MG/1.5ML SOPN, Inject

## 2025-04-24 ENCOUNTER — OFFICE VISIT (OUTPATIENT)
Age: 35
End: 2025-04-24
Payer: MEDICAID

## 2025-04-24 ENCOUNTER — RESULTS FOLLOW-UP (OUTPATIENT)
Age: 35
End: 2025-04-24

## 2025-04-24 ENCOUNTER — HOSPITAL ENCOUNTER (OUTPATIENT)
Dept: GENERAL RADIOLOGY | Age: 35
Discharge: HOME | End: 2025-04-26
Attending: INTERNAL MEDICINE
Payer: MEDICAID

## 2025-04-24 ENCOUNTER — HOSPITAL ENCOUNTER (OUTPATIENT)
Dept: PHYSICAL THERAPY | Age: 35
Setting detail: THERAPIES SERIES
Discharge: HOME | End: 2025-04-24
Payer: MEDICAID

## 2025-04-24 VITALS
WEIGHT: 202 LBS | HEIGHT: 61 IN | OXYGEN SATURATION: 96 % | HEART RATE: 98 BPM | SYSTOLIC BLOOD PRESSURE: 124 MMHG | DIASTOLIC BLOOD PRESSURE: 74 MMHG | BODY MASS INDEX: 38.14 KG/M2

## 2025-04-24 DIAGNOSIS — M19.072 ARTHRITIS OF BOTH ANKLES: Primary | ICD-10-CM

## 2025-04-24 DIAGNOSIS — M19.071 ARTHRITIS OF BOTH ANKLES: ICD-10-CM

## 2025-04-24 DIAGNOSIS — Z51.81 ENCOUNTER FOR MEDICATION MONITORING: ICD-10-CM

## 2025-04-24 DIAGNOSIS — R06.02 SHORTNESS OF BREATH: ICD-10-CM

## 2025-04-24 DIAGNOSIS — M19.072 ARTHRITIS OF BOTH ANKLES: ICD-10-CM

## 2025-04-24 DIAGNOSIS — M19.071 ARTHRITIS OF BOTH ANKLES: Primary | ICD-10-CM

## 2025-04-24 LAB
ALBUMIN SERPL-MCNC: 4.3 G/DL (ref 3.5–4.6)
ALP SERPL-CCNC: 52 U/L (ref 40–130)
ALT SERPL-CCNC: 10 U/L (ref 0–33)
ANION GAP SERPL CALCULATED.3IONS-SCNC: 11 MEQ/L (ref 9–15)
AST SERPL-CCNC: 14 U/L (ref 0–35)
BASOPHILS # BLD: 0 K/UL (ref 0–0.2)
BASOPHILS NFR BLD: 0.7 %
BILIRUB SERPL-MCNC: 0.3 MG/DL (ref 0.2–0.7)
BUN SERPL-MCNC: 18 MG/DL (ref 6–20)
CALCIUM SERPL-MCNC: 9.1 MG/DL (ref 8.5–9.9)
CHLORIDE SERPL-SCNC: 101 MEQ/L (ref 95–107)
CO2 SERPL-SCNC: 26 MEQ/L (ref 20–31)
CREAT SERPL-MCNC: 0.7 MG/DL (ref 0.5–0.9)
CRP SERPL HS-MCNC: 3.7 MG/L (ref 0–5)
EOSINOPHIL # BLD: 0.2 K/UL (ref 0–0.7)
EOSINOPHIL NFR BLD: 3.2 %
ERYTHROCYTE [DISTWIDTH] IN BLOOD BY AUTOMATED COUNT: 11.9 % (ref 11.5–14.5)
ERYTHROCYTE [SEDIMENTATION RATE] IN BLOOD BY WESTERGREN METHOD: 16 MM (ref 0–20)
GLOBULIN SER CALC-MCNC: 2.8 G/DL (ref 2.3–3.5)
GLUCOSE SERPL-MCNC: 82 MG/DL (ref 70–99)
HBV SURFACE AG SERPL QL IA: NORMAL
HCT VFR BLD AUTO: 37.9 % (ref 37–47)
HEPATITIS C ANTIBODY: NONREACTIVE
HGB BLD-MCNC: 12.6 G/DL (ref 12–16)
LYMPHOCYTES # BLD: 1.5 K/UL (ref 1–4.8)
LYMPHOCYTES NFR BLD: 26.4 %
MCH RBC QN AUTO: 30.9 PG (ref 27–31.3)
MCHC RBC AUTO-ENTMCNC: 33.2 % (ref 33–37)
MCV RBC AUTO: 92.9 FL (ref 79.4–94.8)
MONOCYTES # BLD: 0.3 K/UL (ref 0.2–0.8)
MONOCYTES NFR BLD: 6 %
NEUTROPHILS # BLD: 3.5 K/UL (ref 1.4–6.5)
NEUTS SEG NFR BLD: 63.5 %
PLATELET # BLD AUTO: 345 K/UL (ref 130–400)
POTASSIUM SERPL-SCNC: 4.1 MEQ/L (ref 3.4–4.9)
PROT SERPL-MCNC: 7.1 G/DL (ref 6.3–8)
RBC # BLD AUTO: 4.08 M/UL (ref 4.2–5.4)
SODIUM SERPL-SCNC: 138 MEQ/L (ref 135–144)
WBC # BLD AUTO: 5.5 K/UL (ref 4.8–10.8)

## 2025-04-24 PROCEDURE — 97110 THERAPEUTIC EXERCISES: CPT

## 2025-04-24 PROCEDURE — 99204 OFFICE O/P NEW MOD 45 MIN: CPT | Performed by: INTERNAL MEDICINE

## 2025-04-24 PROCEDURE — 71046 X-RAY EXAM CHEST 2 VIEWS: CPT

## 2025-04-24 PROCEDURE — G8417 CALC BMI ABV UP PARAM F/U: HCPCS | Performed by: INTERNAL MEDICINE

## 2025-04-24 PROCEDURE — G8427 DOCREV CUR MEDS BY ELIG CLIN: HCPCS | Performed by: INTERNAL MEDICINE

## 2025-04-24 PROCEDURE — 97140 MANUAL THERAPY 1/> REGIONS: CPT

## 2025-04-24 PROCEDURE — 99203 OFFICE O/P NEW LOW 30 MIN: CPT | Performed by: INTERNAL MEDICINE

## 2025-04-24 PROCEDURE — 1036F TOBACCO NON-USER: CPT | Performed by: INTERNAL MEDICINE

## 2025-04-24 ASSESSMENT — ENCOUNTER SYMPTOMS
BACK PAIN: 1
EYE PAIN: 0
ABDOMINAL PAIN: 0
EYE REDNESS: 0
SHORTNESS OF BREATH: 1

## 2025-04-24 NOTE — PROGRESS NOTES
Physical Therapy: Daily Note   Patient: Gina Kendrick (34 y.o. female)   Examination Date: 2025  Plan of Care/Certification Expiration Date: 25    No data recorded   :  1991 # of Visits since SOC:   Visit count could not be calculated. Make sure you are using a visit which is associated with an episode.   MRN: [id number]  CSN: [id number] Start of Care Date:   No linked episodes   Insurance: Payor: [employer] [place] MEDICAID / Plan: [facility] MEDICA / Product Type: *No Product type* /   Insurance ID: [id number] - (Medicaid Managed) Secondary Insurance (if applicable):    Referring Physician: Kaushik Webb DO Rech   PCP: Bhakti Arnett MD Visits to Date/Visits Approved: 2 / 10    No Show/Cancelled Appts: 0 / 0     Medical Diagnosis: No admission diagnoses are documented for this encounter.    Treatment Diagnosis: Synovitis of B ankles and B knee pain        SUBJECTIVE EXAMINATION       Patient Comments: Subjective: Pt states she just came from rheumatologist. Pt reports ankle pain 5.5/10, knees 0/10 (but with pressure painful) and back pain \"okay\" 4/10. Pt takes Meloxicam daily.    HEP Compliance: Good          TREATMENT     Exercises:      Treatment Reasoning    Exercise 1: GTB DF/INV/EV x 15 H3''  Exercise 4: hooklying SLR H3'' x 10  Exercise 5: pelvic tilt H3'' x 10  Exercise 6: attempted bridge with c/o pain and thus deferred     Dec pain and inc functional str                     Manual Therapy: (CPT 52519) Treatment Reasoning      KT tape I strip to B ankles anchored at plantar surface of foot with superior pull  100% tension to support ankle and dec pain. I strip horizontal across med/lat malleoli 100% tension to support ankle and dec pain                      Pt Education: Additional Comments: KT tape; NO ESTIM per insurance       ASSESSMENT     Assessment: Assessment: Pt progressing with ankle strengthening tolerating GTB with all ther ex with no c/o pain, just

## 2025-04-24 NOTE — PROGRESS NOTES
Reason for Referral:       Bhakti Arnett MD  [address] [place] [address]    Chief Complaint   Patient presents with    Establish Care     Patient here today with inflammation and pain in ankles,hands, knees.           HISTORY OF PRESENT ILLNESS: Ms.Shannon MARCOS Kendrick is a 34 y.o. female referred for evaluation of polyarthralgia.  This patient was seen at the courtesy of Dr. Arnett.  Patient states that she has had chronic diffuse joint pains for many years.  She has chronic low back pain with degenerative disc disease.  She was in her usual state of health until approximately the beginning of March of this year when she experienced some right ankle pain.  She noticed it mainly when she was walking downstairs.  It seemed to progress over about 3 weeks and she sought evaluation for this at a walk-in clinic.  She subsequently was seen about a week later by her PCP and blood work was done.  Acute phase reactants were normal as were her rheumatoid factor.  Her ISIDORO came back just borderline but extractable nuclear antigens, double-stranded DNA and complement levels were normal.  She has been on Mobic which has helped greatly with her pain and subjectively with her swelling.    Has been evaluated by orthopedics and MRI of the right ankle revealed a significant talar tibial joint effusion.  X-ray of her left ankle did not show any soft tissue swelling.  She feels like she has some pain and swelling in the left ankle now.  She also has experienced subjective swelling in her knees which she states is not present today.    Denies any rashes or any erythema on her lower legs that would suggest anything like erythema nodosum.  She has had some shortness of breath.  She is a previous smoker but has been vaping for years now.  She wants to try to quit.  Denies any ocular problems.  No new neurologic complaints.  Does not know of any previous exposures to sick children with high fevers or rashes.  Presents today

## 2025-04-25 ENCOUNTER — HOSPITAL ENCOUNTER (OUTPATIENT)
Dept: MRI IMAGING | Age: 35
Discharge: HOME | End: 2025-04-27
Payer: MEDICAID

## 2025-04-25 DIAGNOSIS — M51.27 HERNIATION OF INTERVERTEBRAL DISC BETWEEN L5 AND S1: ICD-10-CM

## 2025-04-25 PROCEDURE — 72148 MRI LUMBAR SPINE W/O DYE: CPT

## 2025-04-26 LAB
ACE SERPL-CCNC: 17 U/L (ref 16–85)
B BURGDOR.VLSE1+PEPC10 AB SER IA-ACNC: 0.13 IV

## 2025-04-27 ENCOUNTER — RESULTS FOLLOW-UP (OUTPATIENT)
Age: 35
End: 2025-04-27

## 2025-04-29 ENCOUNTER — HOSPITAL ENCOUNTER (OUTPATIENT)
Dept: PHYSICAL THERAPY | Age: 35
Setting detail: THERAPIES SERIES
Discharge: HOME | End: 2025-04-29
Payer: MEDICAID

## 2025-04-29 PROCEDURE — 97140 MANUAL THERAPY 1/> REGIONS: CPT

## 2025-04-29 PROCEDURE — 97110 THERAPEUTIC EXERCISES: CPT

## 2025-04-29 ASSESSMENT — PAIN SCALES - GENERAL: PAINLEVEL_OUTOF10: 4

## 2025-04-29 NOTE — PROGRESS NOTES
Physical Therapy: Daily Note   Patient: Gina Kendrick (34 y.o. female)   Examination Date: 2025  Plan of Care/Certification Expiration Date: 25    No data recorded   :  1991 # of Visits since SOC:   Visit count could not be calculated. Make sure you are using a visit which is associated with an episode.   MRN: [id number]  CSN: [id number] Start of Care Date:   No linked episodes   Insurance: Payor: [employer] [place] MEDICA[employer] / Plan: [employer] [facility] MEDICA / Product Type: *No Product type* /   Insurance ID: [id number] - (Medicaid Managed) Secondary Insurance (if applicable):    Referring Physician: Kaushik Webb DO     PCP: Bhakti Arnett MD Visits to Date/Visits Approved: 3 /    10  No Show/Cancelled Appts: 0 / 0     Medical Diagnosis: No admission diagnoses are documented for this encounter. Synovitis of right and left ankle and B knee pain  Treatment Diagnosis: Synovitis of B ankles and B knee pain        SUBJECTIVE EXAMINATION   Pain Level: Pain Screening  Patient Currently in Pain: Yes  Pain Assessment: 0-10  Pain Level: 4 (3-4)    Patient Comments: Subjective: Pt reports some popping in knee and ankle, to PT with 3-4/10 R ankle R knee    HEP Compliance: Good        OBJECTIVE EXAMINATION       TREATMENT     Exercises:      Treatment Reasoning    Exercise 1: GTB DF/INV/EV x 15 H3''-vcs for form for inv and ev to decrease substitution of lower leg  Exercise 4: hooklying SLR H3'' x 10  Exercise 5: heel toe raises on blue discs slow and controlled with UE support x 10  Exercise 6: standing on blue discs for stability without UE support 30 sec x 3  Exercise 7: supine 90/90 H amstring and calf stretch 15-20 sec x 3  Exercise 8: mini squats x 10 H 3- vcs for form                          Manual Therapy: (CPT 49873) Treatment Reasoning     Joint Mobilization: TC mobs R ankle gr I-III for pain reduction and to improve mobility, STM ATFlig/lat ankle followed by gentle PROM/stretching R

## 2025-04-30 ENCOUNTER — OFFICE VISIT (OUTPATIENT)
Dept: INTERNAL MEDICINE | Age: 35
End: 2025-04-30
Payer: MEDICAID

## 2025-04-30 VITALS
TEMPERATURE: 97.2 F | SYSTOLIC BLOOD PRESSURE: 122 MMHG | BODY MASS INDEX: 37.37 KG/M2 | WEIGHT: 197.8 LBS | OXYGEN SATURATION: 98 % | DIASTOLIC BLOOD PRESSURE: 76 MMHG | HEART RATE: 89 BPM

## 2025-04-30 DIAGNOSIS — M25.50 POLYARTHRALGIA: ICD-10-CM

## 2025-04-30 DIAGNOSIS — Z79.899 MEDICATION MANAGEMENT: ICD-10-CM

## 2025-04-30 DIAGNOSIS — Z72.0 NICOTINE USE: ICD-10-CM

## 2025-04-30 DIAGNOSIS — L70.0 CYSTIC ACNE: ICD-10-CM

## 2025-04-30 DIAGNOSIS — E66.9 OBESITY (BMI 30-39.9): Primary | ICD-10-CM

## 2025-04-30 PROCEDURE — 1036F TOBACCO NON-USER: CPT | Performed by: STUDENT IN AN ORGANIZED HEALTH CARE EDUCATION/TRAINING PROGRAM

## 2025-04-30 PROCEDURE — 99214 OFFICE O/P EST MOD 30 MIN: CPT | Performed by: STUDENT IN AN ORGANIZED HEALTH CARE EDUCATION/TRAINING PROGRAM

## 2025-04-30 PROCEDURE — G8417 CALC BMI ABV UP PARAM F/U: HCPCS | Performed by: STUDENT IN AN ORGANIZED HEALTH CARE EDUCATION/TRAINING PROGRAM

## 2025-04-30 PROCEDURE — G8427 DOCREV CUR MEDS BY ELIG CLIN: HCPCS | Performed by: STUDENT IN AN ORGANIZED HEALTH CARE EDUCATION/TRAINING PROGRAM

## 2025-04-30 RX ORDER — TRETINOIN 0.5 MG/G
CREAM TOPICAL
Qty: 20 G | Refills: 1 | Status: SHIPPED | OUTPATIENT
Start: 2025-04-30 | End: 2025-05-30

## 2025-04-30 RX ORDER — NALTREXONE HYDROCHLORIDE 50 MG/1
50 TABLET, FILM COATED ORAL DAILY
COMMUNITY
Start: 2025-04-29

## 2025-04-30 NOTE — PROGRESS NOTES
Site: Right Upper Arm, Patient Position: Sitting, BP Cuff Size: Large Adult)   Pulse 89   Temp 97.2 °F (36.2 °C) (Infrared)   Wt 89.7 kg (197 lb 12.8 oz)   LMP 04/14/2025   SpO2 98%   BMI 37.37 kg/m²   Physical Exam  Vitals reviewed.   Constitutional:       Appearance: Normal appearance.   HENT:      Head: Normocephalic and atraumatic.      Right Ear: External ear normal.      Left Ear: External ear normal.   Eyes:      Extraocular Movements: Extraocular movements intact.   Neurological:      Mental Status: She is alert.   Psychiatric:         Mood and Affect: Mood normal.         Behavior: Behavior normal.         Thought Content: Thought content normal.         Judgment: Judgment normal.          Physical Exam  Extremities: Swelling has gone down in ankles and knees.    Bhakti Arnett MD  4/30/25  11:34 AM EDT     The patient (or guardian, if applicable) and other individuals in attendance with the patient were advised that Artificial Intelligence will be utilized during this visit to record, process the conversation to generate a clinical note, and support improvement of the AI technology. The patient (or guardian, if applicable) and other individuals in attendance at the appointment consented to the use of AI, including the recording.

## 2025-05-01 ENCOUNTER — HOSPITAL ENCOUNTER (OUTPATIENT)
Dept: PHYSICAL THERAPY | Age: 35
Setting detail: THERAPIES SERIES
Discharge: HOME | End: 2025-05-01
Payer: MEDICAID

## 2025-05-01 PROCEDURE — 97110 THERAPEUTIC EXERCISES: CPT

## 2025-05-01 PROCEDURE — 97140 MANUAL THERAPY 1/> REGIONS: CPT

## 2025-05-01 NOTE — PROGRESS NOTES
Physical Therapy: Daily Note   Patient: Gina Kendrick (34 y.o. female)   Examination Date: 2025  Plan of Care/Certification Expiration Date: 25    No data recorded   :  1991 # of Visits since SOC:   Visit count could not be calculated. Make sure you are using a visit which is associated with an episode.   MRN: [id number]  CSN: [id number] Start of Care Date:   No linked episodes   Insurance: Payor: [employer] [place] MEDICA[employer] / Plan: [employer] [place] MEDICA / Product Type: *No Product type* /   Insurance ID: [id number] - (Medicaid Managed) Secondary Insurance (if applicable):    Referring Physician: Kaushik Webb DO     PCP: Bhakti Arnett MD Visits to Date/Visits Approved: 4 /      No Show/Cancelled Appts: 0 / 0     Medical Diagnosis: No admission diagnoses are documented for this encounter.    Treatment Diagnosis: Synovitis of B ankles and B knee pain        SUBJECTIVE EXAMINATION   Pain Level:      Patient Comments: Subjective: Pt reports pain and swelling better rated 2/10 in B ankles. Pt reports no pain in knees only with squatting and kneeling.    HEP Compliance: Good            TREATMENT     Exercises:      Treatment Reasoning    Exercise 1: BTB DF/INV/EV x 10 H3''-vcs for form for inv and ev to decrease substitution of lower leg  Exercise 4: hooklying SLR H5'' x 10  Exercise 5: heel toe raises on blue discs slow and controlled with UE support x 10 H3''  Exercise 6: standing on blue discs for stability without UE support 30 sec x 3  Exercise 7: standing gastroc stretch H30'' x 3  Exercise 8: mini squats on blue disks; H3'' x 10  Exercise 9: standing hip abduction (standing on blue disk) x 10 H3''  Exercise 10: standing hip extension 0 UE assist H3'' x 10  Exercise 11: heel taps 4'' 2x5  BLE's     Inc functional mobility and dec pain                     Manual Therapy: (CPT 12476) Treatment Reasoning     Other: KT tape to B ankles  I strips to support med/lat ankle joint with

## 2025-05-05 ENCOUNTER — OFFICE VISIT (OUTPATIENT)
Age: 35
End: 2025-05-05
Payer: MEDICAID

## 2025-05-05 VITALS
OXYGEN SATURATION: 96 % | DIASTOLIC BLOOD PRESSURE: 70 MMHG | SYSTOLIC BLOOD PRESSURE: 118 MMHG | WEIGHT: 200 LBS | BODY MASS INDEX: 37.79 KG/M2 | HEART RATE: 78 BPM

## 2025-05-05 DIAGNOSIS — Z51.81 ENCOUNTER FOR MEDICATION MONITORING: ICD-10-CM

## 2025-05-05 DIAGNOSIS — M19.072 ARTHRITIS OF BOTH ANKLES: Primary | ICD-10-CM

## 2025-05-05 DIAGNOSIS — M19.071 ARTHRITIS OF BOTH ANKLES: Primary | ICD-10-CM

## 2025-05-05 PROCEDURE — G8417 CALC BMI ABV UP PARAM F/U: HCPCS | Performed by: INTERNAL MEDICINE

## 2025-05-05 PROCEDURE — 1036F TOBACCO NON-USER: CPT | Performed by: INTERNAL MEDICINE

## 2025-05-05 PROCEDURE — G8427 DOCREV CUR MEDS BY ELIG CLIN: HCPCS | Performed by: INTERNAL MEDICINE

## 2025-05-05 PROCEDURE — 99213 OFFICE O/P EST LOW 20 MIN: CPT | Performed by: INTERNAL MEDICINE

## 2025-05-05 PROCEDURE — 99214 OFFICE O/P EST MOD 30 MIN: CPT | Performed by: INTERNAL MEDICINE

## 2025-05-05 RX ORDER — METHOTREXATE 2.5 MG/1
10 TABLET ORAL WEEKLY
Qty: 16 TABLET | Refills: 0 | Status: SHIPPED | OUTPATIENT
Start: 2025-05-05

## 2025-05-05 RX ORDER — FOLIC ACID 1 MG/1
1 TABLET ORAL DAILY
Qty: 30 TABLET | Refills: 5 | Status: SHIPPED | OUTPATIENT
Start: 2025-05-05

## 2025-05-05 NOTE — PROGRESS NOTES
Reason for Referral:       No referring provider defined for this encounter.    Chief Complaint   Patient presents with    Follow-up     Patient here today for follow up for Arthritis           HISTORY OF PRESENT ILLNESS: Ms.Shannon MARCOS Kendrick is a 34 y.o. female returns today for follow-up regarding her ankle arthritis.    In the interim her lab work was unrevealing for cause of the synovitis in her ankles.  She feels like she is doing better with the meloxicam daily although still has swelling.  Has been going to physical therapy which has been helpful.  She was having some symptoms in her right knee but this is improved a little as well.  Reports no new areas of swelling redness or warmth.    Initial visit:  Patient states that she has had chronic diffuse joint pains for many years. She has chronic low back pain with degenerative disc disease. She was in her usual state of health until approximately the beginning of March of this year when she experienced some right ankle pain. She noticed it mainly when she was walking downstairs. It seemed to progress over about 3 weeks and she sought evaluation for this at a walk-in clinic. She subsequently was seen about a week later by her PCP and blood work was done. Acute phase reactants were normal as were her rheumatoid factor. Her ISIDORO came back just borderline but extractable nuclear antigens, double-stranded DNA and complement levels were normal. She has been on Mobic which has helped greatly with her pain and subjectively with her swelling.       Past Medical,Family, and Social History reviewed.  Patient's PMH/PSH,SH,PSYCH Hx, MEDs, ALLERGIES, and ROS were all reviewed and updated in the appropriate sections.    PAST MEDICAL HISTORY:  Past Medical History:   Diagnosis Date    ADHD     Anxiety     Arthritis     Arthritis     Chronic back pain     Chronic headaches     Depression     Fibromyalgia     Gastritis     HPV in female     Hx of degenerative disc disease

## 2025-05-05 NOTE — PATIENT INSTRUCTIONS
Start folic acid 1 mg every day    Methotrexate 4 tablets every Friday    On for around  the fourth dose of methotrexate (about 4 weeks from now) to blood work again.  These orders have been written.    Continue meloxicam daily with food    Side effects from methotrexate can include oral ulcers, stomach upset or rash.  If you have these please let me know and sometimes we can increase the dose of folic acid to prevent this.    Reevaluate you here again in about 8 weeks and we will do blood work again then to monitor the medication.  Do not drink any alcohol with methotrexate.

## 2025-05-06 ENCOUNTER — HOSPITAL ENCOUNTER (OUTPATIENT)
Dept: PHYSICAL THERAPY | Age: 35
Setting detail: THERAPIES SERIES
Discharge: HOME | End: 2025-05-06
Payer: MEDICAID

## 2025-05-06 ENCOUNTER — TELEPHONE (OUTPATIENT)
Age: 35
End: 2025-05-06

## 2025-05-06 ENCOUNTER — OFFICE VISIT (OUTPATIENT)
Age: 35
End: 2025-05-06
Payer: MEDICAID

## 2025-05-06 VITALS
WEIGHT: 200 LBS | HEART RATE: 93 BPM | HEIGHT: 61 IN | OXYGEN SATURATION: 97 % | TEMPERATURE: 97.5 F | BODY MASS INDEX: 37.76 KG/M2

## 2025-05-06 DIAGNOSIS — M51.27 HERNIATION OF INTERVERTEBRAL DISC BETWEEN L5 AND S1: Primary | ICD-10-CM

## 2025-05-06 DIAGNOSIS — G89.29 CHRONIC PAIN OF RIGHT KNEE: ICD-10-CM

## 2025-05-06 DIAGNOSIS — M25.561 CHRONIC PAIN OF RIGHT KNEE: ICD-10-CM

## 2025-05-06 PROCEDURE — 99213 OFFICE O/P EST LOW 20 MIN: CPT | Performed by: ORTHOPAEDIC SURGERY

## 2025-05-06 PROCEDURE — 20610 DRAIN/INJ JOINT/BURSA W/O US: CPT | Performed by: ORTHOPAEDIC SURGERY

## 2025-05-06 RX ORDER — TRAMADOL HYDROCHLORIDE 50 MG/1
50 TABLET ORAL EVERY 4 HOURS PRN
Qty: 10 TABLET | Refills: 0 | Status: SHIPPED | OUTPATIENT
Start: 2025-05-06 | End: 2025-05-08

## 2025-05-06 RX ORDER — TRIAMCINOLONE ACETONIDE 40 MG/ML
80 INJECTION, SUSPENSION INTRA-ARTICULAR; INTRAMUSCULAR ONCE
Status: COMPLETED | OUTPATIENT
Start: 2025-05-06 | End: 2025-05-06

## 2025-05-06 RX ORDER — LIDOCAINE HYDROCHLORIDE 10 MG/ML
2 INJECTION, SOLUTION INFILTRATION; PERINEURAL ONCE
Status: COMPLETED | OUTPATIENT
Start: 2025-05-06 | End: 2025-05-06

## 2025-05-06 RX ADMIN — LIDOCAINE HYDROCHLORIDE 2 ML: 10 INJECTION, SOLUTION INFILTRATION; PERINEURAL at 12:26

## 2025-05-06 RX ADMIN — TRIAMCINOLONE ACETONIDE 80 MG: 400 INJECTION, SUSPENSION INTRA-ARTICULAR; INTRAMUSCULAR at 12:26

## 2025-05-06 NOTE — TELEPHONE ENCOUNTER
Pt called and said the Tramadol was sent to the wrong pharmacy. Please send the medicine to [facility] in [place].

## 2025-05-06 NOTE — PROGRESS NOTES
Subjective:      Patient ID: Gina Kendrick is a 34 y.o. female who presents today for:  Chief Complaint   Patient presents with    Follow-up     FOLLOW UP SPINE - MRI Results Lumbar Spine  Pain: has been better        Subjective/Objective/Assessment/Plan:     SUBJECTIVE -patient is here today with complaints of low back pain and left-sided radicular complaints.    OBJECTIVE -positive seated straight leg raise left.  Otherwise neurovascularly intact lower extremities.    ASSESSMENT    Diagnosis Orders   1. Herniation of intervertebral disc between L5 and S1  triamcinolone acetonide (KENALOG-40) injection 80 mg    lidocaine 1 % injection 2 mL    DRAIN/INJECT LARGE JOINT/BURSA    traMADol (ULTRAM) 50 MG tablet      2. Chronic pain of right knee  triamcinolone acetonide (KENALOG-40) injection 80 mg    lidocaine 1 % injection 2 mL    DRAIN/INJECT LARGE JOINT/BURSA    traMADol (ULTRAM) 50 MG tablet              PLAN -I am ordering a new MRI of her lumbar spine.  I am also ordering new x-rays of her lumbar spine.  We are planning for a L5-S1 lumbar disc replacement.  I am hopeful that I can get all the disc material out and pressure off of the nerve root from the front.  I want her to see Dr. Choudhury to make sure that he is comfortable approaching her from the front for anterior lumbar disc replacement.  We talked about complications to include blood loss for which she will take blood products, infection, nerve injury, vascular injury, need for more surgery.  We discussed that she has a degenerative disc at L4-5.  Her facet joints at L5-S1 do not look significantly arthritic although there is increased fluid uptake in the facet joints.  Due to her age I think it is better for her to undergo disc replacement rather than an anterior lumbar interbody fusion.    She spoke with my access surgeon and he felt that it be better if we did this from a posterior approach.  She would need L5-S1 left-sided hemilaminectomy versus

## 2025-05-06 NOTE — PROGRESS NOTES
Physical Therapy: Daily Note   Patient: Gina Kendrick (34 y.o. female)   Examination Date: 2025  Plan of Care/Certification Expiration Date: 25    No data recorded   :  1991 # of Visits since SOC:   Visit count could not be calculated. Make sure you are using a visit which is associated with an episode.   MRN: [id number]  CSN: [id number] Start of Care Date:   No linked episodes   Insurance: Payor: [employer] [place] MEDICAID / Plan: [employer] [place] MEDICA / Product Type: *No Product type* /   Insurance ID: [id number] - (Medicaid Managed) Secondary Insurance (if applicable):    Referring Physician: Kaushik Webb DO     PCP: Bhakti Arnett MD Visits to Date/Visits Approved: 4 / 10    No Show/Cancelled Appts: 0      Medical Diagnosis: No admission diagnoses are documented for this encounter.    Treatment Diagnosis: Synovitis of B ankles and B knee pain        SUBJECTIVE EXAMINATION   Pain Level:      Patient Comments: Subjective: Pt cx today d/t having injection today.      Therapy Time  Individual Time In:         Individual Time Out:    Minutes:  0        Electronically signed by Roberta Weaver PTA  on 2025 at 2:14 PM   POC NOTE

## 2025-05-08 ENCOUNTER — HOSPITAL ENCOUNTER (OUTPATIENT)
Dept: PHYSICAL THERAPY | Age: 35
Setting detail: THERAPIES SERIES
Discharge: HOME | End: 2025-05-08
Payer: MEDICAID

## 2025-05-08 PROCEDURE — 97140 MANUAL THERAPY 1/> REGIONS: CPT

## 2025-05-08 PROCEDURE — 97110 THERAPEUTIC EXERCISES: CPT

## 2025-05-08 RX ORDER — TRAMADOL HYDROCHLORIDE 50 MG/1
50 TABLET ORAL EVERY 4 HOURS PRN
Qty: 30 TABLET | Refills: 0 | Status: SHIPPED | OUTPATIENT
Start: 2025-05-08 | End: 2025-05-15

## 2025-05-08 ASSESSMENT — PAIN DESCRIPTION - LOCATION: LOCATION: ANKLE;KNEE

## 2025-05-08 ASSESSMENT — PAIN DESCRIPTION - DESCRIPTORS: DESCRIPTORS: ACHING

## 2025-05-08 ASSESSMENT — PAIN DESCRIPTION - PAIN TYPE: TYPE: ACUTE PAIN;CHRONIC PAIN

## 2025-05-08 ASSESSMENT — PAIN SCALES - GENERAL: PAINLEVEL_OUTOF10: 4

## 2025-05-08 ASSESSMENT — PAIN DESCRIPTION - ORIENTATION: ORIENTATION: RIGHT

## 2025-05-08 NOTE — PROGRESS NOTES
Physical Therapy  Physical Therapy: Daily Note   Patient: Gina Kendrick (34 y.o. female)   Examination Date: 2025  Plan of Care/Certification Expiration Date: 25    No data recorded   :  1991 # of Visits since SOC:   Visit count could not be calculated. Make sure you are using a visit which is associated with an episode.   MRN: [id number]  CSN: [id number] Start of Care Date:   No linked episodes   Insurance: Payor: [employer] [place] [employer] / Plan: [employer] [place] MEDICA / Product Type: *No Product type* /   Insurance ID: [id number] - (Medicaid Managed) Secondary Insurance (if applicable):    Referring Physician: Kaushik Webb DO Rech   PCP: Bhakti Arnett MD Visits to Date/Visits Approved: 5 / 10    No Show/Cancelled Appts: 0 / 1     Medical Diagnosis: Unspecified synovitis and tenosynovitis, left ankle and foot [M65.972]  Unspecified synovitis and tenosynovitis, right ankle and foot [M65.971]    Treatment Diagnosis: Synovitis of B ankles and B knee pain        SUBJECTIVE EXAMINATION   Pain Level: Pain Screening  Patient Currently in Pain: Yes  Pain Assessment: 0-10  Pain Level: 4  Pain Type: Acute pain, Chronic pain  Pain Location: Ankle, Knee  Pain Orientation: Right  Pain Descriptors: Aching    Patient Comments: Subjective: Pt. reports pain level 2/10 in R ankle and 4-5/10 in R knee.    HEP Compliance: Good        OBJECTIVE EXAMINATION   Restrictions:  No data recorded No data recorded No data recorded              TREATMENT     Exercises:      Treatment Reasoning    Exercise 1: BTB DF/INV/EV x 10 H3''-vcs for form for inv and ev to decrease substitution of lower leg  Exercise 5: heel toe raises on blue discs slow and controlled with UE support x 10 H3''  Exercise 8: mini squats on blue disks; H3'' x 10  Exercise 9: standing hip abduction YTB 2 x5  H3''  Exercise 10: standing hip extension YTB x 5 B hold 3 sec                          Manual Therapy: (CPT 97097) Treatment

## 2025-05-12 ENCOUNTER — HOSPITAL ENCOUNTER (OUTPATIENT)
Dept: PHYSICAL THERAPY | Age: 35
Setting detail: THERAPIES SERIES
Discharge: HOME | End: 2025-05-12
Payer: MEDICAID

## 2025-05-12 PROCEDURE — 97110 THERAPEUTIC EXERCISES: CPT

## 2025-05-12 ASSESSMENT — PAIN DESCRIPTION - DESCRIPTORS: DESCRIPTORS: ACHING

## 2025-05-12 ASSESSMENT — PAIN DESCRIPTION - LOCATION: LOCATION: KNEE

## 2025-05-12 ASSESSMENT — PAIN DESCRIPTION - ORIENTATION: ORIENTATION: RIGHT

## 2025-05-12 ASSESSMENT — PAIN DESCRIPTION - PAIN TYPE: TYPE: ACUTE PAIN;CHRONIC PAIN

## 2025-05-12 NOTE — PROGRESS NOTES
Physical Therapy  Physical Therapy: Daily Note   Patient: Gina Kendrick (34 y.o. female)   Examination Date: 2025  Plan of Care/Certification Expiration Date: 25    No data recorded   :  1991 # of Visits since SOC:   Visit count could not be calculated. Make sure you are using a visit which is associated with an episode.   MRN: [id number]  CSN: [id number] Start of Care Date:   No linked episodes   Insurance: Payor: [employer] [place] [employer] / Plan: [employer] [place] MEDICA / Product Type: *No Product type* /   Insurance ID: [id number] - (Medicaid Managed) Secondary Insurance (if applicable):    Referring Physician: Kaushik Webb DO Rech   PCP: Bhakti Arnett MD Visits to Date/Visits Approved: 6 / 10    No Show/Cancelled Appts: 0 /      Medical Diagnosis: Unspecified synovitis and tenosynovitis, left ankle and foot [M65.972]  Unspecified synovitis and tenosynovitis, right ankle and foot [M65.971]    Treatment Diagnosis: Synovitis of B ankles and B knee pain        SUBJECTIVE EXAMINATION   Pain Level: Pain Screening  Patient Currently in Pain: Yes  Pain Assessment: 0-10  Pain Level:  (2-3)  Pain Type: Acute pain, Chronic pain  Pain Location: Knee  Pain Orientation: Right  Pain Descriptors: Aching    Patient Comments: Subjective: Pt. states she is trying to work on body mechanics and reports some difficulty squatting down.  Pt. states she has also been trying to stand with less hyperextension.  KT helping.  Mostly better in ankles some pain in the mornings.    HEP Compliance: Good        OBJECTIVE EXAMINATION   Restrictions:  No data recorded No data recorded No data recorded              TREATMENT     Exercises:      Treatment Reasoning    Exercise 4: supine hamstring stretch hold 30 sec x 3 B  Exercise 5: heel toe raises on blue discs slow and controlled with UE support x 10 H3''  Exercise 6: minisquats on blue disks  x 10 hold 3 sec  Exercise 7: supine L and R HS stretch hold 30

## 2025-05-15 ENCOUNTER — HOSPITAL ENCOUNTER (OUTPATIENT)
Dept: PHYSICAL THERAPY | Age: 35
Setting detail: THERAPIES SERIES
Discharge: HOME | End: 2025-05-15
Payer: MEDICAID

## 2025-05-15 PROCEDURE — 97140 MANUAL THERAPY 1/> REGIONS: CPT

## 2025-05-15 PROCEDURE — 97110 THERAPEUTIC EXERCISES: CPT

## 2025-05-15 ASSESSMENT — PAIN SCALES - GENERAL: PAINLEVEL_OUTOF10: 4

## 2025-05-15 NOTE — PROGRESS NOTES
Physical Therapy: Daily Note   Patient: Gina Kendrick (34 y.o. female)   Examination Date: 05/15/2025  Plan of Care/Certification Expiration Date: 25    No data recorded   :  1991 # of Visits since SOC:   Visit count could not be calculated. Make sure you are using a visit which is associated with an episode.   MRN: [id number]  CSN: [id number] Start of Care Date:   No linked episodes   Insurance: Payor: [employer] [place] [employer] / Plan: [employer] MEDICA / Product Type: *No Product type* /   Insurance ID: [id number] - (Medicaid Managed) Secondary Insurance (if applicable):    Referring Physician: Kaushik Webb DO Rech   PCP: Bhakti Arnett MD Visits to Date/Visits Approved: 7 / 10    No Show/Cancelled Appts: 0      Medical Diagnosis: No admission diagnoses are documented for this encounter.    Treatment Diagnosis: Synovitis of B ankles and B knee pain        SUBJECTIVE EXAMINATION   Pain Level: Pain Screening  Patient Currently in Pain: Yes  Pain Assessment: 0-10  Pain Level: 4 (R ankle (lateral maleoli))    Patient Comments: Subjective: Pt reports more soreness and inc pain with movement in R ankle 4/10 currently but this morning pain 7/10.    HEP Compliance: Good          TREATMENT     Exercises:      Treatment Reasoning    Exercise 6: mini squats BTB H3'' x 10  Exercise 10: BAPS board seated inv/ev/PF/DF; x 10 ea level 5 R ankle  Exercise 11: Nautilus 4 way resisted walking 30# x 5    Limitations addressed: Mobility, Activity tolerance                     Manual Therapy: (CPT 64658) Treatment Reasoning     Other: KT tape to B ankles for support stirrup 50% tension and anchored across ant ankle then up either side 50% tension for support.    Support and dec pain                    Pt Education: Additional Comments: KT tape; NO ESTIM per insurance       ASSESSMENT     Assessment: Assessment: Pt reports 3/10 pain post ther ex. Pt able to progress with strengthening adding

## 2025-05-21 ENCOUNTER — TELEPHONE (OUTPATIENT)
Age: 35
End: 2025-05-21

## 2025-05-21 ENCOUNTER — OFFICE VISIT (OUTPATIENT)
Age: 35
End: 2025-05-21
Payer: MEDICAID

## 2025-05-21 VITALS
BODY MASS INDEX: 36.1 KG/M2 | HEART RATE: 74 BPM | HEIGHT: 61 IN | TEMPERATURE: 97.7 F | WEIGHT: 191.2 LBS | OXYGEN SATURATION: 96 %

## 2025-05-21 DIAGNOSIS — M65.972 SYNOVITIS OF LEFT ANKLE: ICD-10-CM

## 2025-05-21 DIAGNOSIS — M65.971 SYNOVITIS OF RIGHT ANKLE: Primary | ICD-10-CM

## 2025-05-21 PROCEDURE — 1036F TOBACCO NON-USER: CPT | Performed by: STUDENT IN AN ORGANIZED HEALTH CARE EDUCATION/TRAINING PROGRAM

## 2025-05-21 PROCEDURE — 99213 OFFICE O/P EST LOW 20 MIN: CPT | Performed by: STUDENT IN AN ORGANIZED HEALTH CARE EDUCATION/TRAINING PROGRAM

## 2025-05-21 PROCEDURE — 99212 OFFICE O/P EST SF 10 MIN: CPT | Performed by: STUDENT IN AN ORGANIZED HEALTH CARE EDUCATION/TRAINING PROGRAM

## 2025-05-21 PROCEDURE — G8427 DOCREV CUR MEDS BY ELIG CLIN: HCPCS | Performed by: STUDENT IN AN ORGANIZED HEALTH CARE EDUCATION/TRAINING PROGRAM

## 2025-05-21 PROCEDURE — G8417 CALC BMI ABV UP PARAM F/U: HCPCS | Performed by: STUDENT IN AN ORGANIZED HEALTH CARE EDUCATION/TRAINING PROGRAM

## 2025-05-21 ASSESSMENT — ENCOUNTER SYMPTOMS
BACK PAIN: 1
VOMITING: 0
COLOR CHANGE: 0
NAUSEA: 0
SHORTNESS OF BREATH: 0

## 2025-05-21 NOTE — TELEPHONE ENCOUNTER
Pt was in today, 5/21/25, and asked if she could have a new order for more PT sessions for her back. Please advise, thank you.

## 2025-05-21 NOTE — PROGRESS NOTES
Subjective:      Patient ID: Gina Kendrick is a 34 y.o. female who presents today for:  Chief Complaint   Patient presents with    Follow-up     Pt here for 6 week f/u - bilateral ankle pain. She is doing PT and taking meloxicam which was helping with pain. Pt saw rheumatology and was prescribed methotrexate for inflammation, but wants to speak with Dr. Webb about starting this or not. Pain is sharp and  4-5/10 today but swelling has gone down.        HPI  34-year-old female who returns for follow-up evaluation of bilateral ankle pain.  Since last appointment, she has been taking meloxicam and using Voltaren gel routinely which she notes some benefit from.  She has been attending physical therapy which she states helps tremendously.  Her rheumatologic workup has largely been negative to date.  Rheumatology directed patient to begin methotrexate for approximately 1 month and then follow-up for repeat evaluation; however, the patient states she has not started the medication.  Overall she notes improvement in her symptoms.    Past Medical History:   Diagnosis Date    ADHD     Anxiety     Arthritis     Arthritis     Chronic back pain     Chronic headaches     Depression     Fibromyalgia     Gastritis     HPV in female     Hx of degenerative disc disease     Irritable bowel syndrome     PTSD (post-traumatic stress disorder)       Past Surgical History:   Procedure Laterality Date    CHOLECYSTECTOMY      COLONOSCOPY  2021    PAIN MANAGEMENT PROCEDURE N/A 7/9/2024    Caudal epidural steroid injection performed by Thang Phillips DO at [facility] [place]    TONSILLECTOMY      WISDOM TOOTH EXTRACTION       Social History     Socioeconomic History    Marital status: Single     Spouse name: Not on file    Number of children: Not on file    Years of education: Not on file    Highest education level: Not on file   Occupational History    Not on file   Tobacco Use    Smoking status: Former     Current packs/day: 0.00     Average packs/day:

## 2025-05-22 DIAGNOSIS — M54.16 LUMBAR RADICULOPATHY: ICD-10-CM

## 2025-05-22 DIAGNOSIS — M51.27 HERNIATION OF INTERVERTEBRAL DISC BETWEEN L5 AND S1: Primary | ICD-10-CM

## 2025-05-23 RX ORDER — ONDANSETRON 4 MG/1
4 TABLET, ORALLY DISINTEGRATING ORAL EVERY 8 HOURS PRN
COMMUNITY
End: 2025-05-23 | Stop reason: SDUPTHER

## 2025-05-23 NOTE — TELEPHONE ENCOUNTER
Comments:     Last Office Visit (last PCP visit):   4/30/2025    Next Visit Date:  Future Appointments   Date Time Provider Department Center   7/2/2025 10:30 AM Gustavo Carrasquillo,  [facility]       **If hasn't been seen in over a year OR hasn't followed up according to last diabetes/ADHD visit, make appointment for patient before sending refill to provider.    Rx requested:  Requested Prescriptions     Pending Prescriptions Disp Refills    ondansetron (ZOFRAN-ODT) 4 MG disintegrating tablet       Sig: Take 1 tablet by mouth every 8 hours as needed for Nausea or Vomiting

## 2025-05-26 RX ORDER — ONDANSETRON 4 MG/1
4 TABLET, ORALLY DISINTEGRATING ORAL EVERY 8 HOURS PRN
Qty: 30 TABLET | Refills: 0 | Status: SHIPPED | OUTPATIENT
Start: 2025-05-26

## 2025-05-29 ENCOUNTER — TELEPHONE (OUTPATIENT)
Age: 35
End: 2025-05-29

## 2025-05-29 DIAGNOSIS — M54.9 UPPER BACK PAIN: Primary | ICD-10-CM

## 2025-05-29 DIAGNOSIS — G89.29 CHRONIC PAIN OF LEFT KNEE: ICD-10-CM

## 2025-05-29 DIAGNOSIS — M25.561 CHRONIC PAIN OF RIGHT KNEE: ICD-10-CM

## 2025-05-29 DIAGNOSIS — G89.29 CHRONIC PAIN OF RIGHT KNEE: ICD-10-CM

## 2025-05-29 DIAGNOSIS — M54.16 LUMBAR RADICULOPATHY: ICD-10-CM

## 2025-05-29 DIAGNOSIS — M25.562 CHRONIC PAIN OF LEFT KNEE: ICD-10-CM

## 2025-05-29 NOTE — TELEPHONE ENCOUNTER
Patient called back and needs a referral because tramadol is the only rx that gives her relief and she has other issues going on in her life and she fills pain management would help . Please advise

## 2025-05-29 NOTE — TELEPHONE ENCOUNTER
Called and LVM for pt to retrun call for what she needs to see pain management for or send a [employer] message.

## 2025-06-02 ENCOUNTER — HOSPITAL ENCOUNTER (OUTPATIENT)
Dept: PHYSICAL THERAPY | Age: 35
Setting detail: THERAPIES SERIES
Discharge: HOME | End: 2025-06-02
Payer: MEDICAID

## 2025-06-02 PROCEDURE — 97530 THERAPEUTIC ACTIVITIES: CPT

## 2025-06-02 ASSESSMENT — PAIN SCALES - GENERAL: PAINLEVEL_OUTOF10: 3

## 2025-06-02 ASSESSMENT — PAIN DESCRIPTION - DESCRIPTORS: DESCRIPTORS: ACHING

## 2025-06-02 ASSESSMENT — PAIN DESCRIPTION - LOCATION: LOCATION: ANKLE

## 2025-06-02 ASSESSMENT — PAIN DESCRIPTION - ORIENTATION: ORIENTATION: RIGHT;LEFT

## 2025-06-02 ASSESSMENT — PAIN DESCRIPTION - PAIN TYPE: TYPE: ACUTE PAIN

## 2025-06-02 NOTE — PROGRESS NOTES
Physical Therapy: Discharge Note   Patient: Gina Kendrick (34 y.o. female)   Examination Date: 2025  Plan of Care/Certification Expiration Date: 25    Progress Note Counter: DC to HEP; Pt to be evaluated for her back and would like to focus on that at this time     :  1990 # of Visits since SOC:   Visit count could not be calculated. Make sure you are using a visit which is associated with an episode.   MRN: [id number]  CSN: [id number] Start of Care Date:   No linked episodes   Insurance: Payor: [employer] [place] [employer]ICA[employer] / Plan: [employer] [place] / Product Type: *No Product type* /   Insurance ID: [id number] - (Medicaid Managed) Secondary Insurance (if applicable):    Referring Physician: Kaushik Webb DO Re[facility]   PCP: Bhakti Arnett MD Visits to Date/Visits Approved: 8 / 10    No Show/Cancelled Appts: 0 / 1     Medical Diagnosis: No admission diagnoses are documented for this encounter.    Treatment Diagnosis: Synovitis of B ankles and B knee pain        SUBJECTIVE EXAMINATION   Pain Level: Pain Screening  Patient Currently in Pain: Yes  Pain Assessment: 0-10  Pain Level: 3  Pain Type: Acute pain  Pain Location: Ankle  Pain Orientation: Right, Left  Pain Descriptors: Aching    Patient Comments: Subjective: Pt reports right ankle 3/10 and left ankle 2/10    HEP Compliance: Good        OBJECTIVE EXAMINATION   Restrictions:  No data recorded No data recorded No data recorded        Left Strength  Right Strength         Strength LLE  L Hip Flexion: 4+/5  L Hip Extension: 4+/5  L Hip ABduction: 4+/5  L Hip ADduction: 4+/5  L Hip Internal Rotation: 4/5, 4+/5  L Hip External Rotation: 4/5, 4+/5  L Knee Flexion: 4+/5  L Knee Extension: 4+/5  L Ankle Dorsiflexion: 4+/5  L Ankle Plantar Flexion: 4+/5    Strength RLE  R Hip Flexion: 4/5, 4+/5  R Hip Extension: 4/5, 4+/5  R Hip ABduction: 4+/5  R Hip ADduction: 4+/5  R Hip External Rotation: 4/5, 4+/5  R Knee Flexion: 4+/5, 4/5  R Knee

## 2025-06-06 ENCOUNTER — HOSPITAL ENCOUNTER (OUTPATIENT)
Dept: PHYSICAL THERAPY | Age: 35
Setting detail: THERAPIES SERIES
Discharge: HOME | End: 2025-06-06

## 2025-06-06 NOTE — PROGRESS NOTES
Physical Therapy: Daily Note   Patient: Gina Kendrick (34 y.o. female)   Examination Date: 2025  Plan of Care/Certification Expiration Date: 25    Progress Note Counter: DC to HEP; Pt to be evaluated for her back and would like to focus on that at this time     :  1990 # of Visits since SOC:   1   MRN: [id number]  CSN: [id number] Start of Care Date:   2025   Insurance: Payor: [employer] [place] / Plan: [employer] / Product Type: *No Product type* /   Insurance ID: [id number] - (Medicaid Managed) Secondary Insurance (if applicable):    Referring Physician: Thang Phillips DO     PCP: Bhakti Arnett MD Visits to Date/Visits Approved:   /      No Show/Cancelled Appts:   / 1     Medical Diagnosis: Herniation of intervertebral disc between L5 and S1 [M51.27]  Lumbar radiculopathy [M54.16]    Treatment Diagnosis: Synovitis of B ankles and B knee pain        SUBJECTIVE EXAMINATION   Patient Comments: Subjective: Pt called to cx eval for this date: \"too much going on.\"    Therapy Time  Individual Time In:         Individual Time Out:    Minutes:  0 cx eval        Electronically signed by Laura Anguiano PT  on 2025 at 8:55 AM   POC NOTE

## 2025-06-10 ENCOUNTER — HOSPITAL ENCOUNTER (OUTPATIENT)
Dept: PHYSICAL THERAPY | Age: 35
Setting detail: THERAPIES SERIES
Discharge: HOME | End: 2025-06-10
Payer: MEDICAID

## 2025-06-10 NOTE — PROGRESS NOTES
Physical Therapy  Physical Therapy: Daily Note   Patient: Gina Kendrick (34 y.o. female)   Examination Date: 06/10/2025  Plan of Care/Certification Expiration Date: 25    Progress Note Counter: DC to HEP; Pt to be evaluated for her back and would like to focus on that at this time     :  1990 # of Visits since SOC:   Visit count could not be calculated. Make sure you are using a visit which is associated with an episode.   MRN: [id number]  CSN: [id number] Start of Care Date:   No linked episodes   Insurance: Payor: [employer] [place] / Plan: [employer] [place] / Product Type: *No Product type* /   Insurance ID: [id number] - (Medicaid Managed) Secondary Insurance (if applicable):    Referring Physician: Kaushik Webb DO [facility]   PCP: Bhakti Arnett MD Visits to Date/Visits Approved: 8 / 10    No Show/Cancelled Appts: 0 / 2     Medical Diagnosis: No admission diagnoses are documented for this encounter. Synovitis of right and left ankle and B knee pain  Treatment Diagnosis: Synovitis of B ankles and B knee pain        SUBJECTIVE EXAMINATION   Pain Level:      Patient Comments: Subjective: Pt. cancelled appointment.    HEP Compliance:         OBJECTIVE EXAMINATION   Restrictions:  No data recorded No data recorded No data recorded              TREATMENT         Pt Education:         ASSESSMENT     Assessment: Assessment: Pt. called and cancelled appointment.       Post-Treatment Pain Level:      No data recorded  Therapy Prognosis: Good       GOALS      Short Term Goals Completed by   Current Status Goal Status                                                                                       Long Term Goals Completed by   Current Status Goal Status                                                                                        TREATMENT PLAN               Therapy Time  Individual Time In:   345      Individual Time Out:    Minutes:  0      Pt. Cancelled appointment.

## 2025-06-12 ENCOUNTER — APPOINTMENT (OUTPATIENT)
Dept: PHYSICAL THERAPY | Age: 35
End: 2025-06-12
Payer: MEDICAID

## 2025-06-16 ENCOUNTER — HOSPITAL ENCOUNTER (OUTPATIENT)
Age: 35
Discharge: HOME | End: 2025-06-18
Payer: MEDICAID

## 2025-06-16 ENCOUNTER — HOSPITAL ENCOUNTER (OUTPATIENT)
Dept: GENERAL RADIOLOGY | Age: 35
Discharge: HOME | End: 2025-06-18
Payer: MEDICAID

## 2025-06-16 ENCOUNTER — OFFICE VISIT (OUTPATIENT)
Dept: ORTHOPEDIC SURGERY | Age: 35
End: 2025-06-16
Payer: MEDICAID

## 2025-06-16 VITALS
HEART RATE: 89 BPM | OXYGEN SATURATION: 98 % | WEIGHT: 191 LBS | TEMPERATURE: 97.9 F | HEIGHT: 61 IN | BODY MASS INDEX: 36.06 KG/M2

## 2025-06-16 DIAGNOSIS — M79.671 BILATERAL FOOT PAIN: ICD-10-CM

## 2025-06-16 DIAGNOSIS — M19.072 ARTHRITIS OF BOTH MIDFEET: Primary | ICD-10-CM

## 2025-06-16 DIAGNOSIS — M79.672 BILATERAL FOOT PAIN: ICD-10-CM

## 2025-06-16 DIAGNOSIS — M19.071 ARTHRITIS OF BOTH MIDFEET: Primary | ICD-10-CM

## 2025-06-16 DIAGNOSIS — M72.2 PLANTAR FASCIITIS: ICD-10-CM

## 2025-06-16 PROCEDURE — 1036F TOBACCO NON-USER: CPT | Performed by: STUDENT IN AN ORGANIZED HEALTH CARE EDUCATION/TRAINING PROGRAM

## 2025-06-16 PROCEDURE — 99214 OFFICE O/P EST MOD 30 MIN: CPT | Performed by: STUDENT IN AN ORGANIZED HEALTH CARE EDUCATION/TRAINING PROGRAM

## 2025-06-16 PROCEDURE — 73630 X-RAY EXAM OF FOOT: CPT

## 2025-06-16 PROCEDURE — G8417 CALC BMI ABV UP PARAM F/U: HCPCS | Performed by: STUDENT IN AN ORGANIZED HEALTH CARE EDUCATION/TRAINING PROGRAM

## 2025-06-16 PROCEDURE — G8427 DOCREV CUR MEDS BY ELIG CLIN: HCPCS | Performed by: STUDENT IN AN ORGANIZED HEALTH CARE EDUCATION/TRAINING PROGRAM

## 2025-06-16 NOTE — PROGRESS NOTES
Subjective:      Patient ID: Gina Kendrick is a 34 y.o. female who presents today for:  Chief Complaint   Patient presents with    New Patient     Bilat ankles/feet .   No recent injury but has had previous injury's to dropping things on her feet .   Xray today       HPI  34-year-old female presents with complaints of bilateral foot pain.  She was seen approximately 1 month ago for complaints of bilateral ankle pain and has been in physical therapy and taking meloxicam with improvement in those symptoms.  She has had progressively worsening pain to both feet that she localizes to her midfoot.  She states the right is worse than the left.  The pain is constant, moderate to severe, exacerbated with activity, and remitted at rest.    Past Medical History:   Diagnosis Date    ADHD     Anxiety     Arthritis     Arthritis     Chronic back pain     Chronic headaches     Depression     Fibromyalgia     Gastritis     HPV in female     Hx of degenerative disc disease     Irritable bowel syndrome     PTSD (post-traumatic stress disorder)       Past Surgical History:   Procedure Laterality Date    CHOLECYSTECTOMY      COLONOSCOPY  2021    PAIN MANAGEMENT PROCEDURE N/A 7/9/2024    Caudal epidural steroid injection performed by Thang Phillips DO at [facility] OR    TONSILLECTOMY      WISDOM TOOTH EXTRACTION       Social History     Socioeconomic History    Marital status: Single     Spouse name: Not on file    Number of children: Not on file    Years of education: Not on file    Highest education level: Not on file   Occupational History    Not on file   Tobacco Use    Smoking status: Former     Current packs/day: 0.00     Average packs/day: 0.3 packs/day for 10.0 years (2.5 ttl pk-yrs)     Types: Cigarettes     Start date: 1/1/2005     Quit date: 1/1/2015     Years since quitting: 10.4     Passive exposure: Never    Smokeless tobacco: Former    Tobacco comments:     Still use a vape with nicotine   Vaping Use    Vaping status: Former

## 2025-06-17 ASSESSMENT — ENCOUNTER SYMPTOMS
VOMITING: 0
NAUSEA: 0
SHORTNESS OF BREATH: 0
COLOR CHANGE: 0

## 2025-06-18 ENCOUNTER — OFFICE VISIT (OUTPATIENT)
Dept: INTERNAL MEDICINE | Age: 35
End: 2025-06-18
Payer: MEDICAID

## 2025-06-18 VITALS
OXYGEN SATURATION: 98 % | TEMPERATURE: 97.1 F | BODY MASS INDEX: 33.23 KG/M2 | DIASTOLIC BLOOD PRESSURE: 68 MMHG | HEIGHT: 62 IN | HEART RATE: 83 BPM | SYSTOLIC BLOOD PRESSURE: 116 MMHG | WEIGHT: 180.6 LBS

## 2025-06-18 DIAGNOSIS — M19.071 ARTHRITIS OF BOTH MIDFEET: ICD-10-CM

## 2025-06-18 DIAGNOSIS — E66.9 OBESITY (BMI 30-39.9): ICD-10-CM

## 2025-06-18 DIAGNOSIS — Z86.2 HX OF IRON DEFICIENCY ANEMIA: ICD-10-CM

## 2025-06-18 DIAGNOSIS — F32.81 PMDD (PREMENSTRUAL DYSPHORIC DISORDER): ICD-10-CM

## 2025-06-18 DIAGNOSIS — Z87.898 H/O DOMESTIC VIOLENCE: ICD-10-CM

## 2025-06-18 DIAGNOSIS — E66.9 OBESITY (BMI 30-39.9): Primary | ICD-10-CM

## 2025-06-18 DIAGNOSIS — M19.072 ARTHRITIS OF BOTH MIDFEET: ICD-10-CM

## 2025-06-18 DIAGNOSIS — R11.0 NAUSEA: ICD-10-CM

## 2025-06-18 LAB
BASOPHILS # BLD: 0 K/UL (ref 0–0.2)
BASOPHILS NFR BLD: 0.6 %
EOSINOPHIL # BLD: 0.1 K/UL (ref 0–0.7)
EOSINOPHIL NFR BLD: 1.2 %
ERYTHROCYTE [DISTWIDTH] IN BLOOD BY AUTOMATED COUNT: 12.3 % (ref 11.5–14.5)
HCT VFR BLD AUTO: 38.5 % (ref 37–47)
HGB BLD-MCNC: 12.9 G/DL (ref 12–16)
LYMPHOCYTES # BLD: 1.1 K/UL (ref 1–4.8)
LYMPHOCYTES NFR BLD: 22.3 %
MCH RBC QN AUTO: 30.6 PG (ref 27–31.3)
MCHC RBC AUTO-ENTMCNC: 33.5 % (ref 33–37)
MCV RBC AUTO: 91.4 FL (ref 79.4–94.8)
MONOCYTES # BLD: 0.4 K/UL (ref 0.2–0.8)
MONOCYTES NFR BLD: 8.7 %
NEUTROPHILS # BLD: 3.4 K/UL (ref 1.4–6.5)
NEUTS SEG NFR BLD: 67 %
PLATELET # BLD AUTO: 366 K/UL (ref 130–400)
RBC # BLD AUTO: 4.21 M/UL (ref 4.2–5.4)
WBC # BLD AUTO: 5.1 K/UL (ref 4.8–10.8)

## 2025-06-18 PROCEDURE — G8417 CALC BMI ABV UP PARAM F/U: HCPCS | Performed by: STUDENT IN AN ORGANIZED HEALTH CARE EDUCATION/TRAINING PROGRAM

## 2025-06-18 PROCEDURE — G8427 DOCREV CUR MEDS BY ELIG CLIN: HCPCS | Performed by: STUDENT IN AN ORGANIZED HEALTH CARE EDUCATION/TRAINING PROGRAM

## 2025-06-18 PROCEDURE — 99214 OFFICE O/P EST MOD 30 MIN: CPT | Performed by: STUDENT IN AN ORGANIZED HEALTH CARE EDUCATION/TRAINING PROGRAM

## 2025-06-18 PROCEDURE — 1036F TOBACCO NON-USER: CPT | Performed by: STUDENT IN AN ORGANIZED HEALTH CARE EDUCATION/TRAINING PROGRAM

## 2025-06-18 RX ORDER — TRAZODONE HYDROCHLORIDE 300 MG/1
300 TABLET ORAL NIGHTLY
COMMUNITY
Start: 2025-05-27

## 2025-06-18 RX ORDER — LISDEXAMFETAMINE DIMESYLATE 30 MG/1
30 CAPSULE ORAL EVERY MORNING
COMMUNITY
Start: 2025-06-11

## 2025-06-18 RX ORDER — ONDANSETRON 4 MG/1
4 TABLET, ORALLY DISINTEGRATING ORAL EVERY 8 HOURS PRN
Qty: 30 TABLET | Refills: 1 | Status: SHIPPED | OUTPATIENT
Start: 2025-06-18 | End: 2025-06-18 | Stop reason: SDUPTHER

## 2025-06-18 RX ORDER — ONDANSETRON 4 MG/1
4 TABLET, ORALLY DISINTEGRATING ORAL EVERY 8 HOURS PRN
Qty: 30 TABLET | Refills: 1 | Status: SHIPPED | OUTPATIENT
Start: 2025-06-18

## 2025-06-18 RX ORDER — FAMOTIDINE 40 MG/1
40 TABLET, FILM COATED ORAL DAILY
Qty: 90 TABLET | Refills: 0 | Status: SHIPPED | OUTPATIENT
Start: 2025-06-18 | End: 2025-06-18 | Stop reason: SDUPTHER

## 2025-06-18 RX ORDER — FAMOTIDINE 40 MG/1
40 TABLET, FILM COATED ORAL DAILY
Qty: 90 TABLET | Refills: 0 | Status: SHIPPED | OUTPATIENT
Start: 2025-06-18

## 2025-06-18 ASSESSMENT — ENCOUNTER SYMPTOMS
SHORTNESS OF BREATH: 0
ABDOMINAL PAIN: 0

## 2025-06-18 NOTE — TELEPHONE ENCOUNTER
Comments: went to wrong pharmacy    Last Office Visit (last PCP visit):   6/18/2025    Next Visit Date:  Future Appointments   Date Time Provider Department Center   6/30/2025  3:30 PM Davis Mcneill MD [facility]   7/2/2025 10:30 AM Gustavo Carrasquillo DO [facility]       **If hasn't been seen in over a year OR hasn't followed up according to last diabetes/ADHD visit, make appointment for patient before sending refill to provider.    Rx requested:  Requested Prescriptions     Pending Prescriptions Disp Refills    famotidine (PEPCID) 40 MG tablet 90 tablet 0     Sig: Take 1 tablet by mouth daily    ondansetron (ZOFRAN-ODT) 4 MG disintegrating tablet 30 tablet 1     Sig: Take 1 tablet by mouth every 8 hours as needed for Nausea or Vomiting

## 2025-06-18 NOTE — PROGRESS NOTES
[facility]  [address] [place] [id number]  Dept: [phone]  Dept Fax: [phone]  Loc: [phone]     Visit type: Established patient  Reason for Visit: Weight Management (Follow up. Currently taking Semaglutide)    Assessment/Plan   1. Obesity (BMI 30-39.9)  -     Semaglutide,0.25 or 0.5MG/DOS, 2 MG/1.5ML SOPN; Semaglutide Base 0.6 Niacinamide 0.5 mg Methylcobalmin 2mcg/0.5ml Inject 0.5 ml (0.6 mg) once weekly, Disp-2 mL, R-2Normal  -     CBC with Auto Differential; Future  -     Iron and TIBC; Future  -     Ferritin; Future  2. Nausea  3. PMDD (premenstrual dysphoric disorder)  4. Hx of iron deficiency anemia  5. Arthritis of both midfeet      Assessment & Plan  Obesity   Chronic, uncontrolled   - Weight loss has plateaued, necessitating an increase in semaglutide dosage from 0.3 mg to 0.6 mg.  - Continue current regimen of semaglutide and monitor for any adverse effects.  - Contact clinic if significant side effects occur.  - Follow-up in 3 months to assess progress and adjust treatment as necessary.    Nausea:  - Experiences nausea predominantly in the morning post-medication and extending into the early afternoon.  - Prescription for Zofran will be provided for use as needed.  - Omeprazole prescribed for daily morning use, with a 90-day supply of Pepcid for use as needed.  - Discontinue Pepcid and inform clinic if daily use becomes necessary.    Premenstrual dysphoric disorder (PMDD):  - Symptoms suggestive of PMDD rather than PCOS.  - Initial treatment approach for PMDD is birth control, followed by SSRIs (currently taking duloxetine).  - patient would like to avoid OCPs, continue duloxetine   - No additional blood work for PMDD necessary at this time.    Hx Anemia:  - Complete blood count (CBC)and iron will be ordered to assess for potential anemia.    Early onset midfoot arthritis:  - Diagnosed with early onset midfoot arthritis and

## 2025-06-19 ENCOUNTER — RESULTS FOLLOW-UP (OUTPATIENT)
Dept: INTERNAL MEDICINE | Age: 35
End: 2025-06-19

## 2025-06-19 LAB
FERRITIN: 40 NG/ML (ref 15–150)
IRON % SATURATION: 20 % (ref 20–55)
IRON: 61 UG/DL (ref 37–145)
TOTAL IRON BINDING CAPACITY: 300 UG/DL (ref 250–450)
UNSATURATED IRON BINDING CAPACITY: 239 UG/DL (ref 112–347)

## 2025-06-27 NOTE — PROGRESS NOTES
Gina Kendrick  (1990)    6/30/2025    Subjective:     Gina Kendrick is 34 y.o. female who complains today of:    Chief Complaint   Patient presents with    Consultation    Back Pain     Lower       iGna Kendrick is a 34 y.o. female who presents for evaluation by request of Dr. Thang Phillips for lumbar radiculopathy.    She has struggled with pain for over 16 years. She denies any immediately-preceding traumatic or inciting events. She has been previously evaluated by Dr. Phillips whose records are reviewed below. She describes pain located in both sides of her low back with pain down her left leg.  Pain is a constant ache and is currently a 7/10 and gets up to a 9/10 at its worst and goes down to a 6/10 at its best on NRS pain scale. Pain is worse with activity and playing with her children. Pain is better with standing and laying.  Pain is located 40% on the right and 60% on the left. Pain is located 50% in the back and 50% in the legs.    She denies any numbness, tingling, weakness, bowel or bladder dysfunction, saddle anesthesia, falls, history of cancer, unexplained weight loss, persistent night pain and sweats, fever, IV drug abuse, immunocompromise, chronic prednisone or antibiotic use, or any other red flag symptoms. Mood is down, denies any suicidal or homicidal ideation. Sleep is poor, awakes fatigued.    She has tried:  Home exercise program with minimal relief    Diagnostic testing previously performed includes XRs and MRI    Medications tried include:  Acetaminophen with minimal relief for over 3 months  Ibuprofen with minimal relief for over 3 months  Gabapentin 600 mg BID some relief  Meloxicam helps    Allergies, Medications, Past Medical History, Family History, Social History, Work History, and Review of Systems reviewed below     +Depression and Anxiety on Cymbalta Duloxetine   +PTSD     No Seizures, Epilepsy or Brain Surgery     Spends her time: she is a single mom, takes care

## 2025-06-30 ENCOUNTER — INITIAL CONSULT (OUTPATIENT)
Age: 35
End: 2025-06-30
Payer: MEDICAID

## 2025-06-30 VITALS
HEART RATE: 92 BPM | OXYGEN SATURATION: 96 % | SYSTOLIC BLOOD PRESSURE: 108 MMHG | HEIGHT: 62 IN | TEMPERATURE: 97.6 F | DIASTOLIC BLOOD PRESSURE: 74 MMHG | WEIGHT: 180 LBS | BODY MASS INDEX: 33.13 KG/M2

## 2025-06-30 DIAGNOSIS — M79.605 BILATERAL LEG PAIN: ICD-10-CM

## 2025-06-30 DIAGNOSIS — F43.10 PTSD (POST-TRAUMATIC STRESS DISORDER): ICD-10-CM

## 2025-06-30 DIAGNOSIS — M79.604 BILATERAL LEG PAIN: ICD-10-CM

## 2025-06-30 DIAGNOSIS — Z87.891 PERSONAL HISTORY OF TOBACCO USE, PRESENTING HAZARDS TO HEALTH: ICD-10-CM

## 2025-06-30 DIAGNOSIS — F17.200 TOBACCO DEPENDENCE SYNDROME: ICD-10-CM

## 2025-06-30 DIAGNOSIS — M47.817 LUMBOSACRAL SPONDYLOSIS WITHOUT MYELOPATHY: Primary | ICD-10-CM

## 2025-06-30 PROCEDURE — G8427 DOCREV CUR MEDS BY ELIG CLIN: HCPCS | Performed by: PHYSICAL MEDICINE & REHABILITATION

## 2025-06-30 PROCEDURE — 99406 BEHAV CHNG SMOKING 3-10 MIN: CPT | Performed by: PHYSICAL MEDICINE & REHABILITATION

## 2025-06-30 PROCEDURE — 99214 OFFICE O/P EST MOD 30 MIN: CPT | Performed by: PHYSICAL MEDICINE & REHABILITATION

## 2025-06-30 PROCEDURE — G8417 CALC BMI ABV UP PARAM F/U: HCPCS | Performed by: PHYSICAL MEDICINE & REHABILITATION

## 2025-06-30 PROCEDURE — 99244 OFF/OP CNSLTJ NEW/EST MOD 40: CPT | Performed by: PHYSICAL MEDICINE & REHABILITATION

## 2025-06-30 RX ORDER — LIDOCAINE 40 MG/G
CREAM TOPICAL
Qty: 45 G | Refills: 1 | Status: SHIPPED | OUTPATIENT
Start: 2025-06-30

## 2025-07-01 ENCOUNTER — TELEPHONE (OUTPATIENT)
Dept: PHARMACY | Facility: CLINIC | Age: 35
End: 2025-07-01

## 2025-07-01 NOTE — TELEPHONE ENCOUNTER
CLINICAL PHARMACY NOTE - [facility] Pharm[facility] Referral    Patient can be scheduled with:  Team Schedule- General Referrals, etc.    Received a referral from: Provider: Davis Mcneill MD to discuss patient’s medications. Called patient to schedule a time to speak with a pharmacist over the telephone.   Spoke to patient and advised them of the above message.  Patient verified understanding and scheduled their appointment: tomorrow at 10AM      Lise Del Rosario [facility].   [facility] Clinical   [facility]  Toll free: [phone] Option 1    For Pharmacy Admin Tracking Only    Program: [facility]  CPA in place:  No  Recommendation Provided To: Patient/Caregiver: 1 via Telephone  Intervention Detail: Scheduled Appointment  Intervention Accepted By: Patient/Caregiver: 1  Gap Closed?: Yes   Time Spent (min): 10

## 2025-07-02 RX ORDER — NICOTINE 21 MG/24HR
1 PATCH, TRANSDERMAL 24 HOURS TRANSDERMAL DAILY
Qty: 14 PATCH | Refills: 0 | Status: SHIPPED | OUTPATIENT
Start: 2025-07-02 | End: 2025-07-16

## 2025-07-02 NOTE — TELEPHONE ENCOUNTER
Nicotine 14 mg patch #14, nicotine 7 mg patch #14, and nicotine 2 mg gum #110 3 refills provided  
vaccine (1 of 3 - 19+ 3-dose series) Never done    Cervical cancer screen  Never done    COVID-19 Vaccine (1 - 2024-25 season) Never done      BP Readings from Last 3 Encounters:   06/30/25 108/74   06/18/25 116/68   05/05/25 118/70          Component Value Date/Time    LABA1C 5.2 02/27/2025 1039    LABGLOM >90.0 04/24/2025 0911    LABGLOM >90.0 03/25/2024 1205    LDL 78 02/27/2025 1037      No results found for: \"ALBCRURPOC\", \"ALBCREAT\"     Rebeca Camraillo, PharmD, [facility]  [facility]  [facility] Clinical Pharmacist  Department: [phone]     For Pharmacy Admin Tracking Only    Program: [employer]  CPA in place:  No  Recommendation Provided To: Provider: 2 via Note to Provider and Patient/Caregiver: 1 via Telephone  Intervention Detail: New Rx: 2, reason: Needs Additional Therapy  Intervention Accepted By: Provider: 2 and Patient/Caregiver: 1  Gap Closed?: Yes   Time Spent (min): 30

## 2025-07-09 ENCOUNTER — TELEPHONE (OUTPATIENT)
Age: 35
End: 2025-07-09

## 2025-07-09 NOTE — TELEPHONE ENCOUNTER
Patient called in regarding her back & foot issues.   Patient is starting a new job and would like a letter to give them about their weight restrictions and what the patient is able to perform on a daily basis.     I let the patient know someone from our office will give her a call when this has been completed.

## 2025-07-10 ENCOUNTER — HOSPITAL ENCOUNTER (OUTPATIENT)
Age: 35
Discharge: HOME | End: 2025-07-10
Payer: MEDICAID

## 2025-07-10 VITALS
HEART RATE: 108 BPM | BODY MASS INDEX: 33.99 KG/M2 | DIASTOLIC BLOOD PRESSURE: 80 MMHG | SYSTOLIC BLOOD PRESSURE: 110 MMHG | TEMPERATURE: 97.9 F | HEIGHT: 61 IN | OXYGEN SATURATION: 97 % | WEIGHT: 180 LBS

## 2025-07-10 DIAGNOSIS — M47.817 LUMBOSACRAL SPONDYLOSIS WITHOUT MYELOPATHY: Primary | ICD-10-CM

## 2025-07-10 DIAGNOSIS — R52 PAIN: ICD-10-CM

## 2025-07-10 PROCEDURE — 64493 INJ PARAVERT F JNT L/S 1 LEV: CPT | Performed by: PHYSICAL MEDICINE & REHABILITATION

## 2025-07-10 PROCEDURE — 2500000003 HC RX 250 WO HCPCS: Performed by: PHYSICAL MEDICINE & REHABILITATION

## 2025-07-10 PROCEDURE — 64494 INJ PARAVERT F JNT L/S 2 LEV: CPT | Performed by: PHYSICAL MEDICINE & REHABILITATION

## 2025-07-10 PROCEDURE — 6360000002 HC RX W HCPCS: Performed by: PHYSICAL MEDICINE & REHABILITATION

## 2025-07-10 RX ORDER — BETAMETHASONE SODIUM PHOSPHATE AND BETAMETHASONE ACETATE 3; 3 MG/ML; MG/ML
6 INJECTION, SUSPENSION INTRA-ARTICULAR; INTRALESIONAL; INTRAMUSCULAR; SOFT TISSUE ONCE
Status: COMPLETED | OUTPATIENT
Start: 2025-07-10 | End: 2025-07-10

## 2025-07-10 RX ORDER — INDOMETHACIN 25 MG/1
1 CAPSULE ORAL ONCE
Status: COMPLETED | OUTPATIENT
Start: 2025-07-10 | End: 2025-07-10

## 2025-07-10 RX ORDER — LIDOCAINE HYDROCHLORIDE 10 MG/ML
4 INJECTION, SOLUTION EPIDURAL; INFILTRATION; INTRACAUDAL; PERINEURAL ONCE
Status: COMPLETED | OUTPATIENT
Start: 2025-07-10 | End: 2025-07-10

## 2025-07-10 RX ADMIN — LIDOCAINE HYDROCHLORIDE 4 ML: 10 INJECTION, SOLUTION EPIDURAL; INFILTRATION; INTRACAUDAL; PERINEURAL at 12:37

## 2025-07-10 RX ADMIN — SODIUM BICARBONATE 1 MEQ: 84 INJECTION, SOLUTION INTRAVENOUS at 12:35

## 2025-07-10 RX ADMIN — BETAMETHASONE SODIUM PHOSPHATE AND BETAMETHASONE ACETATE 6 MG: 3; 3 INJECTION, SUSPENSION INTRA-ARTICULAR; INTRALESIONAL; INTRAMUSCULAR at 12:37

## 2025-07-10 ASSESSMENT — PAIN DESCRIPTION - LOCATION: LOCATION: BACK

## 2025-07-10 ASSESSMENT — PAIN SCALES - GENERAL
PAINLEVEL_OUTOF10: 7
PAINLEVEL_OUTOF10: 5

## 2025-07-10 NOTE — PROCEDURES
Lumbar Facet Zygapophyseal Joint Injections             Patient Name: Gina Kendrick   : 1990  Date: 7/10/2025     Provider: Davis Mcneill MD      Gina Kendrick is here today for interventional pain management. Preoperatively, the patient presents with symptoms and physical exam findings consistent with lumbar facet zygapophyseal joint mediated pain. She has had persistent pain that limits her function and activities of daily living. The pain is persistent despite conservative measures. She has significant functional and psychological impairment due to this condition. Given her symptoms, physical exam findings, impairment in activities of daily living, and lack of response to conservative measures, consideration for lumbar facet zygapophyseal joint corticosteroid injections was given. Discussed the risks of the procedure including, but not limited to, bleeding, infection, worsened pain, damage to surrounding structures, side effects, toxicity, allergic reactions to medications used, immune and stress-response dysfunction, fat necrosis, avascular necrosis, skin pigmentation changes, blood sugar elevation, headache, vision changes, need for surgery, as well as catastrophic injury such as vision loss, paralysis, stroke, spinal cord infarction or injury, intrathecal injection, spinal cord puncture, arachnoiditis, bowel or bladder incontinence, loss of use of the legs, ventilator dependence, and death. Discussed the risks, benefits, alternative procedures, and alternatives to the procedure including no procedure at all. Discussed that we cannot undo any permanent neurologic damage or change the course of any underlying disease. After thorough discussion, patient expressed understanding and willingness to proceed. Written consent was obtained and is in the chart. Verbal consent to proceed was obtained.    Standard ASI guidelines were followed and sterile technique used.  Area was cleaned with Betadine

## 2025-07-10 NOTE — TELEPHONE ENCOUNTER
Called and spoke to patient, she stated that she just wants a 25 pound weight restriction due to an ongoing back condition.

## 2025-07-11 ENCOUNTER — TELEPHONE (OUTPATIENT)
Age: 35
End: 2025-07-11

## 2025-07-11 NOTE — TELEPHONE ENCOUNTER
Called and spoke to patient, let her know that the note has been written and that it will be at the  to  at her convenience.     Letter was placed at the .

## 2025-08-22 ENCOUNTER — OFFICE VISIT (OUTPATIENT)
Dept: INTERNAL MEDICINE | Age: 35
End: 2025-08-22
Payer: MEDICAID

## 2025-08-22 VITALS
BODY MASS INDEX: 31.68 KG/M2 | HEART RATE: 104 BPM | WEIGHT: 167.8 LBS | OXYGEN SATURATION: 99 % | DIASTOLIC BLOOD PRESSURE: 84 MMHG | HEIGHT: 61 IN | SYSTOLIC BLOOD PRESSURE: 128 MMHG | TEMPERATURE: 97.1 F

## 2025-08-22 DIAGNOSIS — G43.809 OTHER MIGRAINE WITHOUT STATUS MIGRAINOSUS, NOT INTRACTABLE: ICD-10-CM

## 2025-08-22 DIAGNOSIS — L70.0 CYSTIC ACNE VULGARIS: Primary | ICD-10-CM

## 2025-08-22 PROCEDURE — G8417 CALC BMI ABV UP PARAM F/U: HCPCS | Performed by: NURSE PRACTITIONER

## 2025-08-22 PROCEDURE — 1036F TOBACCO NON-USER: CPT | Performed by: NURSE PRACTITIONER

## 2025-08-22 PROCEDURE — G8427 DOCREV CUR MEDS BY ELIG CLIN: HCPCS | Performed by: NURSE PRACTITIONER

## 2025-08-22 PROCEDURE — 99213 OFFICE O/P EST LOW 20 MIN: CPT | Performed by: NURSE PRACTITIONER

## 2025-08-22 RX ORDER — BENZOCAINE/MENTHOL 6 MG-10 MG
LOZENGE MUCOUS MEMBRANE
COMMUNITY
Start: 2025-08-20

## 2025-08-22 RX ORDER — TRETINOIN 0.25 MG/G
GEL TOPICAL
Qty: 45 G | Refills: 3 | Status: SHIPPED | OUTPATIENT
Start: 2025-08-22

## 2025-08-22 RX ORDER — ERYTHROMYCIN 20 MG/G
GEL TOPICAL
Qty: 60 G | Refills: 3 | Status: SHIPPED | OUTPATIENT
Start: 2025-08-22

## 2025-08-22 RX ORDER — ASCORBIC ACID 500 MG
500 TABLET ORAL DAILY
Qty: 30 TABLET | Refills: 2 | Status: SHIPPED | OUTPATIENT
Start: 2025-08-22 | End: 2025-09-21

## 2025-08-22 RX ORDER — FERROUS SULFATE 325(65) MG
325 TABLET, DELAYED RELEASE (ENTERIC COATED) ORAL
Qty: 90 TABLET | Refills: 0 | Status: SHIPPED | OUTPATIENT
Start: 2025-08-22

## 2025-08-22 RX ORDER — TRAZODONE HYDROCHLORIDE 100 MG/1
TABLET ORAL
COMMUNITY
Start: 2025-08-15

## 2025-08-22 RX ORDER — B-COMPLEX WITH VITAMIN C
50 TABLET ORAL 2 TIMES DAILY WITH MEALS
Qty: 60 TABLET | Refills: 1 | Status: SHIPPED | OUTPATIENT
Start: 2025-08-22 | End: 2025-08-22

## 2025-08-22 RX ORDER — KETOROLAC TROMETHAMINE 10 MG/1
10 TABLET, FILM COATED ORAL EVERY 6 HOURS PRN
Qty: 20 TABLET | Refills: 0 | Status: SHIPPED | OUTPATIENT
Start: 2025-08-22 | End: 2025-10-21

## 2025-08-22 RX ORDER — DIPHENHYDRAMINE HCL 25 MG
25 TABLET ORAL EVERY 8 HOURS PRN
Qty: 60 TABLET | Refills: 0 | Status: SHIPPED | OUTPATIENT
Start: 2025-08-22 | End: 2025-09-21

## 2025-08-25 ASSESSMENT — ENCOUNTER SYMPTOMS
WHEEZING: 0
SORE THROAT: 0
VOMITING: 0
DIARRHEA: 0
RHINORRHEA: 0
EYE REDNESS: 0
SHORTNESS OF BREATH: 0
NAUSEA: 0
COUGH: 0
TROUBLE SWALLOWING: 0

## 2025-08-27 ENCOUNTER — INITIAL CONSULT (OUTPATIENT)
Age: 35
End: 2025-08-27
Payer: MEDICAID

## 2025-08-27 VITALS
TEMPERATURE: 99 F | SYSTOLIC BLOOD PRESSURE: 110 MMHG | BODY MASS INDEX: 31.53 KG/M2 | HEART RATE: 98 BPM | WEIGHT: 167 LBS | DIASTOLIC BLOOD PRESSURE: 70 MMHG | OXYGEN SATURATION: 99 % | HEIGHT: 61 IN

## 2025-08-27 DIAGNOSIS — F43.10 PTSD (POST-TRAUMATIC STRESS DISORDER): Primary | ICD-10-CM

## 2025-08-27 PROCEDURE — G8417 CALC BMI ABV UP PARAM F/U: HCPCS | Performed by: STUDENT IN AN ORGANIZED HEALTH CARE EDUCATION/TRAINING PROGRAM

## 2025-08-27 PROCEDURE — 99204 OFFICE O/P NEW MOD 45 MIN: CPT | Performed by: STUDENT IN AN ORGANIZED HEALTH CARE EDUCATION/TRAINING PROGRAM

## 2025-08-27 PROCEDURE — 1036F TOBACCO NON-USER: CPT | Performed by: STUDENT IN AN ORGANIZED HEALTH CARE EDUCATION/TRAINING PROGRAM

## 2025-08-27 PROCEDURE — G8427 DOCREV CUR MEDS BY ELIG CLIN: HCPCS | Performed by: STUDENT IN AN ORGANIZED HEALTH CARE EDUCATION/TRAINING PROGRAM

## 2025-09-03 ENCOUNTER — OFFICE VISIT (OUTPATIENT)
Age: 35
End: 2025-09-03
Payer: MEDICAID

## 2025-09-03 VITALS
SYSTOLIC BLOOD PRESSURE: 120 MMHG | DIASTOLIC BLOOD PRESSURE: 80 MMHG | BODY MASS INDEX: 31.53 KG/M2 | HEART RATE: 50 BPM | WEIGHT: 167 LBS | OXYGEN SATURATION: 100 % | HEIGHT: 61 IN | TEMPERATURE: 97.1 F

## 2025-09-03 DIAGNOSIS — G43.809 OTHER MIGRAINE WITHOUT STATUS MIGRAINOSUS, NOT INTRACTABLE: ICD-10-CM

## 2025-09-03 DIAGNOSIS — M47.817 LUMBOSACRAL SPONDYLOSIS WITHOUT MYELOPATHY: Primary | ICD-10-CM

## 2025-09-03 PROCEDURE — 99212 OFFICE O/P EST SF 10 MIN: CPT | Performed by: PHYSICAL MEDICINE & REHABILITATION

## 2025-09-03 PROCEDURE — 99213 OFFICE O/P EST LOW 20 MIN: CPT | Performed by: PHYSICAL MEDICINE & REHABILITATION

## 2025-09-03 PROCEDURE — G8427 DOCREV CUR MEDS BY ELIG CLIN: HCPCS | Performed by: PHYSICAL MEDICINE & REHABILITATION

## 2025-09-03 PROCEDURE — 1036F TOBACCO NON-USER: CPT | Performed by: PHYSICAL MEDICINE & REHABILITATION

## 2025-09-03 PROCEDURE — G8417 CALC BMI ABV UP PARAM F/U: HCPCS | Performed by: PHYSICAL MEDICINE & REHABILITATION

## 2025-09-03 ASSESSMENT — ENCOUNTER SYMPTOMS
CONSTIPATION: 0
NAUSEA: 0
SHORTNESS OF BREATH: 0
BACK PAIN: 1

## 2025-09-05 ENCOUNTER — OFFICE VISIT (OUTPATIENT)
Age: 35
End: 2025-09-05
Payer: MEDICAID

## 2025-09-05 DIAGNOSIS — M51.27 HERNIATION OF INTERVERTEBRAL DISC BETWEEN L5 AND S1: Primary | ICD-10-CM

## 2025-09-05 DIAGNOSIS — M54.2 NECK PAIN: ICD-10-CM

## 2025-09-05 DIAGNOSIS — M79.641 BILATERAL HAND PAIN: ICD-10-CM

## 2025-09-05 DIAGNOSIS — M79.642 BILATERAL HAND PAIN: ICD-10-CM

## 2025-09-05 PROCEDURE — G8417 CALC BMI ABV UP PARAM F/U: HCPCS | Performed by: ORTHOPAEDIC SURGERY

## 2025-09-05 PROCEDURE — G8428 CUR MEDS NOT DOCUMENT: HCPCS | Performed by: ORTHOPAEDIC SURGERY

## 2025-09-05 PROCEDURE — 99215 OFFICE O/P EST HI 40 MIN: CPT | Performed by: ORTHOPAEDIC SURGERY

## 2025-09-05 PROCEDURE — 1036F TOBACCO NON-USER: CPT | Performed by: ORTHOPAEDIC SURGERY

## 2025-09-05 PROCEDURE — 99214 OFFICE O/P EST MOD 30 MIN: CPT | Performed by: ORTHOPAEDIC SURGERY

## (undated) DEVICE — SYRINGE MED 10ML LUERLOCK TIP W/O SFTY DISP

## (undated) DEVICE — CUSHION PRONEVIEW L HD NK FOAM

## (undated) DEVICE — NEEDLE HYPO 25GA L1.5IN BLU POLYPR HUB S STL REG BVL STR

## (undated) DEVICE — CONTAINER,SPECIMEN,OR STERILE,4OZ: Brand: MEDLINE

## (undated) DEVICE — SYRINGE MED 30ML STD CLR PLAS LUERLOCK TIP N CTRL DISP

## (undated) DEVICE — SPONGE GZ W4XL4IN RAYON POLY CVR W/NONWOVEN FAB STRL 2/PK

## (undated) DEVICE — LABEL MED MINI W/ MARKER

## (undated) DEVICE — GLOVE ORANGE PI 8 1/2   MSG9085

## (undated) DEVICE — APPLICATOR MEDICATED 10.5 CC SOLUTION HI LT ORNG CHLORAPREP

## (undated) DEVICE — COUNTER NDL 40 COUNT HLD 70 FOAM BLK ADH W/ MAG

## (undated) DEVICE — HYPODERMIC SAFETY NEEDLE: Brand: MAGELLAN

## (undated) DEVICE — SHEET,DRAPE,53X77,STERILE: Brand: MEDLINE

## (undated) DEVICE — NEEDLE EPI L3.5IN DIA17GA TUOHY WNG CLR HUB PERIFIX